# Patient Record
Sex: FEMALE | Race: WHITE | Employment: UNEMPLOYED | URBAN - NONMETROPOLITAN AREA
[De-identification: names, ages, dates, MRNs, and addresses within clinical notes are randomized per-mention and may not be internally consistent; named-entity substitution may affect disease eponyms.]

---

## 2019-10-24 ENCOUNTER — TELEPHONE (OUTPATIENT)
Dept: FAMILY MEDICINE CLINIC | Age: 59
End: 2019-10-24

## 2019-10-24 DIAGNOSIS — D50.9 IRON DEFICIENCY ANEMIA, UNSPECIFIED IRON DEFICIENCY ANEMIA TYPE: ICD-10-CM

## 2019-10-24 DIAGNOSIS — E11.65 TYPE 2 DIABETES MELLITUS WITH HYPERGLYCEMIA, WITHOUT LONG-TERM CURRENT USE OF INSULIN (HCC): ICD-10-CM

## 2019-10-24 DIAGNOSIS — E78.5 HYPERLIPIDEMIA, UNSPECIFIED HYPERLIPIDEMIA TYPE: ICD-10-CM

## 2019-10-24 DIAGNOSIS — I10 ESSENTIAL HYPERTENSION: Primary | ICD-10-CM

## 2019-10-29 LAB
AVERAGE GLUCOSE: 177
AVERAGE GLUCOSE: NORMAL
HBA1C MFR BLD: 7.8 %
HBA1C MFR BLD: NORMAL %

## 2019-11-01 RX ORDER — SENNOSIDES 8.6 MG
TABLET ORAL DAILY
COMMUNITY

## 2019-11-01 RX ORDER — PHENYLEPHRINE HCL 10 MG
TABLET ORAL 2 TIMES DAILY
COMMUNITY

## 2019-11-01 RX ORDER — AMPICILLIN TRIHYDRATE 250 MG
CAPSULE ORAL 2 TIMES DAILY
COMMUNITY
End: 2019-11-04 | Stop reason: ALTCHOICE

## 2019-11-01 RX ORDER — ACETAMINOPHEN 500 MG
500 TABLET ORAL PRN
COMMUNITY
End: 2019-11-04 | Stop reason: ALTCHOICE

## 2019-11-04 ENCOUNTER — OFFICE VISIT (OUTPATIENT)
Dept: FAMILY MEDICINE CLINIC | Age: 59
End: 2019-11-04

## 2019-11-04 VITALS
HEART RATE: 72 BPM | OXYGEN SATURATION: 99 % | BODY MASS INDEX: 25.71 KG/M2 | SYSTOLIC BLOOD PRESSURE: 124 MMHG | TEMPERATURE: 97.7 F | DIASTOLIC BLOOD PRESSURE: 76 MMHG | WEIGHT: 160 LBS | HEIGHT: 66 IN

## 2019-11-04 DIAGNOSIS — E78.5 HYPERLIPIDEMIA, UNSPECIFIED HYPERLIPIDEMIA TYPE: ICD-10-CM

## 2019-11-04 DIAGNOSIS — I10 ESSENTIAL HYPERTENSION: Primary | ICD-10-CM

## 2019-11-04 DIAGNOSIS — E11.65 TYPE 2 DIABETES MELLITUS WITH HYPERGLYCEMIA, WITHOUT LONG-TERM CURRENT USE OF INSULIN (HCC): ICD-10-CM

## 2019-11-04 PROCEDURE — 99213 OFFICE O/P EST LOW 20 MIN: CPT | Performed by: FAMILY MEDICINE

## 2019-11-04 RX ORDER — LISINOPRIL AND HYDROCHLOROTHIAZIDE 20; 12.5 MG/1; MG/1
1 TABLET ORAL 2 TIMES DAILY
COMMUNITY
End: 2019-11-04 | Stop reason: SDUPTHER

## 2019-11-04 RX ORDER — LISINOPRIL AND HYDROCHLOROTHIAZIDE 20; 12.5 MG/1; MG/1
1 TABLET ORAL 2 TIMES DAILY
Qty: 180 TABLET | Refills: 1 | Status: SHIPPED
Start: 2019-11-04 | End: 2020-11-06 | Stop reason: SDUPTHER

## 2019-11-04 RX ORDER — GLYBURIDE 2.5 MG/1
10 TABLET ORAL 2 TIMES DAILY WITH MEALS
Qty: 360 TABLET | Refills: 1 | Status: SHIPPED
Start: 2019-11-04 | End: 2020-02-13 | Stop reason: SDUPTHER

## 2019-11-04 ASSESSMENT — ENCOUNTER SYMPTOMS
EYE PAIN: 0
SORE THROAT: 0
SHORTNESS OF BREATH: 0
COUGH: 0
BACK PAIN: 0
ABDOMINAL PAIN: 0
CONSTIPATION: 0
SINUS PAIN: 0
DIARRHEA: 0

## 2019-11-04 ASSESSMENT — PATIENT HEALTH QUESTIONNAIRE - PHQ9
SUM OF ALL RESPONSES TO PHQ9 QUESTIONS 1 & 2: 0
2. FEELING DOWN, DEPRESSED OR HOPELESS: 0
SUM OF ALL RESPONSES TO PHQ QUESTIONS 1-9: 0
SUM OF ALL RESPONSES TO PHQ QUESTIONS 1-9: 0
1. LITTLE INTEREST OR PLEASURE IN DOING THINGS: 0

## 2019-11-07 DIAGNOSIS — E11.65 TYPE 2 DIABETES MELLITUS WITH HYPERGLYCEMIA, WITHOUT LONG-TERM CURRENT USE OF INSULIN (HCC): ICD-10-CM

## 2019-11-07 DIAGNOSIS — E78.5 HYPERLIPIDEMIA, UNSPECIFIED HYPERLIPIDEMIA TYPE: ICD-10-CM

## 2019-11-07 DIAGNOSIS — D50.9 IRON DEFICIENCY ANEMIA, UNSPECIFIED IRON DEFICIENCY ANEMIA TYPE: ICD-10-CM

## 2019-11-07 DIAGNOSIS — I10 ESSENTIAL HYPERTENSION: ICD-10-CM

## 2020-02-13 RX ORDER — GLYBURIDE 2.5 MG/1
10 TABLET ORAL 2 TIMES DAILY WITH MEALS
Qty: 720 TABLET | Refills: 0 | Status: SHIPPED
Start: 2020-02-13 | End: 2020-02-21 | Stop reason: SDUPTHER

## 2020-02-21 ENCOUNTER — TELEPHONE (OUTPATIENT)
Dept: FAMILY MEDICINE CLINIC | Age: 60
End: 2020-02-21

## 2020-02-21 RX ORDER — GLYBURIDE 5 MG/1
10 TABLET ORAL 2 TIMES DAILY WITH MEALS
Qty: 120 TABLET | Refills: 5 | Status: SHIPPED
Start: 2020-02-21 | End: 2022-01-21 | Stop reason: ALTCHOICE

## 2020-11-06 RX ORDER — LISINOPRIL AND HYDROCHLOROTHIAZIDE 20; 12.5 MG/1; MG/1
1 TABLET ORAL 2 TIMES DAILY
Qty: 180 TABLET | Refills: 0 | Status: SHIPPED
Start: 2020-11-06 | End: 2020-11-13 | Stop reason: SDUPTHER

## 2020-11-06 NOTE — TELEPHONE ENCOUNTER
Name of Medication(s) Requested:  Lisinopril    Pharmacy Requested:   [facility]    Medication(s) pended? [x] Yes  [] No    Last Appointment:  11/4/2019    Future appts:  Future Appointments   Date Time Provider Marcos Cedeno   11/13/2020  3:00 PM DO JIMENEZ [facility]        Does patient need call back? [] Yes  [x] No    She has an appointment soon, but is out.

## 2020-11-13 ENCOUNTER — OFFICE VISIT (OUTPATIENT)
Dept: FAMILY MEDICINE CLINIC | Age: 60
End: 2020-11-13

## 2020-11-13 VITALS
SYSTOLIC BLOOD PRESSURE: 120 MMHG | WEIGHT: 168 LBS | OXYGEN SATURATION: 98 % | BODY MASS INDEX: 27 KG/M2 | HEIGHT: 66 IN | DIASTOLIC BLOOD PRESSURE: 82 MMHG | HEART RATE: 69 BPM | TEMPERATURE: 97.5 F

## 2020-11-13 PROCEDURE — 99214 OFFICE O/P EST MOD 30 MIN: CPT | Performed by: FAMILY MEDICINE

## 2020-11-13 RX ORDER — GLYBURIDE 5 MG/1
10 TABLET ORAL 2 TIMES DAILY WITH MEALS
Qty: 120 TABLET | Refills: 5 | Status: CANCELLED | OUTPATIENT
Start: 2020-11-13

## 2020-11-13 RX ORDER — GLIPIZIDE 10 MG/1
10 TABLET ORAL
Qty: 180 TABLET | Refills: 1 | Status: SHIPPED
Start: 2020-11-13 | End: 2021-11-17 | Stop reason: SDUPTHER

## 2020-11-13 RX ORDER — LISINOPRIL AND HYDROCHLOROTHIAZIDE 20; 12.5 MG/1; MG/1
1 TABLET ORAL 2 TIMES DAILY
Qty: 180 TABLET | Refills: 1 | Status: SHIPPED
Start: 2020-11-13 | End: 2021-11-17 | Stop reason: SDUPTHER

## 2020-11-13 ASSESSMENT — ENCOUNTER SYMPTOMS
ABDOMINAL PAIN: 0
COUGH: 0
WHEEZING: 0
CONSTIPATION: 0
DIARRHEA: 0
BACK PAIN: 0
NAUSEA: 0
SHORTNESS OF BREATH: 0
VOMITING: 0

## 2020-11-13 ASSESSMENT — PATIENT HEALTH QUESTIONNAIRE - PHQ9
1. LITTLE INTEREST OR PLEASURE IN DOING THINGS: 0
2. FEELING DOWN, DEPRESSED OR HOPELESS: 0
SUM OF ALL RESPONSES TO PHQ QUESTIONS 1-9: 0
SUM OF ALL RESPONSES TO PHQ9 QUESTIONS 1 & 2: 0

## 2020-11-13 NOTE — PROGRESS NOTES
20  Rose Hagan : 1960 Sex: female  Age: 56 y.o. Chief Complaint   Patient presents with    Diabetes    Hypertension    Hyperlipidemia     HPI:  56 y.o. female presents today for 1 year follow up of chronic medical conditions, medication refills, and FBW. Patient's chart, medical, surgical and medication history all reviewed. Diabetes Mellitus  56 y.o. female presents for follow up of type 2 diabetes. Current diabetic medications include: oral agent (monotherapy): glyburide (Diabeta). Previous medications tried include: oral agent (monotherapy): metformin (generic), but failed due to diarrhea. Most recent HgA1c was around 7% per patient---7.8% (2019). Her most recent TSH was no checked. LDL is 108. Renal function is normal.  The patient has evidence of end organ damage including cardiovascular disease. She does not see a Podiatrist for foot care . Last eye exam was unknown. Hypertension   The patient presents today for follow up of HTN. The problem is well controlled. Risk factors for coronary artery disease include Age > 27, HTN, diabetes and elevated cholesterol. Current treatments include hydrochlorothiazide (HCTZ), lisinopril (Prinivil) and metoprolol (Lopressor, Toprol). The patient is compliant all of the time. Lifestyle changes the patient has made include none. Today the patient is complaining of none. ROS:  Review of Systems   Constitutional: Negative for chills, fatigue and fever. Respiratory: Negative for cough, shortness of breath and wheezing. Cardiovascular: Negative for chest pain and palpitations. Gastrointestinal: Negative for abdominal pain, constipation, diarrhea, nausea and vomiting. Musculoskeletal: Negative for arthralgias and back pain. Skin: Negative for rash. Neurological: Negative for dizziness and headaches. Psychiatric/Behavioral: Negative for dysphoric mood. The patient is not nervous/anxious.     All other systems reviewed and are negative. Current Outpatient Medications on File Prior to Visit   Medication Sig Dispense Refill    glyBURIDE (DIABETA) 5 MG tablet Take 2 tablets by mouth 2 times daily (with meals) 120 tablet 5    Omega-3 Fatty Acids (FISH OIL DOUBLE STRENGTH) 1200 MG CAPS Take by mouth 2 times daily      Coenzyme Q10 (COQ-10) 400 MG CAPS Take by mouth daily      aspirin 81 MG chewable tablet Take 1 tablet by mouth daily 30 tablet 1    Multiple Vitamins-Minerals (THERAPEUTIC MULTIVITAMIN-MINERALS) tablet Take 1 tablet by mouth daily      Calcium-Magnesium 200-50 MG TABS Take 1 tablet by mouth daily       Cholecalciferol (VITAMIN D3) 5000 UNITS TABS Take 1 tablet by mouth daily        No current facility-administered medications on file prior to visit.         Allergies   Allergen Reactions    Metformin        Past Medical History:   Diagnosis Date    Diabetes mellitus ([facility] [address]     Hyperlipidemia     Hypertension      Past Surgical History:   Procedure Laterality Date     SECTION      3  sections    CORONARY ANGIOPLASTY WITH STENT PLACEMENT  04/10/2017    3.0/38 and 3.0/20 Synergy stents to mid and distal RCA by Dr. Bridgett Navarro       Family History   Problem Relation Age of Onset    Diabetes Maternal Grandfather     Dementia Mother     High Blood Pressure Father      Social History     Socioeconomic History    Marital status:      Spouse name: Not on file    Number of children: Not on file    Years of education: Not on file    Highest education level: Not on file   Occupational History    Not on file   Social Needs    Financial resource strain: Not on file    Food insecurity     Worry: Not on file     Inability: Not on file    Transportation needs     Medical: Not on file     Non-medical: Not on file   Tobacco Use    Smoking status: Never Smoker    Smokeless tobacco: Never Used   Substance and Sexual Activity    Alcohol use: No    Drug use: No    Sexual activity: Not on file   Lifestyle    Physical activity     Days per week: Not on file     Minutes per session: Not on file    Stress: Not on file   Relationships    Social connections     Talks on phone: Not on file     Gets together: Not on file     Attends Adventism service: Not on file     Active member of club or organization: Not on file     Attends meetings of clubs or organizations: Not on file     Relationship status: Not on file    Intimate partner violence     Fear of current or ex partner: Not on file     Emotionally abused: Not on file     Physically abused: Not on file     Forced sexual activity: Not on file   Other Topics Concern    Not on file   Social History Narrative    Not on file       Vitals:    11/13/20 1515   BP: 120/82   Pulse: 69   Temp: 97.5 °F (36.4 °C)   TempSrc: Temporal   SpO2: 98%   Weight: 168 lb (76.2 kg)   Height: 5' 6\" (1.676 m)       Physical Exam:  Physical Exam  Vitals signs and nursing note reviewed. Constitutional:       General: She is not in acute distress. Appearance: Normal appearance. She is well-developed and normal weight. She is not ill-appearing. HENT:      Head: Normocephalic and atraumatic. Right Ear: Hearing and external ear normal.      Left Ear: Hearing and external ear normal.      Nose:      Comments: Wearing mask  Eyes:      General: Lids are normal. No scleral icterus. Extraocular Movements: Extraocular movements intact. Conjunctiva/sclera: Conjunctivae normal.   Neck:      Musculoskeletal: Normal range of motion and neck supple. Thyroid: No thyromegaly. Cardiovascular:      Rate and Rhythm: Normal rate and regular rhythm. Heart sounds: Normal heart sounds. No murmur. Pulmonary:      Effort: Pulmonary effort is normal. No respiratory distress. Breath sounds: Normal breath sounds. No wheezing. Musculoskeletal: Normal range of motion. General: No tenderness or deformity.       Right lower leg: No edema. Left lower leg: No edema. Lymphadenopathy:      Cervical: No cervical adenopathy. Skin:     General: Skin is warm and dry. Findings: No rash. Neurological:      General: No focal deficit present. Mental Status: She is alert and oriented to person, place, and time. Gait: Gait normal.   Psychiatric:         Mood and Affect: Mood and affect normal.         Speech: Speech normal.         Behavior: Behavior normal.         Thought Content: Thought content normal.         Labs:  CBC with Differential:    Lab Results   Component Value Date    WBC 8.2 04/11/2017    RBC 4.93 04/11/2017    HGB 13.5 04/11/2017    HCT 39.9 04/11/2017     04/11/2017    MCV 80.9 04/11/2017    MCH 27.4 04/11/2017    MCHC 33.8 04/11/2017    RDW 13.8 04/11/2017     CMP:    Lab Results   Component Value Date     04/11/2017    K 4.2 04/11/2017    CL 97 04/11/2017    CO2 23 04/11/2017    BUN 10 04/11/2017    CREATININE 0.5 04/11/2017    GFRAA >60 04/11/2017    LABGLOM >60 04/11/2017    GLUCOSE 172 04/11/2017    GLUCOSE 186 11/08/2010    PROT 7.7 11/08/2010    LABALBU 4.8 11/08/2010    CALCIUM 9.6 04/11/2017    BILITOT 0.5 11/08/2010    ALKPHOS 78 11/08/2010    AST 76 11/08/2010    ALT 96 11/08/2010     HgBA1c:    Lab Results   Component Value Date    LABA1C 7.8 10/29/2019     Microalbumen/Creatinine ratio:  No components found for: RUCREAT  FLP:    Lab Results   Component Value Date    TRIG 572 11/08/2010    HDL 30.7 11/08/2010    [address] -  11/08/2010     TSH:  No results found for: TSH     Assessment and Plan:  Gena Crane was seen today for diabetes, hypertension and hyperlipidemia. Diagnoses and all orders for this visit:    Type 2 diabetes mellitus with other circulatory complication, without long-term current use of insulin (HCC)  -     glipiZIDE (GLUCOTROL) 10 MG tablet;  Take 1 tablet by mouth 2 times daily (before meals)  -     CBC Auto Differential; Future  -     Comprehensive Metabolic Panel; Future  -     Hemoglobin A1C; Future  -     Lipid Panel; Future  -     TSH without Reflex; Future  -     Microalbumin / Creatinine Urine Ratio; Future  Overdue for repeat labs at this time. She gets done in outside lab. WIll call with results. Will switch to Glipizide due to cost.     Benign essential hypertension  -     lisinopril-hydroCHLOROthiazide (PRINZIDE;ZESTORETIC) 20-12.5 MG per tablet; Take 1 tablet by mouth 2 times daily  -     metoprolol tartrate (LOPRESSOR) 25 MG tablet; Take 1 tablet by mouth 2 times daily  Well controlled. Continue same medications due to history of stent. Return in about 6 months (around 5/13/2021) for Recheck.       Seen By:  Shruthi Coats DO

## 2021-11-10 ENCOUNTER — OFFICE VISIT (OUTPATIENT)
Dept: FAMILY MEDICINE CLINIC | Age: 61
End: 2021-11-10
Payer: OTHER GOVERNMENT

## 2021-11-10 VITALS
SYSTOLIC BLOOD PRESSURE: 122 MMHG | TEMPERATURE: 96.8 F | DIASTOLIC BLOOD PRESSURE: 84 MMHG | RESPIRATION RATE: 18 BRPM | WEIGHT: 155 LBS | BODY MASS INDEX: 24.91 KG/M2 | HEART RATE: 85 BPM | OXYGEN SATURATION: 98 % | HEIGHT: 66 IN

## 2021-11-10 DIAGNOSIS — U07.1 2019 NOVEL CORONAVIRUS DISEASE (COVID-19): Primary | ICD-10-CM

## 2021-11-10 DIAGNOSIS — R43.2 LOSS OF TASTE: ICD-10-CM

## 2021-11-10 DIAGNOSIS — R09.81 NASAL CONGESTION: ICD-10-CM

## 2021-11-10 LAB
INFLUENZA A ANTIBODY: NORMAL
INFLUENZA B ANTIBODY: NORMAL
Lab: ABNORMAL
PERFORMING INSTRUMENT: ABNORMAL
QC PASS/FAIL: ABNORMAL
SARS-COV-2, POC: DETECTED

## 2021-11-10 PROCEDURE — 99213 OFFICE O/P EST LOW 20 MIN: CPT | Performed by: FAMILY MEDICINE

## 2021-11-10 PROCEDURE — 87804 INFLUENZA ASSAY W/OPTIC: CPT | Performed by: FAMILY MEDICINE

## 2021-11-10 PROCEDURE — 87426 SARSCOV CORONAVIRUS AG IA: CPT | Performed by: FAMILY MEDICINE

## 2021-11-10 RX ORDER — ALBUTEROL SULFATE 2.5 MG/3ML
2.5 SOLUTION RESPIRATORY (INHALATION) EVERY 6 HOURS PRN
Qty: 120 EACH | Refills: 3 | Status: SHIPPED
Start: 2021-11-10 | End: 2022-01-21 | Stop reason: ALTCHOICE

## 2021-11-10 RX ORDER — GUAIFENESIN AND CODEINE PHOSPHATE 100; 10 MG/5ML; MG/5ML
5 SOLUTION ORAL EVERY 4 HOURS PRN
Qty: 237 ML | Refills: 0 | Status: SHIPPED | OUTPATIENT
Start: 2021-11-10 | End: 2021-11-18

## 2021-11-10 RX ORDER — AZITHROMYCIN 250 MG/1
250 TABLET, FILM COATED ORAL SEE ADMIN INSTRUCTIONS
Qty: 6 TABLET | Refills: 0 | Status: SHIPPED | OUTPATIENT
Start: 2021-11-10 | End: 2021-11-15

## 2021-11-10 ASSESSMENT — ENCOUNTER SYMPTOMS
TROUBLE SWALLOWING: 0
EYES NEGATIVE: 1
GASTROINTESTINAL NEGATIVE: 1
SORE THROAT: 1
RESPIRATORY NEGATIVE: 1

## 2021-11-10 NOTE — PROGRESS NOTES
Honey Arrington (:  1960) is a 59 y.o. female,Established patient, here for evaluation of the following chief complaint(s):  Taste Change (x 3 days ), Other (can't smell x 3 days ), Nasal Congestion (x 3 days ), Dizziness (x 3 days ), and Generalized Body Aches (x 3 days )         ASSESSMENT/PLAN:  2019 novel coronavirus disease (COVID-19)  -     XR CHEST (2 VW); Future  -     albuterol (PROVENTIL) (2.5 MG/3ML) 0.083% nebulizer solution; Take 3 mLs by nebulization every 6 hours as needed for Wheezing, Disp-120 each, R-3Normal  -     guaiFENesin-codeine (CHERATUSSIN AC) 100-10 MG/5ML syrup; Take 5 mLs by mouth every 4 hours as needed for Cough for up to 8 days. , Disp-237 mL, R-0Normal  -     azithromycin (ZITHROMAX) 250 MG tablet; Take 1 tablet by mouth See Admin Instructions for 5 days 500mg on day 1 followed by 250mg on days 2 - 5, Disp-6 tablet, R-0Normal  2. Nasal congestion  -     POCT Influenza A/B  -     COVID-19 Ambulatory; Future  -     albuterol (PROVENTIL) (2.5 MG/3ML) 0.083% nebulizer solution; Take 3 mLs by nebulization every 6 hours as needed for Wheezing, Disp-120 each, R-3Normal  -     guaiFENesin-codeine (CHERATUSSIN AC) 100-10 MG/5ML syrup; Take 5 mLs by mouth every 4 hours as needed for Cough for up to 8 days. , Disp-237 mL, R-0Normal  -     azithromycin (ZITHROMAX) 250 MG tablet; Take 1 tablet by mouth See Admin Instructions for 5 days 500mg on day 1 followed by 250mg on days 2 - 5, Disp-6 tablet, R-0Normal  3. Loss of taste  -     POCT COVID-19, Antigen  -     COVID-19 Ambulatory; Future  -     albuterol (PROVENTIL) (2.5 MG/3ML) 0.083% nebulizer solution; Take 3 mLs by nebulization every 6 hours as needed for Wheezing, Disp-120 each, R-3Normal  -     guaiFENesin-codeine (CHERATUSSIN AC) 100-10 MG/5ML syrup; Take 5 mLs by mouth every 4 hours as needed for Cough for up to 8 days. , Disp-237 mL, R-0Normal  -     azithromycin (ZITHROMAX) 250 MG tablet;  Take 1 tablet by mouth See Admin Instructions for 5 days 500mg on day 1 followed by 250mg on days 2 - 5, Disp-6 tablet, R-0Normal  Covid positive. Orders for Covid antibody infusion sent to hospital today. Red flags discussed with patient these occur she is to go directly to the emergency department. We also have her obtain a pulse ox and maintain above 94%. Vitamin regimen also discussed. Patient voiced understanding. No follow-ups on file. Subjective   SUBJECTIVE/OBJECTIVE:  HPI  Patient presents today for 3 to 4 days of worsening body aches, dizziness, congestion, and loss of taste and smell. Patient denies any known sick contact. Denies any fever or chills. Denies any chest pain or shortness of breath other than starting today with shortness of breath. Denies any nausea vomiting or diarrhea. Review of Systems   Constitutional: Positive for fatigue. Negative for fever. HENT: Positive for congestion, postnasal drip and sore throat. Negative for trouble swallowing. Eyes: Negative. Respiratory: Negative. Cardiovascular: Negative. Gastrointestinal: Negative. Musculoskeletal: Positive for myalgias. Negative for neck pain. Skin: Negative for rash. Neurological: Positive for dizziness. Negative for headaches. Hematological: Negative for adenopathy. All other systems reviewed and are negative. Current Outpatient Medications:     albuterol (PROVENTIL) (2.5 MG/3ML) 0.083% nebulizer solution, Take 3 mLs by nebulization every 6 hours as needed for Wheezing, Disp: 120 each, Rfl: 3    guaiFENesin-codeine (CHERATUSSIN AC) 100-10 MG/5ML syrup, Take 5 mLs by mouth every 4 hours as needed for Cough for up to 8 days. , Disp: 237 mL, Rfl: 0    azithromycin (ZITHROMAX) 250 MG tablet, Take 1 tablet by mouth See Admin Instructions for 5 days 500mg on day 1 followed by 250mg on days 2 - 5, Disp: 6 tablet, Rfl: 0    lisinopril-hydroCHLOROthiazide (PRINZIDE;ZESTORETIC) 20-12.5 MG per tablet, Take 1 tablet by Activity    Alcohol use: No    Drug use: No    Sexual activity: Not on file   Other Topics Concern    Not on file   Social History Narrative    Not on file     Social Determinants of Health     Financial Resource Strain:     Difficulty of Paying Living Expenses: Not on file   Food Insecurity:     Worried About Running Out of Food in the Last Year: Not on file    Franky of Food in the Last Year: Not on file   Transportation Needs:     Lack of Transportation (Medical): Not on file    Lack of Transportation (Non-Medical): Not on file   Physical Activity:     Days of Exercise per Week: Not on file    Minutes of Exercise per Session: Not on file   Stress:     Feeling of Stress : Not on file   Social Connections:     Frequency of Communication with Friends and Family: Not on file    Frequency of Social Gatherings with Friends and Family: Not on file    Attends [employer] Services: Not on file    Active Member of [address] [place] [address] [employer] or Organizations: Not on file    Attends Club or Organization Meetings: Not on file    Marital Status: Not on file   Intimate Partner Violence:     Fear of Current or Ex-Partner: Not on file    Emotionally Abused: Not on file    Physically Abused: Not on file    Sexually Abused: Not on file   Housing Stability:     Unable to Pay for Housing in the Last Year: Not on file    Number of [place] in the Last Year: Not on file    Unstable Housing in the Last Year: Not on file     Family History   Problem Relation Age of Onset    Diabetes Maternal Grandfather     Dementia Mother     High Blood Pressure Father       Health Maintenance Due   Topic Date Due    Colon cancer screen colonoscopy  Never done     There are no preventive care reminders to display for this patient.    Diabetes Management   Topic Date Due    Diabetic microalbuminuria test  Never done    A1C test (Diabetic or Prediabetic)  10/29/2020    Lipid screen  10/29/2020      There are no preventive care reminders to display for this patient. Health Maintenance   Topic Date Due    COVID-19 Vaccine (1) Never done    Diabetic microalbuminuria test  Never done    Cervical cancer screen  Never done    Colon cancer screen colonoscopy  Never done    Breast cancer screen  Never done    A1C test (Diabetic or Prediabetic)  10/29/2020    Lipid screen  10/29/2020    Potassium monitoring  10/29/2020    Creatinine monitoring  10/29/2020    Flu vaccine (1) Never done    Diabetic foot exam  11/13/2021 (Originally 1/4/1968)    Diabetic retinal exam  11/13/2021 (Originally 1/4/1968)    DTaP/Tdap/Td vaccine (1 - Tdap) 11/13/2021 (Originally 1/4/1977)    Shingles Vaccine (1 of 2) 11/13/2021 (Originally 1/4/2008)    Pneumococcal 0-64 years Vaccine (1 of 2 - PPSV23) 11/13/2023 (Originally 1/4/1964)    Hepatitis A vaccine  Aged Out    Hib vaccine  Aged Out    Meningococcal (ACWY) vaccine  Aged Out    Hepatitis C screen  Discontinued    HIV screen  Discontinued      There are no preventive care reminders to display for this patient. There are no preventive care reminders to display for this patient. /84   Pulse 85   Temp 96.8 °F (36 °C) (Temporal)   Resp 18   Ht 5' 6\" (1.676 m)   Wt 180 lb (81.6 kg)   SpO2 98%   BMI 29.05 kg/m²     Objective   Physical Exam  Vitals reviewed. Constitutional:       Appearance: She is well-developed. She is ill-appearing. HENT:      Head: Normocephalic and atraumatic. Right Ear: Hearing and tympanic membrane normal.      Left Ear: Hearing and tympanic membrane normal.      Nose: Nose normal.      Mouth/Throat:      Dentition: Normal dentition. Pharynx: No oropharyngeal exudate or posterior oropharyngeal erythema. Tonsils: No tonsillar exudate. Eyes:      Conjunctiva/sclera: Conjunctivae normal.      Pupils: Pupils are equal, round, and reactive to light. Cardiovascular:      Rate and Rhythm: Normal rate and regular rhythm.       Heart sounds: Normal heart sounds. No murmur heard. Pulmonary:      Effort: Pulmonary effort is normal. No respiratory distress. Breath sounds: Decreased air movement present. Decreased breath sounds present. No wheezing. Abdominal:      General: Bowel sounds are normal. There is no distension. Palpations: Abdomen is soft. Musculoskeletal:      Cervical back: Normal range of motion and neck supple. Lymphadenopathy:      Cervical: No cervical adenopathy. Skin:     General: Skin is warm and dry. Findings: No rash. Neurological:      Mental Status: She is alert. Psychiatric:         Behavior: Behavior normal.              Current Outpatient Medications:     albuterol (PROVENTIL) (2.5 MG/3ML) 0.083% nebulizer solution, Take 3 mLs by nebulization every 6 hours as needed for Wheezing, Disp: 120 each, Rfl: 3    guaiFENesin-codeine (CHERATUSSIN AC) 100-10 MG/5ML syrup, Take 5 mLs by mouth every 4 hours as needed for Cough for up to 8 days. , Disp: 237 mL, Rfl: 0    azithromycin (ZITHROMAX) 250 MG tablet, Take 1 tablet by mouth See Admin Instructions for 5 days 500mg on day 1 followed by 250mg on days 2 - 5, Disp: 6 tablet, Rfl: 0    lisinopril-hydroCHLOROthiazide (PRINZIDE;ZESTORETIC) 20-12.5 MG per tablet, Take 1 tablet by mouth 2 times daily, Disp: 180 tablet, Rfl: 1    metoprolol tartrate (LOPRESSOR) 25 MG tablet, Take 1 tablet by mouth 2 times daily, Disp: 180 tablet, Rfl: 1    Omega-3 Fatty Acids (FISH OIL DOUBLE STRENGTH) 1200 MG CAPS, Take by mouth 2 times daily, Disp: , Rfl:     Coenzyme Q10 (COQ-10) 400 MG CAPS, Take by mouth daily, Disp: , Rfl:     aspirin 81 MG chewable tablet, Take 1 tablet by mouth daily, Disp: 30 tablet, Rfl: 1    Calcium-Magnesium 200-50 MG TABS, Take 1 tablet by mouth daily , Disp: , Rfl:     Cholecalciferol (VITAMIN D3) 5000 UNITS TABS, Take 1 tablet by mouth daily , Disp: , Rfl:     glipiZIDE (GLUCOTROL) 10 MG tablet, Take 1 tablet by mouth 2 times daily (before meals), Disp: 180 tablet, Rfl: 1    glyBURIDE (DIABETA) 5 MG tablet, Take 2 tablets by mouth 2 times daily (with meals) (Patient not taking: Reported on 11/10/2021), Disp: 120 tablet, Rfl: 5    Multiple Vitamins-Minerals (THERAPEUTIC MULTIVITAMIN-MINERALS) tablet, Take 1 tablet by mouth daily (Patient not taking: Reported on 11/10/2021), Disp: , Rfl:    Patient Active Problem List   Diagnosis    Benign essential hypertension    Hyperlipidemia    Type 2 diabetes mellitus ([facility]    2019 novel coronavirus disease (COVID-19)     Past Medical History:   Diagnosis Date    Diabetes mellitus ([facility]     Hyperlipidemia     Hypertension      Past Surgical History:   Procedure Laterality Date     SECTION      3  sections    CORONARY ANGIOPLASTY WITH STENT PLACEMENT  04/10/2017    3.0/38 and 3.0/20 Synergy stents to mid and distal RCA by Dr. Matias Lab History     Socioeconomic History    Marital status:      Spouse name: Not on file    Number of children: Not on file    Years of education: Not on file    Highest education level: Not on file   Occupational History    Not on file   Tobacco Use    Smoking status: Never Smoker    Smokeless tobacco: Never Used   Substance and Sexual Activity    Alcohol use: No    Drug use: No    Sexual activity: Not on file   Other Topics Concern    Not on file   Social History Narrative    Not on file     Social Determinants of Health     Financial Resource Strain:     Difficulty of Paying Living Expenses: Not on file   Food Insecurity:     Worried About Running Out of Food in the Last Year: Not on file    Franky of Food in the Last Year: Not on file   Transportation Needs:     Lack of Transportation (Medical): Not on file    Lack of Transportation (Non-Medical):  Not on file   Physical Activity:     Days of Exercise per Week: Not on file    Minutes of Exercise per Session: Not on file   Stress:     Feeling of Stress : Not on file   Social Connections:     Frequency of Communication with Friends and Family: Not on file    Frequency of Social Gatherings with Friends and Family: Not on file    Attends Hinduism Services: Not on file    Active Member of [address] [place] [address] [employer] or Organizations: Not on file    Attends Club or Organization Meetings: Not on file    Marital Status: Not on file   Intimate Partner Violence:     Fear of Current or Ex-Partner: Not on file    Emotionally Abused: Not on file    Physically Abused: Not on file    Sexually Abused: Not on file   Housing Stability:     Unable to Pay for Housing in the Last Year: Not on file    Number of [place] in the Last Year: Not on file    Unstable Housing in the Last Year: Not on file     Family History   Problem Relation Age of Onset    Diabetes Maternal Grandfather     Dementia Mother     High Blood Pressure Father       Health Maintenance Due   Topic Date Due    Colon cancer screen colonoscopy  Never done     There are no preventive care reminders to display for this patient. Diabetes Management   Topic Date Due    Diabetic microalbuminuria test  Never done    A1C test (Diabetic or Prediabetic)  10/29/2020    Lipid screen  10/29/2020      There are no preventive care reminders to display for this patient.    Health Maintenance   Topic Date Due    COVID-19 Vaccine (1) Never done    Diabetic microalbuminuria test  Never done    Cervical cancer screen  Never done    Colon cancer screen colonoscopy  Never done    Breast cancer screen  Never done    A1C test (Diabetic or Prediabetic)  10/29/2020    Lipid screen  10/29/2020    Potassium monitoring  10/29/2020    Creatinine monitoring  10/29/2020    Flu vaccine (1) Never done    Diabetic foot exam  11/13/2021 (Originally 1/4/1968)    Diabetic retinal exam  11/13/2021 (Originally 1/4/1968)    DTaP/Tdap/Td vaccine (1 - Tdap) 11/13/2021 (Originally 1/4/1977)    Shingles Vaccine (1 of 2) 11/13/2021 (Originally 1/4/2008)  Pneumococcal 0-64 years Vaccine (1 of 2 - PPSV23) 11/13/2023 (Originally 1/4/1964)    Hepatitis A vaccine  Aged Out    Hib vaccine  Aged Out    Meningococcal (ACWY) vaccine  Aged Out    Hepatitis C screen  Discontinued    HIV screen  Discontinued      There are no preventive care reminders to display for this patient. There are no preventive care reminders to display for this patient. /84   Pulse 85   Temp 96.8 °F (36 °C) (Temporal)   Resp 18   Ht 5' 6\" (1.676 m)   Wt 180 lb (81.6 kg)   SpO2 98%   BMI 29.05 kg/m²         An electronic signature was used to authenticate this note.     --Alden Santos, DO

## 2021-11-11 DIAGNOSIS — R43.2 LOSS OF TASTE: ICD-10-CM

## 2021-11-11 DIAGNOSIS — R09.81 NASAL CONGESTION: ICD-10-CM

## 2021-11-11 RX ORDER — EPINEPHRINE 1 MG/ML
0.3 INJECTION, SOLUTION, CONCENTRATE INTRAVENOUS PRN
OUTPATIENT
Start: 2021-11-11

## 2021-11-11 RX ORDER — METHYLPREDNISOLONE SODIUM SUCCINATE 125 MG/2ML
125 INJECTION, POWDER, LYOPHILIZED, FOR SOLUTION INTRAMUSCULAR; INTRAVENOUS ONCE
OUTPATIENT
Start: 2021-11-11 | End: 2021-11-11

## 2021-11-11 RX ORDER — SODIUM CHLORIDE 0.9 % (FLUSH) 0.9 %
5-40 SYRINGE (ML) INJECTION PRN
OUTPATIENT
Start: 2021-11-11

## 2021-11-11 RX ORDER — DIPHENHYDRAMINE HYDROCHLORIDE 50 MG/ML
50 INJECTION INTRAMUSCULAR; INTRAVENOUS ONCE
OUTPATIENT
Start: 2021-11-11 | End: 2021-11-11

## 2021-11-11 RX ORDER — SODIUM CHLORIDE 9 MG/ML
INJECTION, SOLUTION INTRAVENOUS CONTINUOUS
OUTPATIENT
Start: 2021-11-11

## 2021-11-12 LAB
SARS-COV-2: DETECTED
SOURCE: ABNORMAL

## 2021-11-17 DIAGNOSIS — I10 BENIGN ESSENTIAL HYPERTENSION: ICD-10-CM

## 2021-11-17 DIAGNOSIS — E11.59 TYPE 2 DIABETES MELLITUS WITH OTHER CIRCULATORY COMPLICATION, WITHOUT LONG-TERM CURRENT USE OF INSULIN (HCC): ICD-10-CM

## 2021-11-17 RX ORDER — GLIPIZIDE 10 MG/1
10 TABLET ORAL
Qty: 180 TABLET | Refills: 0 | Status: CANCELLED | OUTPATIENT
Start: 2021-11-17 | End: 2022-02-15

## 2021-11-17 RX ORDER — LISINOPRIL AND HYDROCHLOROTHIAZIDE 20; 12.5 MG/1; MG/1
1 TABLET ORAL 2 TIMES DAILY
Qty: 180 TABLET | Refills: 0 | Status: SHIPPED
Start: 2021-11-17 | End: 2022-01-21 | Stop reason: SDUPTHER

## 2021-11-17 RX ORDER — GLIPIZIDE 10 MG/1
10 TABLET ORAL
Qty: 180 TABLET | Refills: 0 | Status: SHIPPED
Start: 2021-11-17 | End: 2022-01-21 | Stop reason: SDUPTHER

## 2021-11-17 RX ORDER — LISINOPRIL AND HYDROCHLOROTHIAZIDE 20; 12.5 MG/1; MG/1
1 TABLET ORAL 2 TIMES DAILY
Qty: 180 TABLET | Refills: 1 | Status: CANCELLED | OUTPATIENT
Start: 2021-11-17

## 2021-11-17 NOTE — TELEPHONE ENCOUNTER
Bryce Nelson calling for some refills she will before her appointment on Jan 21. I have [facility].       Glipized      Metoprolol      Lisinopril

## 2022-01-21 ENCOUNTER — OFFICE VISIT (OUTPATIENT)
Dept: FAMILY MEDICINE CLINIC | Age: 62
End: 2022-01-21
Payer: OTHER GOVERNMENT

## 2022-01-21 VITALS
HEIGHT: 66 IN | SYSTOLIC BLOOD PRESSURE: 132 MMHG | WEIGHT: 156 LBS | DIASTOLIC BLOOD PRESSURE: 62 MMHG | HEART RATE: 72 BPM | TEMPERATURE: 97.9 F | BODY MASS INDEX: 25.07 KG/M2 | OXYGEN SATURATION: 98 %

## 2022-01-21 DIAGNOSIS — I25.10 CORONARY ARTERY DISEASE INVOLVING NATIVE CORONARY ARTERY OF NATIVE HEART WITHOUT ANGINA PECTORIS: ICD-10-CM

## 2022-01-21 DIAGNOSIS — I10 BENIGN ESSENTIAL HYPERTENSION: ICD-10-CM

## 2022-01-21 DIAGNOSIS — E78.2 MIXED HYPERLIPIDEMIA: ICD-10-CM

## 2022-01-21 DIAGNOSIS — E11.59 TYPE 2 DIABETES MELLITUS WITH OTHER CIRCULATORY COMPLICATION, WITHOUT LONG-TERM CURRENT USE OF INSULIN (HCC): Primary | ICD-10-CM

## 2022-01-21 PROCEDURE — 99214 OFFICE O/P EST MOD 30 MIN: CPT | Performed by: FAMILY MEDICINE

## 2022-01-21 RX ORDER — LISINOPRIL AND HYDROCHLOROTHIAZIDE 20; 12.5 MG/1; MG/1
1 TABLET ORAL 2 TIMES DAILY
Qty: 180 TABLET | Refills: 3 | Status: SHIPPED | OUTPATIENT
Start: 2022-01-21

## 2022-01-21 RX ORDER — NITROGLYCERIN 0.4 MG/1
0.4 TABLET SUBLINGUAL EVERY 5 MIN PRN
Qty: 25 TABLET | Refills: 1 | Status: SHIPPED | OUTPATIENT
Start: 2022-01-21

## 2022-01-21 RX ORDER — NITROGLYCERIN 0.4 MG/1
0.4 TABLET SUBLINGUAL EVERY 5 MIN PRN
COMMUNITY
End: 2022-01-21 | Stop reason: SDUPTHER

## 2022-01-21 RX ORDER — GLIPIZIDE 10 MG/1
10 TABLET ORAL
Qty: 180 TABLET | Refills: 3 | Status: SHIPPED | OUTPATIENT
Start: 2022-01-21 | End: 2022-04-21

## 2022-01-21 ASSESSMENT — PATIENT HEALTH QUESTIONNAIRE - PHQ9
2. FEELING DOWN, DEPRESSED OR HOPELESS: 0
SUM OF ALL RESPONSES TO PHQ9 QUESTIONS 1 & 2: 0
SUM OF ALL RESPONSES TO PHQ QUESTIONS 1-9: 0
1. LITTLE INTEREST OR PLEASURE IN DOING THINGS: 0

## 2022-01-21 ASSESSMENT — ENCOUNTER SYMPTOMS
CONSTIPATION: 0
SHORTNESS OF BREATH: 0
NAUSEA: 0
ABDOMINAL PAIN: 0
DIARRHEA: 0
COUGH: 0
BACK PAIN: 0
WHEEZING: 0
VOMITING: 0

## 2022-01-21 NOTE — PROGRESS NOTES
reviewed and are negative. Current Outpatient Medications on File Prior to Visit   Medication Sig Dispense Refill    Omega-3 Fatty Acids (FISH OIL DOUBLE STRENGTH) 1200 MG CAPS Take by mouth 2 times daily      Coenzyme Q10 (COQ-10) 400 MG CAPS Take by mouth daily      aspirin 81 MG chewable tablet Take 1 tablet by mouth daily 30 tablet 1    Calcium-Magnesium 200-50 MG TABS Take 1 tablet by mouth daily       Cholecalciferol (VITAMIN D3) 5000 UNITS TABS Take 1 tablet by mouth daily        No current facility-administered medications on file prior to visit.        Allergies   Allergen Reactions    Metformin        Past Medical History:   Diagnosis Date    2019 novel coronavirus disease (COVID-19) 11/10/2021    Diabetes mellitus ([facility] [address]     Hyperlipidemia     Hypertension      Past Surgical History:   Procedure Laterality Date     SECTION      3  sections    CORONARY ANGIOPLASTY WITH STENT PLACEMENT  04/10/2017    3.0/38 and 3.0/20 Synergy stents to mid and distal RCA by Dr. Kolton Marrero History   Problem Relation Age of Onset    Diabetes Maternal Grandfather     Dementia Mother     High Blood Pressure Father      Social History     Socioeconomic History    Marital status:      Spouse name: Not on file    Number of children: Not on file    Years of education: Not on file    Highest education level: Not on file   Occupational History    Not on file   Tobacco Use    Smoking status: Never Smoker    Smokeless tobacco: Never Used   Substance and Sexual Activity    Alcohol use: No    Drug use: No    Sexual activity: Not on file   Other Topics Concern    Not on file   Social History Narrative    Not on file     Social Determinants of Health     Financial Resource Strain:     Difficulty of Paying Living Expenses: Not on file   Food Insecurity:     Worried About [address] in the Last Year: Not on file    [address] in the Last Year: Not on file   Transportation Needs:     Lack of Transportation (Medical): Not on file    Lack of Transportation (Non-Medical): Not on file   Physical Activity:     Days of Exercise per Week: Not on file    Minutes of Exercise per Session: Not on file   Stress:     Feeling of Stress : Not on file   Social Connections:     Frequency of Communication with Friends and Family: Not on file    Frequency of Social Gatherings with Friends and Family: Not on file    Attends [employer] Services: Not on file    Active Member of [address] [place] [address] or Organizations: Not on file    Attends Club or Organization Meetings: Not on file    Marital Status: Not on file   Intimate Partner Violence:     Fear of Current or Ex-Partner: Not on file    Emotionally Abused: Not on file    Physically Abused: Not on file    Sexually Abused: Not on file   Housing Stability:     Unable to Pay for Housing in the Last Year: Not on file    Number of [place] in the Last Year: Not on file    Unstable Housing in the Last Year: Not on file       Vitals:    01/21/22 1509   BP: 132/62   Pulse: 72   Temp: 97.9 °F (36.6 °C)   SpO2: 98%   Weight: 156 lb (70.8 kg)   Height: 5' 6\" (1.676 m)       Physical Exam:  Physical Exam  Vitals and nursing note reviewed. Constitutional:       General: She is not in acute distress. Appearance: Normal appearance. She is well-developed and normal weight. She is not ill-appearing. HENT:      Head: Normocephalic and atraumatic. Right Ear: Hearing and external ear normal.      Left Ear: Hearing and external ear normal.      Nose:      Comments: Wearing mask  Eyes:      General: Lids are normal. No scleral icterus. Extraocular Movements: Extraocular movements intact. Conjunctiva/sclera: Conjunctivae normal.   Neck:      Thyroid: No thyromegaly. Vascular: No carotid bruit. Cardiovascular:      Rate and Rhythm: Normal rate and regular rhythm. Heart sounds: Normal heart sounds.  No murmur heard.      Pulmonary:      Effort: Pulmonary effort is normal. No respiratory distress. Breath sounds: Normal breath sounds. No wheezing. Musculoskeletal:         General: No tenderness or deformity. Normal range of motion. Cervical back: Normal range of motion and neck supple. Right lower leg: No edema. Left lower leg: No edema. Lymphadenopathy:      Cervical: No cervical adenopathy. Skin:     General: Skin is warm and dry. Findings: No rash. Neurological:      General: No focal deficit present. Mental Status: She is alert and oriented to person, place, and time. Gait: Gait normal.   Psychiatric:         Mood and Affect: Mood and affect normal.         Speech: Speech normal.         Behavior: Behavior normal.         Thought Content: Thought content normal.         Labs:  CBC with Differential:    Lab Results   Component Value Date    WBC 8.2 04/11/2017    RBC 4.93 04/11/2017    HGB 13.5 04/11/2017    HCT 39.9 04/11/2017     04/11/2017    MCV 80.9 04/11/2017    MCH 27.4 04/11/2017    MCHC 33.8 04/11/2017    RDW 13.8 04/11/2017     CMP:    Lab Results   Component Value Date     04/11/2017    K 4.2 04/11/2017    CL 97 04/11/2017    CO2 23 04/11/2017    BUN 10 04/11/2017    CREATININE 0.5 04/11/2017    GFRAA >60 04/11/2017    LABGLOM >60 04/11/2017    GLUCOSE 172 04/11/2017    GLUCOSE 186 11/08/2010    PROT 7.7 11/08/2010    LABALBU 4.8 11/08/2010    CALCIUM 9.6 04/11/2017    BILITOT 0.5 11/08/2010    ALKPHOS 78 11/08/2010    AST 76 11/08/2010    ALT 96 11/08/2010     HgBA1c:    Lab Results   Component Value Date    LABA1C 7.8 10/29/2019     Microalbumen/Creatinine ratio:  No components found for: RUCREAT  FLP:    Lab Results   Component Value Date    TRIG 572 11/08/2010    HDL 30.7 11/08/2010    [address] -  11/08/2010     TSH:  No results found for: TSH       Assessment and Plan:  Chhaya Kevin was seen today for diabetes.     Diagnoses and all orders for this visit:    Type 2 diabetes mellitus with other circulatory complication, without long-term current use of insulin (HCC)  -     glipiZIDE (GLUCOTROL) 10 MG tablet; Take 1 tablet by mouth 2 times daily (before meals)  -     CBC Auto Differential; Future  -     Comprehensive Metabolic Panel; Future  -     Hemoglobin A1C; Future  -     Lipid Panel; Future  -     TSH without Reflex; Future  -     Microalbumin / Creatinine Urine Ratio; Future  -     Urinalysis; Future  Poorly controlled. Patient noting FBS of 180-200s. Was able to control with diet in the past.  Unable to afford any other medications without insurance. Will check labs from outside lab and call with results. Benign essential hypertension  -     lisinopril-hydroCHLOROthiazide (PRINZIDE;ZESTORETIC) 20-12.5 MG per tablet; Take 1 tablet by mouth 2 times daily  -     metoprolol tartrate (LOPRESSOR) 25 MG tablet; Take 1 tablet by mouth 2 times daily  -     CBC Auto Differential; Future  -     Comprehensive Metabolic Panel; Future  -     Hemoglobin A1C; Future  -     Lipid Panel; Future  -     TSH without Reflex; Future  -     Microalbumin / Creatinine Urine Ratio; Future  -     Urinalysis; Future  Well controlled    Mixed hyperlipidemia  -     Lipid Panel; Future  Has not been checked since 2019. Coronary artery disease involving native coronary artery of native heart without angina pectoris  -     nitroGLYCERIN (NITROSTAT) 0.4 MG SL tablet; Place 1 tablet under the tongue every 5 minutes as needed for Chest pain up to max of 3 total doses. If no relief after 1 dose, call 911. Return in about 1 year (around 1/21/2023), or if symptoms worsen or fail to improve, for AWV.       Seen By:  Zaida López,

## 2022-01-23 PROBLEM — I25.10 CORONARY ARTERY DISEASE INVOLVING NATIVE CORONARY ARTERY OF NATIVE HEART WITHOUT ANGINA PECTORIS: Status: ACTIVE | Noted: 2022-01-23

## 2022-12-06 ENCOUNTER — APPOINTMENT (OUTPATIENT)
Dept: GENERAL RADIOLOGY | Age: 62
DRG: 281 | End: 2022-12-06

## 2022-12-06 ENCOUNTER — HOSPITAL ENCOUNTER (INPATIENT)
Age: 62
LOS: 1 days | Discharge: ANOTHER HOSPITAL | DRG: 281 | End: 2022-12-07
Attending: EMERGENCY MEDICINE | Admitting: STUDENT IN AN ORGANIZED HEALTH CARE EDUCATION/TRAINING PROGRAM

## 2022-12-06 DIAGNOSIS — I21.4 NSTEMI (NON-ST ELEVATED MYOCARDIAL INFARCTION) (HCC): Primary | ICD-10-CM

## 2022-12-06 PROBLEM — I45.10 RBBB (RIGHT BUNDLE BRANCH BLOCK): Status: ACTIVE | Noted: 2022-12-06

## 2022-12-06 PROBLEM — Z95.5 HISTORY OF RIGHT CORONARY ARTERY STENT PLACEMENT: Status: ACTIVE | Noted: 2022-12-06

## 2022-12-06 LAB
ALBUMIN SERPL-MCNC: 3.9 G/DL (ref 3.5–5.2)
ALP BLD-CCNC: 81 U/L (ref 35–104)
ALT SERPL-CCNC: 7 U/L (ref 0–32)
ANION GAP SERPL CALCULATED.3IONS-SCNC: 12 MMOL/L (ref 7–16)
APTT: 27.3 SEC (ref 24.5–35.1)
APTT: 34.2 SEC (ref 24.5–35.1)
AST SERPL-CCNC: 17 U/L (ref 0–31)
BASOPHILS ABSOLUTE: 0.06 E9/L (ref 0–0.2)
BASOPHILS RELATIVE PERCENT: 1.1 % (ref 0–2)
BILIRUB SERPL-MCNC: 0.3 MG/DL (ref 0–1.2)
BUN BLDV-MCNC: 13 MG/DL (ref 6–23)
CALCIUM SERPL-MCNC: 9.8 MG/DL (ref 8.6–10.2)
CHLORIDE BLD-SCNC: 93 MMOL/L (ref 98–107)
CHOLESTEROL, TOTAL: 449 MG/DL (ref 0–199)
CO2: 23 MMOL/L (ref 22–29)
CREAT SERPL-MCNC: 0.5 MG/DL (ref 0.5–1)
EKG ATRIAL RATE: 76 BPM
EKG ATRIAL RATE: 82 BPM
EKG P AXIS: 35 DEGREES
EKG P AXIS: 60 DEGREES
EKG P-R INTERVAL: 168 MS
EKG P-R INTERVAL: 174 MS
EKG Q-T INTERVAL: 384 MS
EKG Q-T INTERVAL: 392 MS
EKG QRS DURATION: 122 MS
EKG QRS DURATION: 128 MS
EKG QTC CALCULATION (BAZETT): 432 MS
EKG QTC CALCULATION (BAZETT): 457 MS
EKG R AXIS: -68 DEGREES
EKG R AXIS: -70 DEGREES
EKG T AXIS: -11 DEGREES
EKG T AXIS: -36 DEGREES
EKG VENTRICULAR RATE: 76 BPM
EKG VENTRICULAR RATE: 82 BPM
EOSINOPHILS ABSOLUTE: 0.13 E9/L (ref 0.05–0.5)
EOSINOPHILS RELATIVE PERCENT: 2.3 % (ref 0–6)
GFR SERPL CREATININE-BSD FRML MDRD: >60 ML/MIN/1.73
GLUCOSE BLD-MCNC: 345 MG/DL (ref 74–99)
HBA1C MFR BLD: 12.3 % (ref 4–5.6)
HCT VFR BLD CALC: 39.2 % (ref 34–48)
HDLC SERPL-MCNC: 21 MG/DL
HEMOGLOBIN: 13.1 G/DL (ref 11.5–15.5)
IMMATURE GRANULOCYTES #: 0.05 E9/L
IMMATURE GRANULOCYTES %: 0.9 % (ref 0–5)
INR BLD: 1
LDL CHOLESTEROL CALCULATED: ABNORMAL MG/DL (ref 0–99)
LYMPHOCYTES ABSOLUTE: 1.7 E9/L (ref 1.5–4)
LYMPHOCYTES RELATIVE PERCENT: 30.4 % (ref 20–42)
MCH RBC QN AUTO: 27.1 PG (ref 26–35)
MCHC RBC AUTO-ENTMCNC: 33.4 % (ref 32–34.5)
MCV RBC AUTO: 81 FL (ref 80–99.9)
METER GLUCOSE: 274 MG/DL (ref 74–99)
METER GLUCOSE: 363 MG/DL (ref 74–99)
MONOCYTES ABSOLUTE: 0.34 E9/L (ref 0.1–0.95)
MONOCYTES RELATIVE PERCENT: 6.1 % (ref 2–12)
NEUTROPHILS ABSOLUTE: 3.31 E9/L (ref 1.8–7.3)
NEUTROPHILS RELATIVE PERCENT: 59.2 % (ref 43–80)
PDW BLD-RTO: 13.4 FL (ref 11.5–15)
PLATELET # BLD: 250 E9/L (ref 130–450)
PMV BLD AUTO: 10.5 FL (ref 7–12)
POTASSIUM SERPL-SCNC: 4.3 MMOL/L (ref 3.5–5)
PRO-BNP: 85 PG/ML (ref 0–125)
PROTHROMBIN TIME: 11 SEC (ref 9.3–12.4)
RBC # BLD: 4.84 E12/L (ref 3.5–5.5)
SODIUM BLD-SCNC: 128 MMOL/L (ref 132–146)
TOTAL PROTEIN: 6.4 G/DL (ref 6.4–8.3)
TRIGL SERPL-MCNC: 2539 MG/DL (ref 0–149)
TROPONIN, HIGH SENSITIVITY: 67 NG/L (ref 0–9)
TROPONIN, HIGH SENSITIVITY: 82 NG/L (ref 0–9)
VLDLC SERPL CALC-MCNC: ABNORMAL MG/DL
WBC # BLD: 5.6 E9/L (ref 4.5–11.5)

## 2022-12-06 PROCEDURE — 6370000000 HC RX 637 (ALT 250 FOR IP): Performed by: EMERGENCY MEDICINE

## 2022-12-06 PROCEDURE — 93010 ELECTROCARDIOGRAM REPORT: CPT | Performed by: INTERNAL MEDICINE

## 2022-12-06 PROCEDURE — 83036 HEMOGLOBIN GLYCOSYLATED A1C: CPT

## 2022-12-06 PROCEDURE — 93005 ELECTROCARDIOGRAM TRACING: CPT

## 2022-12-06 PROCEDURE — 83880 ASSAY OF NATRIURETIC PEPTIDE: CPT

## 2022-12-06 PROCEDURE — 71045 X-RAY EXAM CHEST 1 VIEW: CPT

## 2022-12-06 PROCEDURE — 2060000000 HC ICU INTERMEDIATE R&B

## 2022-12-06 PROCEDURE — 2580000003 HC RX 258: Performed by: STUDENT IN AN ORGANIZED HEALTH CARE EDUCATION/TRAINING PROGRAM

## 2022-12-06 PROCEDURE — 36415 COLL VENOUS BLD VENIPUNCTURE: CPT

## 2022-12-06 PROCEDURE — 82962 GLUCOSE BLOOD TEST: CPT

## 2022-12-06 PROCEDURE — 93005 ELECTROCARDIOGRAM TRACING: CPT | Performed by: EMERGENCY MEDICINE

## 2022-12-06 PROCEDURE — 85025 COMPLETE CBC W/AUTO DIFF WBC: CPT

## 2022-12-06 PROCEDURE — 6360000002 HC RX W HCPCS

## 2022-12-06 PROCEDURE — 99285 EMERGENCY DEPT VISIT HI MDM: CPT

## 2022-12-06 PROCEDURE — 85610 PROTHROMBIN TIME: CPT

## 2022-12-06 PROCEDURE — 6370000000 HC RX 637 (ALT 250 FOR IP)

## 2022-12-06 PROCEDURE — 80061 LIPID PANEL: CPT

## 2022-12-06 PROCEDURE — 85730 THROMBOPLASTIN TIME PARTIAL: CPT

## 2022-12-06 PROCEDURE — 96374 THER/PROPH/DIAG INJ IV PUSH: CPT

## 2022-12-06 PROCEDURE — 84484 ASSAY OF TROPONIN QUANT: CPT

## 2022-12-06 PROCEDURE — 6370000000 HC RX 637 (ALT 250 FOR IP): Performed by: INTERNAL MEDICINE

## 2022-12-06 PROCEDURE — 80053 COMPREHEN METABOLIC PANEL: CPT

## 2022-12-06 RX ORDER — ASPIRIN 81 MG/1
243 TABLET, CHEWABLE ORAL ONCE
Status: COMPLETED | OUTPATIENT
Start: 2022-12-06 | End: 2022-12-06

## 2022-12-06 RX ORDER — ACETAMINOPHEN 650 MG/1
650 SUPPOSITORY RECTAL EVERY 6 HOURS PRN
Status: DISCONTINUED | OUTPATIENT
Start: 2022-12-06 | End: 2022-12-07 | Stop reason: HOSPADM

## 2022-12-06 RX ORDER — ASPIRIN 81 MG/1
81 TABLET, CHEWABLE ORAL 2 TIMES DAILY
Status: ON HOLD | COMMUNITY

## 2022-12-06 RX ORDER — GLIPIZIDE 10 MG/1
5 TABLET ORAL NIGHTLY
Status: ON HOLD | COMMUNITY

## 2022-12-06 RX ORDER — INSULIN LISPRO 100 [IU]/ML
0-4 INJECTION, SOLUTION INTRAVENOUS; SUBCUTANEOUS NIGHTLY
Status: DISCONTINUED | OUTPATIENT
Start: 2022-12-06 | End: 2022-12-07 | Stop reason: HOSPADM

## 2022-12-06 RX ORDER — LISINOPRIL AND HYDROCHLOROTHIAZIDE 20; 12.5 MG/1; MG/1
1 TABLET ORAL EVERY MORNING
Status: ON HOLD | COMMUNITY

## 2022-12-06 RX ORDER — SODIUM CHLORIDE 9 MG/ML
INJECTION, SOLUTION INTRAVENOUS PRN
Status: DISCONTINUED | OUTPATIENT
Start: 2022-12-06 | End: 2022-12-07 | Stop reason: HOSPADM

## 2022-12-06 RX ORDER — ASPIRIN 81 MG/1
81 TABLET ORAL DAILY
Status: DISCONTINUED | OUTPATIENT
Start: 2022-12-07 | End: 2022-12-07 | Stop reason: HOSPADM

## 2022-12-06 RX ORDER — HEPARIN SODIUM 1000 [USP'U]/ML
4000 INJECTION, SOLUTION INTRAVENOUS; SUBCUTANEOUS PRN
Status: DISCONTINUED | OUTPATIENT
Start: 2022-12-06 | End: 2022-12-07 | Stop reason: HOSPADM

## 2022-12-06 RX ORDER — INSULIN LISPRO 100 [IU]/ML
0-8 INJECTION, SOLUTION INTRAVENOUS; SUBCUTANEOUS
Status: DISCONTINUED | OUTPATIENT
Start: 2022-12-06 | End: 2022-12-07 | Stop reason: HOSPADM

## 2022-12-06 RX ORDER — NITROGLYCERIN 0.4 MG/1
0.4 TABLET SUBLINGUAL ONCE
Status: COMPLETED | OUTPATIENT
Start: 2022-12-06 | End: 2022-12-06

## 2022-12-06 RX ORDER — ATORVASTATIN CALCIUM 40 MG/1
80 TABLET, FILM COATED ORAL ONCE
Status: COMPLETED | OUTPATIENT
Start: 2022-12-06 | End: 2022-12-06

## 2022-12-06 RX ORDER — FENTANYL CITRATE 50 UG/ML
25 INJECTION, SOLUTION INTRAMUSCULAR; INTRAVENOUS ONCE
Status: COMPLETED | OUTPATIENT
Start: 2022-12-06 | End: 2022-12-06

## 2022-12-06 RX ORDER — DEXTROSE MONOHYDRATE 100 MG/ML
INJECTION, SOLUTION INTRAVENOUS CONTINUOUS PRN
Status: DISCONTINUED | OUTPATIENT
Start: 2022-12-06 | End: 2022-12-07 | Stop reason: HOSPADM

## 2022-12-06 RX ORDER — GLIPIZIDE 10 MG/1
10 TABLET ORAL EVERY MORNING
Status: ON HOLD | COMMUNITY

## 2022-12-06 RX ORDER — HEPARIN SODIUM 1000 [USP'U]/ML
4000 INJECTION, SOLUTION INTRAVENOUS; SUBCUTANEOUS ONCE
Status: COMPLETED | OUTPATIENT
Start: 2022-12-06 | End: 2022-12-06

## 2022-12-06 RX ORDER — CHLORAL HYDRATE 500 MG
1000 CAPSULE ORAL 2 TIMES DAILY
Status: ON HOLD | COMMUNITY

## 2022-12-06 RX ORDER — SODIUM CHLORIDE 0.9 % (FLUSH) 0.9 %
10 SYRINGE (ML) INJECTION EVERY 12 HOURS SCHEDULED
Status: DISCONTINUED | OUTPATIENT
Start: 2022-12-06 | End: 2022-12-07 | Stop reason: HOSPADM

## 2022-12-06 RX ORDER — ONDANSETRON 2 MG/ML
4 INJECTION INTRAMUSCULAR; INTRAVENOUS EVERY 6 HOURS PRN
Status: DISCONTINUED | OUTPATIENT
Start: 2022-12-06 | End: 2022-12-07 | Stop reason: HOSPADM

## 2022-12-06 RX ORDER — POTASSIUM CHLORIDE 7.45 MG/ML
10 INJECTION INTRAVENOUS PRN
Status: DISCONTINUED | OUTPATIENT
Start: 2022-12-06 | End: 2022-12-07 | Stop reason: HOSPADM

## 2022-12-06 RX ORDER — ACETAMINOPHEN 325 MG/1
650 TABLET ORAL EVERY 6 HOURS PRN
Status: DISCONTINUED | OUTPATIENT
Start: 2022-12-06 | End: 2022-12-07 | Stop reason: HOSPADM

## 2022-12-06 RX ORDER — LISINOPRIL AND HYDROCHLOROTHIAZIDE 20; 12.5 MG/1; MG/1
1 TABLET ORAL NIGHTLY
Status: ON HOLD | COMMUNITY

## 2022-12-06 RX ORDER — SENNA PLUS 8.6 MG/1
1 TABLET ORAL DAILY PRN
Status: DISCONTINUED | OUTPATIENT
Start: 2022-12-06 | End: 2022-12-07 | Stop reason: HOSPADM

## 2022-12-06 RX ORDER — MAGNESIUM OXIDE 400 MG/1
400 TABLET ORAL NIGHTLY
Status: ON HOLD | COMMUNITY

## 2022-12-06 RX ORDER — SODIUM CHLORIDE 0.9 % (FLUSH) 0.9 %
10 SYRINGE (ML) INJECTION PRN
Status: DISCONTINUED | OUTPATIENT
Start: 2022-12-06 | End: 2022-12-07 | Stop reason: HOSPADM

## 2022-12-06 RX ORDER — PHENOL 1.4 %
600 AEROSOL, SPRAY (ML) MUCOUS MEMBRANE NIGHTLY
Status: ON HOLD | COMMUNITY

## 2022-12-06 RX ORDER — ONDANSETRON 4 MG/1
4 TABLET, ORALLY DISINTEGRATING ORAL EVERY 8 HOURS PRN
Status: DISCONTINUED | OUTPATIENT
Start: 2022-12-06 | End: 2022-12-07 | Stop reason: HOSPADM

## 2022-12-06 RX ORDER — HEPARIN SODIUM 1000 [USP'U]/ML
2000 INJECTION, SOLUTION INTRAVENOUS; SUBCUTANEOUS PRN
Status: DISCONTINUED | OUTPATIENT
Start: 2022-12-06 | End: 2022-12-07 | Stop reason: HOSPADM

## 2022-12-06 RX ORDER — HEPARIN SODIUM 10000 [USP'U]/100ML
5-30 INJECTION, SOLUTION INTRAVENOUS CONTINUOUS
Status: DISCONTINUED | OUTPATIENT
Start: 2022-12-06 | End: 2022-12-07 | Stop reason: HOSPADM

## 2022-12-06 RX ORDER — POTASSIUM CHLORIDE 20 MEQ/1
40 TABLET, EXTENDED RELEASE ORAL PRN
Status: DISCONTINUED | OUTPATIENT
Start: 2022-12-06 | End: 2022-12-07 | Stop reason: HOSPADM

## 2022-12-06 RX ADMIN — HEPARIN SODIUM 4000 UNITS: 1000 INJECTION INTRAVENOUS; SUBCUTANEOUS at 20:05

## 2022-12-06 RX ADMIN — ASPIRIN 243 MG: 81 TABLET, CHEWABLE ORAL at 08:10

## 2022-12-06 RX ADMIN — FENTANYL CITRATE 25 MCG: 50 INJECTION INTRAMUSCULAR; INTRAVENOUS at 08:56

## 2022-12-06 RX ADMIN — NITROGLYCERIN 0.4 MG: 0.4 TABLET SUBLINGUAL at 08:10

## 2022-12-06 RX ADMIN — NITROGLYCERIN 0.4 MG: 0.4 TABLET SUBLINGUAL at 08:52

## 2022-12-06 RX ADMIN — METOPROLOL TARTRATE 25 MG: 25 TABLET, FILM COATED ORAL at 19:59

## 2022-12-06 RX ADMIN — HEPARIN SODIUM 4000 UNITS: 1000 INJECTION INTRAVENOUS; SUBCUTANEOUS at 13:51

## 2022-12-06 RX ADMIN — Medication 10 ML: at 20:00

## 2022-12-06 RX ADMIN — NITROGLYCERIN 0.5 INCH: 20 OINTMENT TOPICAL at 14:02

## 2022-12-06 RX ADMIN — HEPARIN SODIUM 12 UNITS/KG/HR: 10000 INJECTION, SOLUTION INTRAVENOUS at 13:51

## 2022-12-06 RX ADMIN — NITROGLYCERIN 0.5 INCH: 20 OINTMENT TOPICAL at 19:59

## 2022-12-06 RX ADMIN — ATORVASTATIN CALCIUM 80 MG: 40 TABLET, FILM COATED ORAL at 14:03

## 2022-12-06 ASSESSMENT — ENCOUNTER SYMPTOMS
SORE THROAT: 0
EYE REDNESS: 0
WHEEZING: 0
ABDOMINAL DISTENTION: 0
DIARRHEA: 0
SHORTNESS OF BREATH: 0
COUGH: 0
ABDOMINAL PAIN: 0
EYE DISCHARGE: 0
CHEST TIGHTNESS: 1
NAUSEA: 0
CONSTIPATION: 0
SINUS PRESSURE: 0
PHOTOPHOBIA: 0
RHINORRHEA: 0
BACK PAIN: 0
VOMITING: 0
BLOOD IN STOOL: 0

## 2022-12-06 ASSESSMENT — PAIN SCALES - GENERAL
PAINLEVEL_OUTOF10: 0
PAINLEVEL_OUTOF10: 8
PAINLEVEL_OUTOF10: 3
PAINLEVEL_OUTOF10: 8
PAINLEVEL_OUTOF10: 0

## 2022-12-06 NOTE — ED PROVIDER NOTES
[facility]  Department of Emergency Medicine     Written by: Libby Rivera MD  Patient Name: Singh Vásquez  Attending Provider: Jayson Dakins, DO  Admit Date: 2022  7:21 AM  MRN: [id number]                   : 1960        Chief Complaint   Patient presents with    Chest Pain     Chest pressure onset 0400, mid sternal non radiating    Dizziness    - Chief complaint    HPI   Patient is a 58-year-old female with a past medical history of CAD status post 2 stents placed in 2017, type 2 diabetes, hyperlipidemia, hypertension presenting with chest pain and dizziness. Patient awoke this morning around 4 AM and while she was in the bathroom, she started experiencing chest pressure. She describes the sensation as a dull, achy, radiating through to the back, and different from her prior presentation. Patient states that her pressure-like sensation is now evolving into pain with the same distribution. Patient took 81 mg of aspirin which did not improve her symptoms. Patient had associated dizziness and shortness of breath. Patient states her prior presentation was \"fluttering\" when she exerted herself. Patient denies anticoagulant use. Patient denies smoking history, alcohol use, or drug use. Patient denies headache, lightheadedness, numbness, weakness, tingling, loss of sensation, loss of consciousness, fever, cough, sore throat, nausea, vomiting, abdominal pain, hematuria, dysuria, increasing or decreasing urinary frequency, blood in stool, constipation, diarrhea, leg pain, leg swelling, recent travel, recent illness, recent surgery, or sick contacts. Review of Systems   Constitutional:  Negative for activity change, chills, fatigue and fever. HENT:  Negative for congestion, postnasal drip, rhinorrhea, sinus pressure and sore throat. Eyes:  Negative for photophobia, discharge, redness and visual disturbance. Respiratory:  Positive for chest tightness.  Negative for cough, shortness of breath and wheezing. Cardiovascular:  Positive for chest pain. Negative for palpitations and leg swelling. Gastrointestinal:  Negative for abdominal distention, abdominal pain, blood in stool, constipation, diarrhea, nausea and vomiting. Endocrine: Negative for cold intolerance and heat intolerance. Genitourinary:  Negative for decreased urine volume, difficulty urinating, dysuria, flank pain, frequency, hematuria and urgency. Musculoskeletal:  Negative for arthralgias, back pain, joint swelling and myalgias. Skin:  Negative for rash and wound. Allergic/Immunologic: Negative for immunocompromised state. Neurological:  Positive for dizziness. Negative for syncope, facial asymmetry, weakness, light-headedness, numbness and headaches. Hematological:  Does not bruise/bleed easily. Psychiatric/Behavioral:  Negative for behavioral problems and hallucinations. Physical Exam  Constitutional:       General: She is not in acute distress. Appearance: Normal appearance. She is not ill-appearing, toxic-appearing or diaphoretic. HENT:      Head: Normocephalic and atraumatic. Right Ear: Hearing normal.      Left Ear: Hearing normal.      Nose: Nose normal.      Mouth/Throat:      Lips: Pink. Mouth: Mucous membranes are moist.      Pharynx: Oropharynx is clear. Eyes:      Extraocular Movements: Extraocular movements intact. Conjunctiva/sclera: Conjunctivae normal.      Pupils: Pupils are equal, round, and reactive to light. Cardiovascular:      Rate and Rhythm: Normal rate and regular rhythm. Pulses: Normal pulses. Radial pulses are 2+ on the right side and 2+ on the left side. Posterior tibial pulses are 2+ on the right side and 2+ on the left side. Heart sounds: S1 normal and S2 normal.   Pulmonary:      Effort: Pulmonary effort is normal. No tachypnea, accessory muscle usage or respiratory distress.       Breath sounds: Normal breath sounds and air entry. No decreased breath sounds, wheezing, rhonchi or rales. Chest:      Chest wall: No mass, lacerations, deformity, swelling, tenderness, crepitus or edema. Abdominal:      General: Abdomen is flat. Bowel sounds are normal. There is no distension. Palpations: Abdomen is soft. There is no fluid wave or mass. Tenderness: There is no abdominal tenderness. There is no right CVA tenderness, left CVA tenderness or guarding. Musculoskeletal:         General: No swelling or tenderness. Normal range of motion. Cervical back: Normal range of motion and neck supple. No rigidity. Right lower leg: No edema. Left lower leg: No edema. Lymphadenopathy:      Cervical: No cervical adenopathy. Skin:     General: Skin is warm and dry. Capillary Refill: Capillary refill takes less than 2 seconds. Findings: No bruising, lesion or rash. Neurological:      General: No focal deficit present. Mental Status: She is alert and oriented to person, place, and time. Mental status is at baseline. Motor: No weakness. Psychiatric:         Attention and Perception: Attention normal.         Mood and Affect: Mood and affect normal.         Behavior: Behavior is cooperative. EKG Interpretation 0709    Interpreted by emergency department physician    Rhythm: normal sinus   Rate: normal  Axis: left  Ectopy: none  Conduction: right bundle branch block (complete) and left anterior fasciclar block  ST Segments: no acute change  T Waves: Inverted or biphasic T waves seen in V3-V6, T wave inversion seen in III, aVF  Q Waves: none    Clinical Impression: Above findings. Cannot compare with old EKG in 2017. EKG Interpretation 0813    Interpreted by emergency department physician    Rhythm: normal sinus   Rate: normal  Axis: left  Ectopy: none  Conduction: right bundle branch block (complete) and left anterior fasciclar block  ST Segments: no acute change  T Waves:  Inverted or biphasic T waves seen in V3-V6, T wave inversion seen in III, aVF  Q Waves: none    Clinical Impression: no acute changes when compared with prior EKG obtained earlier today    Aura Wade MD      Procedures       MDM  Number of Diagnoses or Management Options  NSTEMI (non-ST elevated myocardial infarction) [facility])  Diagnosis management comments: Patient is a 68-year-old female with a past medical history of CAD status post 2 stents placed in 2017, type 2 diabetes, hyperlipidemia, hypertension presenting with chest pain and dizziness. At the time of initial examination the patient is hypertensive but otherwise hemodynamically stable. EKGs as interpreted by me above. Concern for acute coronary syndrome in this patient. Patient was given 234 mg of aspirin and sublingual nitroglycerin for her symptoms. Upon reevaluation, the patient still experiencing some chest pain. Repeat EKG as interpreted by me as above. Patient was given another sublingual nitroglycerin and 25 mcg of fentanyl. Upon reevaluation, patient is experiencing significant relief of her symptoms. Labs as interpreted by me are as follows. CBC, PT/INR, and APTT are all within normal limits. CMP shows evidence of hyponatremia with a sodium of 128. Initial troponin 67. Repeat troponin delta is +15. Chest x-ray shows no acute pathology as interpreted by me. Patient was started on heparin infusion and statin medications. Case was discussed with Dr. Bautista Bradshaw, cardiologist, who agrees with plan to admit at [facility] recommends nitro paste for further symptom control. Patient was reevaluated and is changing relief of her symptoms. Patient was explained the results of her blood work and her imaging as well as the evidence of NSTEMI. Patient demonstrates good understanding and is agreeable for admission. Case was discussed with Dr. Robyn Velarde, hospitalist, who agrees to admit the patient.   At the time admission, the patient demonstrated good understanding of her condition, had no questions, and was hemodynamically stable. ED Course as of 22 1456   Tue Dec 06, 2022   0724 EC  Normal sinus rhythm  H76  Rbbb, lafb, left axis deviation, anterolateral t wave changes, previous inferior mi  Cannot open old ecg to compare [EUGENE]   0820 The patient was reevaluated and states that her pain is from a 5 to a 4 after sublingual nitroglycerin and aspirin. Patient's old EKG was faxed over from the facility showing that patient did not have prior T wave inversion seen in the anterior lateral leads. T wave inversion seen in inferior leads appear to be old [VG]   0915 Pt ree-valuated now chest pain free feels much better [EUGENE]   1038 Case was discussed with Dr. Delfina Montgomery, cardiologist, who like the patient admitted here and started on Nitropaste for her symptoms. Agrees with the read of new T wave inversions in the lateral leads and possible NSTEMI. [VG]   1052 D/w dr Carley Moses will admit, dr pierson resident admitting in ed now [EUGENE]   1052 Critical care:  Please note that the withdrawal or failure to initiate urgent interventions for this patient would likely result in a life threatening deterioration or permanent disability. Accordingly this patient received 33 minutes of critical care time, excluding separately billable procedures. [EUGENE]   1053 ECG: #2 0813  Normal sinus rhythm  Bifasicular block, nonspecific t wave abnormality similar to previous [EUGENE]      ED Course User Index  [EUGENE] Priyank Callahan DO  [VG] Barbara Arriaza MD       --------------------------------------------- PAST HISTORY ---------------------------------------------  Past Medical History:  has a past medical history of 2019 novel coronavirus disease (COVID-19), Coronary artery disease involving native coronary artery of native heart without angina pectoris, Diabetes mellitus ([address] Hyperlipidemia, and Hypertension.     Past Surgical History:  has a past surgical history that includes Tonsillectomy;  section; and Coronary angioplasty with stent (04/10/2017). Social History:  reports that she has never smoked. She has never used smokeless tobacco. She reports that she does not drink alcohol and does not use drugs. Family History: family history includes Dementia in her mother; Diabetes in her maternal grandfather; High Blood Pressure in her father. The patients home medications have been reviewed.     Allergies: Metformin    -------------------------------------------------- RESULTS -------------------------------------------------    LABS:  Results for orders placed or performed during the hospital encounter of 22   CBC with Auto Differential   Result Value Ref Range    WBC 5.6 4.5 - 11.5 E9/L    RBC 4.84 3.50 - 5.50 E12/L    Hemoglobin 13.1 11.5 - 15.5 g/dL    Hematocrit 39.2 34.0 - 48.0 %    MCV 81.0 80.0 - 99.9 fL    MCH 27.1 26.0 - 35.0 pg    MCHC 33.4 32.0 - 34.5 %    RDW 13.4 11.5 - 15.0 fL    Platelets 212 088 - 527 E9/L    MPV 10.5 7.0 - 12.0 fL    Neutrophils % 59.2 43.0 - 80.0 %    Immature Granulocytes % 0.9 0.0 - 5.0 %    Lymphocytes % 30.4 20.0 - 42.0 %    Monocytes % 6.1 2.0 - 12.0 %    Eosinophils % 2.3 0.0 - 6.0 %    Basophils % 1.1 0.0 - 2.0 %    Neutrophils Absolute 3.31 1.80 - 7.30 E9/L    Immature Granulocytes # 0.05 E9/L    Lymphocytes Absolute 1.70 1.50 - 4.00 E9/L    Monocytes Absolute 0.34 0.10 - 0.95 E9/L    Eosinophils Absolute 0.13 0.05 - 0.50 E9/L    Basophils Absolute 0.06 0.00 - 0.20 E9/L   CMP   Result Value Ref Range    Sodium 128 (L) 132 - 146 mmol/L    Potassium 4.3 3.5 - 5.0 mmol/L    Chloride 93 (L) 98 - 107 mmol/L    CO2 23 22 - 29 mmol/L    Anion Gap 12 7 - 16 mmol/L    Glucose 345 (H) 74 - 99 mg/dL    BUN 13 6 - 23 mg/dL    Creatinine 0.5 0.5 - 1.0 mg/dL    Est, Glom Filt Rate >60 >=60 mL/min/1.73    Calcium 9.8 8.6 - 10.2 mg/dL    Total Protein 6.4 6.4 - 8.3 g/dL    Albumin 3.9 3.5 - 5.2 g/dL    Total Bilirubin 0.3 0.0 - 1.2 mg/dL    Alkaline Phosphatase 81 35 - 104 U/L    ALT 7 0 - 32 U/L    AST 17 0 - 31 U/L   Troponin   Result Value Ref Range    Troponin, High Sensitivity 67 (H) 0 - 9 ng/L   Brain Natriuretic Peptide   Result Value Ref Range    Pro-BNP 85 0 - 125 pg/mL   Protime-INR   Result Value Ref Range    Protime 11.0 9.3 - 12.4 sec    INR 1.0    APTT   Result Value Ref Range    aPTT 27.3 24.5 - 35.1 sec   Troponin   Result Value Ref Range    Troponin, High Sensitivity 82 (H) 0 - 9 ng/L   EKG 12 Lead   Result Value Ref Range    Ventricular Rate 76 BPM    Atrial Rate 76 BPM    P-R Interval 168 ms    QRS Duration 128 ms    Q-T Interval 384 ms    QTc Calculation (Bazett) 432 ms    P Axis 60 degrees    R Axis -68 degrees    T Axis -11 degrees   EKG 12 Lead   Result Value Ref Range    Ventricular Rate 82 BPM    Atrial Rate 82 BPM    P-R Interval 174 ms    QRS Duration 122 ms    Q-T Interval 392 ms    QTc Calculation (Bazett) 457 ms    P Axis 35 degrees    R Axis -70 degrees    T Axis -36 degrees       RADIOLOGY:  XR CHEST PORTABLE   Final Result   No acute process. ------------------------- NURSING NOTES AND VITALS REVIEWED ---------------------------  Date / Time Roomed:  12/6/2022  7:21 AM  ED Bed Assignment:  16/16    The nursing notes within the ED encounter and vital signs as below have been reviewed.      Patient Vitals for the past 24 hrs:   BP Temp Temp src Pulse Resp SpO2 Height Weight   12/06/22 1356 121/83 -- -- 88 -- -- -- --   12/06/22 0852 107/69 -- -- 73 -- -- -- --   12/06/22 0810 131/80 -- -- 69 -- -- -- --   12/06/22 0714 (!) 148/94 98.2 °F (36.8 °C) Oral 80 18 99 % 5' 6\" (1.676 m) 155 lb (70.3 kg)       Oxygen Saturation Interpretation: Normal    ------------------------------------------ PROGRESS NOTES ------------------------------------------  Re-evaluation(s):  Time: multiple  Patients symptoms are improving  Repeat physical examination is significantly improved    Counseling:  I have spoken with the patient and discussed todays results, in addition to providing specific details for the plan of care and counseling regarding the diagnosis and prognosis. Their questions are answered at this time and they are agreeable with the plan of admission.    --------------------------------- ADDITIONAL PROVIDER NOTES ---------------------------------  Consultations:  Cardiology: Dr. Ana Rosa Haq, who agrees that the patient likely suffered an NSTEMI and recommends that we add on statins and nitroglycerin paste for symptom control. Internal medicine: Spoke with Dr. Olu Jon. Discussed case. They will admit the patient. This patient's ED course included: a personal history and physicial examination, re-evaluation prior to disposition, multiple bedside re-evaluations, IV medications, cardiac monitoring, continuous pulse oximetry, and complex medical decision making and emergency management    This patient has remained hemodynamically stable during their ED course. Diagnosis:  1. NSTEMI (non-ST elevated myocardial infarction) ([facility] [address]        Disposition:  Patient's disposition: Admit to telemetry  Patient's condition is stable. Patient was seen and evaluated by myself and my attending Lavonne Anglin DO. Assessment and Plan discussed with attending provider, please see attestation for final plan of care.      MD Yanira An MD  Resident  12/06/22 2675

## 2022-12-06 NOTE — CONSULTS
[facility] Cardiology Consult     INPATIENT CARDIOLOGY CONSULT    Name: Margot Ga    Age: 57 y.o. Date of Admission: 2022  7:21 AM    Date of Service: 2022    Reason for Consultation: Chest pain    Chief Complaint: Same    Referring Physician: Dr. Lincoln Cavazos    History of Present Illness: The patient is a 57y.o. year old female who presents to the emergency department after onset of moderate midsternal chest pressure and shortness of breath after getting up early this morning. No associated nausea or vomiting. No diaphoresis. On ED arrival her EKG is reviewed and shows sinus rhythm with chronic right bundle branch block and new lateral T wave changes. High-sensitivity troponin increased to 82 and she has received aspirin, IV heparin and placed on Nitropaste. Currently pain-free and on room air oxygen with stable blood pressure. History of inferior infarction and RCA MIKEL in 2017, chronic right bundle branch block, hyperlipidemia/hypertriglyceridemia with previous refusal to take statins    Chest x-ray, EKGs and telemetry independently reviewed.   EKG as described above  Telemetry: Sinus rhythm      Past Medical History:   Past Medical History:   Diagnosis Date    2019 novel coronavirus disease (COVID-19) 11/10/2021    Coronary artery disease involving native coronary artery of native heart without angina pectoris     Diabetes mellitus ([facility] [address]     Hyperlipidemia     Hypertension        Past Surgical History:  Past Surgical History:   Procedure Laterality Date     SECTION      3  sections    CORONARY ANGIOPLASTY WITH STENT PLACEMENT  04/10/2017    3.0/38 and 3.0/20 Synergy stents to mid and distal RCA by Dr. Maribel Escobedo         Family History:  Family History   Problem Relation Age of Onset    Diabetes Maternal Grandfather     Dementia Mother     High Blood Pressure Father        Social History:  Social History     Socioeconomic History    Marital status:  Spouse name: Not on file    Number of children: Not on file    Years of education: Not on file    Highest education level: Not on file   Occupational History    Not on file   Tobacco Use    Smoking status: Never    Smokeless tobacco: Never   Substance and Sexual Activity    Alcohol use: No    Drug use: No    Sexual activity: Not on file   Other Topics Concern    Not on file   Social History Narrative    Not on file     Social Determinants of Health     Financial Resource Strain: Not on file   Food Insecurity: Not on file   Transportation Needs: Not on file   Physical Activity: Not on file   Stress: Not on file   Social Connections: Not on file   Intimate Partner Violence: Not on file   Housing Stability: Not on file       Allergies: Allergies   Allergen Reactions    Metformin Diarrhea       Home Meds:  Prior to Admission medications    Medication Sig Start Date End Date Taking? Authorizing Provider   aspirin 81 MG chewable tablet Take 81 mg by mouth in the morning and 81 mg in the evening.    Yes Historical Provider, MD   magnesium oxide (MAG-OX) 400 MG tablet Take 400 mg by mouth nightly   Yes Historical Provider, MD   calcium carbonate 600 MG TABS tablet Take 600 mg by mouth nightly   Yes Historical Provider, MD   Omega-3 Fatty Acids (FISH OIL) 1000 MG capsule Take 1,000 mg by mouth 2 times daily   Yes Historical Provider, MD   metoprolol tartrate (LOPRESSOR) 25 MG tablet Take 25 mg by mouth every morning **SEE OTHER ORDER**   Yes Historical Provider, MD   metoprolol tartrate (LOPRESSOR) 25 MG tablet Take 12.5 mg by mouth nightly **SEE OTHER ORDER**   Yes Historical Provider, MD   lisinopril-hydroCHLOROthiazide (PRINZIDE;ZESTORETIC) 20-12.5 MG per tablet Take 1 tablet by mouth every morning **SEE OTHER ORDER**   Yes Historical Provider, MD   lisinopril-hydroCHLOROthiazide (PRINZIDE;ZESTORETIC) 20-12.5 MG per tablet Take 1 tablet by mouth nightly **SEE OTHER ORDER**   Yes Historical Provider, MD   glipiZIDE (GLUCOTROL) 10 MG tablet Take 10 mg by mouth every morning **SEE OTHER ORDER**   Yes Historical Provider, MD   glipiZIDE (GLUCOTROL) 10 MG tablet Take 5 mg by mouth nightly **SEE OTHER ORDER**   Yes Historical Provider, MD   POTASSIUM PO Take 50 mg by mouth every other day   Yes Historical Provider, MD   nitroGLYCERIN (NITROSTAT) 0.4 MG SL tablet Place 1 tablet under the tongue every 5 minutes as needed for Chest pain up to max of 3 total doses.  If no relief after 1 dose, call 911. 1/21/22   Roxanna Herron,    Cholecalciferol (VITAMIN D3) 5000 UNITS TABS Take 5,000 Units by mouth every morning    Historical Provider, MD       Current Medications:  Current Facility-Administered Medications   Medication Dose Route Frequency Provider Last Rate Last Admin    heparin (porcine) injection 4,000 Units  4,000 Units IntraVENous Once Chance Avalos MD        heparin (porcine) injection 4,000 Units  4,000 Units IntraVENous PRN Chance Avalos MD        heparin (porcine) injection 2,000 Units  2,000 Units IntraVENous PRN Chance Avalos MD        heparin 25,000 units in dextrose 5% 250 mL (premix) infusion  5-30 Units/kg/hr IntraVENous Continuous Chance Avalos MD        nitroglycerin (NITRO-BID) 2 % ointment 0.5 inch  0.5 inch Topical 4 times per day Chance Avalos MD        atorvastatin (LIPITOR) tablet 80 mg  80 mg Oral Once Delle Rolly, DO         Current Outpatient Medications   Medication Sig Dispense Refill    aspirin 81 MG chewable tablet Take 81 mg by mouth in the morning and 81 mg in the evening.      magnesium oxide (MAG-OX) 400 MG tablet Take 400 mg by mouth nightly      calcium carbonate 600 MG TABS tablet Take 600 mg by mouth nightly      Omega-3 Fatty Acids (FISH OIL) 1000 MG capsule Take 1,000 mg by mouth 2 times daily      metoprolol tartrate (LOPRESSOR) 25 MG tablet Take 25 mg by mouth every morning **SEE OTHER ORDER**      metoprolol tartrate (LOPRESSOR) 25 MG tablet Take 12.5 mg by mouth nightly **SEE OTHER ORDER**      lisinopril-hydroCHLOROthiazide (PRINZIDE;ZESTORETIC) 20-12.5 MG per tablet Take 1 tablet by mouth every morning **SEE OTHER ORDER**      lisinopril-hydroCHLOROthiazide (PRINZIDE;ZESTORETIC) 20-12.5 MG per tablet Take 1 tablet by mouth nightly **SEE OTHER ORDER**      glipiZIDE (GLUCOTROL) 10 MG tablet Take 10 mg by mouth every morning **SEE OTHER ORDER**      glipiZIDE (GLUCOTROL) 10 MG tablet Take 5 mg by mouth nightly **SEE OTHER ORDER**      POTASSIUM PO Take 50 mg by mouth every other day      nitroGLYCERIN (NITROSTAT) 0.4 MG SL tablet Place 1 tablet under the tongue every 5 minutes as needed for Chest pain up to max of 3 total doses. If no relief after 1 dose, call 911. 25 tablet 1    Cholecalciferol (VITAMIN D3) 5000 UNITS TABS Take 5,000 Units by mouth every morning        heparin (PORCINE) Infusion         Review of Systems:   Constitutional: No fever, chills, sweats  Cardiac: As per HPI  Pulmonary: No cough, wheeze, hemoptysis  HEENT: No visual disturbances or difficult swallowing  GI: No nausea, vomiting, diarrhea, abdominal pain, rectal bleeding  : No dysuria or hematuria  Endocrine: No excessive thirst, heat or cold intolerance. Musculoskeletal: No joint pain or muscle aches. No claudication  Skin: No skin breakdown or rashes  Neuro: No headache, confusion, or seizures  Psych: No depression, anxiety    Physical Exam:  /69   Pulse 73   Temp 98.2 °F (36.8 °C) (Oral)   Resp 18   Ht 5' 6\" (1.676 m)   Wt 155 lb (70.3 kg)   SpO2 99%   BMI 25.02 kg/m²   Weight change: Wt Readings from Last 3 Encounters:   12/06/22 155 lb (70.3 kg)   01/21/22 156 lb (70.8 kg)   11/10/21 155 lb (70.3 kg)       General: Awake, alert, oriented x3, no acute distress    HEENT: Normocephalic and atraumatic, pupils equal and round. Sclera nonicteric and oral mucosa moist.  Tongue and trachea midline. Neck: No JVD or bruits.   No thyroid enlargement or adenopathy    Cardiac: Regular rate and rhythm, normal S1 and S2, no S3. Apical impulse is normal.  No murmurs, rubs or clicks. No carotid bruits and no abdominal bruits. No peripheral edema, cyanosis or clubbing. Normal pulses in the upper and lower extremities bilaterally. Resp: Unlabored respirations at rest.  No wheezes, rales or rhonchi. Abdomen: soft, nontender, nondistended, no gross organomegaly or mass    Skin: Warm and dry, no cyanosis. Musculoskeletal: normal tone and strength in the upper and lower extremities bilaterally    Neuro: Moves all extremities appropriately to command. Normal sensation to light touch in the upper and lower extremities bilaterally    Psych: Cooperative, and normal affect    Intake/Output:  No intake or output data in the 24 hours ending 12/06/22 1229  No intake/output data recorded. Laboratory Tests:  Lab Results   Component Value Date    CREATININE 0.5 12/06/2022    BUN 13 12/06/2022     (L) 12/06/2022    K 4.3 12/06/2022    CL 93 (L) 12/06/2022    CO2 23 12/06/2022     No results for input(s): CKTOTAL, CKMB, TROPONINI in the last 72 hours.   Lab Results   Component Value Date    PROBNP 85 12/06/2022     Lab Results   Component Value Date/Time    WBC 5.6 12/06/2022 07:34 AM    RBC 4.84 12/06/2022 07:34 AM    HGB 13.1 12/06/2022 07:34 AM    HCT 39.2 12/06/2022 07:34 AM    MCV 81.0 12/06/2022 07:34 AM    MCH 27.1 12/06/2022 07:34 AM    MCHC 33.4 12/06/2022 07:34 AM    RDW 13.4 12/06/2022 07:34 AM     12/06/2022 07:34 AM    MPV 10.5 12/06/2022 07:34 AM     Recent Labs     12/06/22  0734   ALKPHOS 81   ALT 7   AST 17   PROT 6.4   BILITOT 0.3   LABALBU 3.9     No results found for: MG  Lab Results   Component Value Date/Time    PROTIME 11.0 12/06/2022 07:34 AM    INR 1.0 12/06/2022 07:34 AM     No results found for: TSH  No components found for: CHLPL  Lab Results   Component Value Date    TRIG 572 (H) 11/08/2010    TRIG 1,236 (H) 10/27/2010     Lab Results   Component Value Date    HDL 30.7 (A) 11/08/2010    HDL 33.6 (A) 10/27/2010     Lab Results   Component Value Date    LDLCALC - (AA) 11/08/2010    [address] - (AA) 10/27/2010           Active Hospital Problems    Diagnosis     NSTEMI (non-ST elevated myocardial infarction) ([facility] [address] [I21.4]      Priority: Medium    History of right coronary artery stent placement [Z95.5]      Priority: Medium    RBBB (right bundle branch block) [I45.10]      Priority: Medium    Hyperlipidemia [E78.5]              ASSESSMENT / PLAN:  Non-STEMI and currently pain-free with hemodynamic stability, no arrhythmias or CHF. Continue aspirin, Nitropaste and IV heparin. Resume metoprolol and ACE inhibitor as at home. Heart catheterization with possible PCI versus CABG recommended. Risks, benefits and alternatives outlined and all questions answered. She agrees to proceed. Currently stable and will be transferred tomorrow for cath. Chronic stable right bundle branch block    Essential hypertension controlled continue therapy as above    History of severe hyperlipidemia/hypertriglyceridemia. Discussed risks and benefits of statin therapy and she is willing to begin Lipitor. Discussed with ED staff.           Electronically signed by Edwin Acosta MD on 12/6/2022 at 12:29 PM

## 2022-12-06 NOTE — H&P
Internal Medicine History & Physical     Name: Shane Olivera  : 1960  Chief Complaint: Chest Pain (Chest pressure onset 0400, mid sternal non radiating) and Dizziness  Primary Care Physician: Dom Corona DO  Admission date: 2022  Date of service: 2022     History of Present Illness  Corinna Iniguez is a 57y.o. year old female. Who presented the emergency department after having chest tightness this morning approximately 4 AM.  Patient states that she got up to go to the bathroom and felt a \"pressure\" on her chest.  She states she also felt like she could not catch her breath. She said that these symptoms lasted for approximately 20 minutes. She has a relevant past medical history of stents that were placed approximately 5 years ago. Patient states that at this time she is under a lot of stress and is concerned that this could have caused it. Patient is chest pain-free at time of evaluation. She is concerned about taking glipizide. She is also concerned about taking her metoprolol and lisinopril. In the emergency department patient was given fentanyl, nitro, and heparin. Cardiology was consulted and gave recommendations which were followed. EKG showed potentially new anterolateral T wave changes. ED course:   Initial blood work and imaging studies performed. Admission recommended by ED physician. Case was discussed with ED provider.  Meds in ED consisted of the following:  Medications   heparin (porcine) injection 4,000 Units (has no administration in time range)   heparin (porcine) injection 4,000 Units (has no administration in time range)   heparin (porcine) injection 2,000 Units (has no administration in time range)   heparin 25,000 units in dextrose 5% 250 mL (premix) infusion (has no administration in time range)   nitroglycerin (NITRO-BID) 2 % ointment 0.5 inch (has no administration in time range)   atorvastatin (LIPITOR) tablet 80 mg (has no administration in time range) nitroGLYCERIN (NITROSTAT) SL tablet 0.4 mg (0.4 mg SubLINGual Given 22 0810)   aspirin chewable tablet 243 mg (243 mg Oral Given 22 0810)   nitroGLYCERIN (NITROSTAT) SL tablet 0.4 mg (0.4 mg SubLINGual Given 22 0852)   fentaNYL (SUBLIMAZE) injection 25 mcg (25 mcg IntraVENous Given 22 0856)       Past Medical History:   Diagnosis Date    2019 novel coronavirus disease (COVID-19) 11/10/2021    Coronary artery disease involving native coronary artery of native heart without angina pectoris     Diabetes mellitus ([facility] [address]     Hyperlipidemia     Hypertension        Past Surgical History:   Procedure Laterality Date     SECTION      3  sections    CORONARY ANGIOPLASTY WITH STENT PLACEMENT  04/10/2017    3.0/38 and 3.0/20 Synergy stents to mid and distal RCA by Dr. Tavarez Party History:  Family History   Problem Relation Age of Onset    Diabetes Maternal Grandfather     Dementia Mother     High Blood Pressure Father        No pertinent family medical history with regard to current issues. Social History  Patient lives at home with . Patient ambulates without assistance. Illicit drug use- denies  TOBACCO:   reports that she has never smoked. She has never used smokeless tobacco.  ETOH:   reports no history of alcohol use. Home Medications  Prior to Admission medications    Medication Sig Start Date End Date Taking? Authorizing Provider   lisinopril-hydroCHLOROthiazide (PRINZIDE;ZESTORETIC) 20-12.5 MG per tablet Take 1 tablet by mouth 2 times daily 22   Cleo Louis DO   metoprolol tartrate (LOPRESSOR) 25 MG tablet Take 1 tablet by mouth 2 times daily 22   Cleo Louis DO   nitroGLYCERIN (NITROSTAT) 0.4 MG SL tablet Place 1 tablet under the tongue every 5 minutes as needed for Chest pain up to max of 3 total doses.  If no relief after 1 dose, call 911. 22   Cleo Louis DO   glipiZIDE (GLUCOTROL) 10 MG tablet Take 1 tablet by mouth 2 times daily (before meals) 1/21/22 4/21/22  Cleo Louis,    Omega-3 Fatty Acids (FISH OIL DOUBLE STRENGTH) 1200 MG CAPS Take by mouth 2 times daily    Historical Provider, MD   Coenzyme Q10 (COQ-10) 400 MG CAPS Take by mouth daily    Historical Provider, MD   aspirin 81 MG chewable tablet Take 1 tablet by mouth daily 4/11/17   Adrien Serrano DO   Calcium-Magnesium 200-50 MG TABS Take 1 tablet by mouth daily     Historical Provider, MD   Cholecalciferol (VITAMIN D3) 5000 UNITS TABS Take 1 tablet by mouth daily     Historical Provider, MD       Allergies  Allergies   Allergen Reactions    Metformin        Review of Systems:   Please see HPI above. All bolded are positive. All un-bolded are negative.   Constitutional Symptoms: fever, chills, fatigue, generalized weakness, diaphoresis, increase in thirst, loss of appetite  Eyes: vision change   Ears, Nose, Mouth, Throat: hearing loss, nasal congestion, sores in the mouth  Cardiovascular: chest pain, chest heaviness, palpitations  Respiratory: shortness of breath, wheezing, coughing  Gastrointestinal: abdominal pain, nausea, vomiting, diarrhea, constipation, melena, hematochezia, hematemesis  Genitourinary: dysuria, hematuria, increased frequency  Musculoskeletal: lower extremity edema, myalgias, arthralgias, back pain  Integumentary: rashes, itching   Neurological: headache, lightheadedness, dizziness, confusion, syncope, numbness, tingling, focal weakness  Psychiatric: depression, suicidal ideation, anxiety  Endocrine: unintentional weight change  Hematologic/Lymphatic: lymphadenopathy, easy bruising, easy bleeding   Allergic/Immunologic: recurrent infections      Objective  VITALS:  /69   Pulse 73   Temp 98.2 °F (36.8 °C) (Oral)   Resp 18   Ht 5' 6\" (1.676 m)   Wt 155 lb (70.3 kg)   SpO2 99%   BMI 25.02 kg/m²     Physical Exam:   General: awake, alert, oriented to person, place, time, and purpose, appears stated age, cooperative, no acute distress, pleasant, appropriate mood  Eyes: conjunctivae/corneas clear, sclera non icteric, EOMI  Ears: no obvious scars, no lesions, no masses, hearing intact  Mouth: mucous membranes moist, no obvious oral sores  Head: normocephalic, atraumatic  Neck: no JVD, no adenopathy, no thyromegaly, neck is supple, trachea is midline  Back: ROM normal, no CVA tenderness. Chest: no pain on palpation  Lungs: clear to auscultation bilaterally, without rhonchi, crackle, wheezing, or rale, no retractions or use of accessory muscles  Heart: regular rate and regular rhythm, no murmur, normal S1, S2  Abdomen: soft, non-tender; bowel sounds normal; no masses, no organomegaly  : Deferred   Extremities: no lower extremity edema, extremities atraumatic, no cyanosis, no clubbing, 2+ pedal pulses palpated  Skin: normal color, normal texture, normal turgor, no rashes, no lesions  Neurologic:5/5 muscle strength throughout, normal muscle tone throughout, face symmetric, hearing intact, tongue midline, speech appropriate without slurring, sensation to fine touch intact in upper and lower extremities    Labs-   Lab Results   Component Value Date    WBC 5.6 12/06/2022    HGB 13.1 12/06/2022    HCT 39.2 12/06/2022     12/06/2022     (L) 12/06/2022    K 4.3 12/06/2022    CL 93 (L) 12/06/2022    CREATININE 0.5 12/06/2022    BUN 13 12/06/2022    CO2 23 12/06/2022    GLUCOSE 345 (H) 12/06/2022    ALT 7 12/06/2022    AST 17 12/06/2022    INR 1.0 12/06/2022     No results found for: CKTOTAL, CKMB, CKMBINDEX, TROPONINI    Echocardiogram:      Recent Radiological Studies:  XR CHEST PORTABLE   Final Result   No acute process.              Assessment  Active Hospital Problems    Diagnosis     NSTEMI (non-ST elevated myocardial infarction) ([facility] [address] [I21.4]      Priority: Medium    History of right coronary artery stent placement [Z95.5]      Priority: Medium    RBBB (right bundle branch block) [I45.10]      Priority: Medium    Hyperlipidemia [E78.5]        Patient Active Problem List    Diagnosis Date Noted    NSTEMI (non-ST elevated myocardial infarction) ([facility] [address] 12/06/2022     Priority: Medium    History of right coronary artery stent placement 12/06/2022     Priority: Medium    RBBB (right bundle branch block) 12/06/2022     Priority: Medium    Coronary artery disease involving native coronary artery of native heart without angina pectoris 01/23/2022    2019 novel coronavirus disease (COVID-19) 11/10/2021    Benign essential hypertension 06/03/2011    Hyperlipidemia 06/03/2011    Type 2 diabetes mellitus ([facility] [address] 06/03/2011       Plan  NSTEMI  Troponins noted  Cardiology consultation   Start heparin drip now   Discussed with Dr Delfina Montgomery   Cath tomorrow morning   Continue home medications as ordered   PT/OT  Follow labs  DVT prophylaxis. Please see orders for further management and care.  for discharge planning  Discharge plan: TBD pending clinical improvement     Champ Plasencia DO  12/6/2022  11:06 AM    I can be reached through [employer]. NOTE:  This report was transcribed using voice recognition software. Every effort was made to ensure accuracy; however, inadvertent computerized transcription errors may be present. Addendum: I have personally participated in an independent face-to-face history and physical exam on the date of service with the patient. I have independently formulated and executed the above assessment and plan. The vitals, labs, imaging, medications and treatment plan were reviewed independently by myself in addition to with the resident doctor. I agree with the above documentation and treatment plan     Electronically signed by Jerri Perkins MD on 12/6/2022 at 2:56 PM    I can be reached through [facility].

## 2022-12-07 ENCOUNTER — HOSPITAL ENCOUNTER (INPATIENT)
Dept: CARDIAC CATH/INVASIVE PROCEDURES | Age: 62
LOS: 6 days | Discharge: HOME HEALTH | DRG: 233 | End: 2022-12-15
Attending: INTERNAL MEDICINE | Admitting: STUDENT IN AN ORGANIZED HEALTH CARE EDUCATION/TRAINING PROGRAM

## 2022-12-07 VITALS
BODY MASS INDEX: 24.91 KG/M2 | SYSTOLIC BLOOD PRESSURE: 126 MMHG | WEIGHT: 155 LBS | DIASTOLIC BLOOD PRESSURE: 82 MMHG | OXYGEN SATURATION: 99 % | HEIGHT: 66 IN | TEMPERATURE: 97.2 F | RESPIRATION RATE: 18 BRPM | HEART RATE: 78 BPM

## 2022-12-07 DIAGNOSIS — Z95.1 S/P CABG (CORONARY ARTERY BYPASS GRAFT): Primary | ICD-10-CM

## 2022-12-07 PROBLEM — I25.10 CAD IN NATIVE ARTERY: Status: ACTIVE | Noted: 2022-12-07

## 2022-12-07 LAB
ABO/RH: NORMAL
ALBUMIN SERPL-MCNC: 4.1 G/DL (ref 3.5–5.2)
ALP BLD-CCNC: 71 U/L (ref 35–104)
ALT SERPL-CCNC: 18 U/L (ref 0–32)
ANION GAP SERPL CALCULATED.3IONS-SCNC: 14 MMOL/L (ref 7–16)
ANTIBODY SCREEN: NORMAL
APTT: 42.2 SEC (ref 24.5–35.1)
APTT: 45.9 SEC (ref 24.5–35.1)
AST SERPL-CCNC: 20 U/L (ref 0–31)
BASOPHILS ABSOLUTE: 0.05 E9/L (ref 0–0.2)
BASOPHILS RELATIVE PERCENT: 0.7 % (ref 0–2)
BILIRUB SERPL-MCNC: 0.3 MG/DL (ref 0–1.2)
BUN BLDV-MCNC: 9 MG/DL (ref 6–23)
CALCIUM SERPL-MCNC: 9.4 MG/DL (ref 8.6–10.2)
CHLORIDE BLD-SCNC: 100 MMOL/L (ref 98–107)
CO2: 20 MMOL/L (ref 22–29)
CREAT SERPL-MCNC: 0.6 MG/DL (ref 0.5–1)
EOSINOPHILS ABSOLUTE: 0.22 E9/L (ref 0.05–0.5)
EOSINOPHILS RELATIVE PERCENT: 3.1 % (ref 0–6)
GFR SERPL CREATININE-BSD FRML MDRD: >60 ML/MIN/1.73
GLUCOSE BLD-MCNC: 317 MG/DL (ref 74–99)
HBA1C MFR BLD: 12 % (ref 4–5.6)
HCT VFR BLD CALC: 38.2 % (ref 34–48)
HEMOGLOBIN: 12.6 G/DL (ref 11.5–15.5)
IMMATURE GRANULOCYTES #: 0.06 E9/L
IMMATURE GRANULOCYTES %: 0.8 % (ref 0–5)
LYMPHOCYTES ABSOLUTE: 3.26 E9/L (ref 1.5–4)
LYMPHOCYTES RELATIVE PERCENT: 45.5 % (ref 20–42)
MCH RBC QN AUTO: 26.5 PG (ref 26–35)
MCHC RBC AUTO-ENTMCNC: 33 % (ref 32–34.5)
MCV RBC AUTO: 80.4 FL (ref 80–99.9)
METER GLUCOSE: 287 MG/DL (ref 74–99)
MONOCYTES ABSOLUTE: 0.42 E9/L (ref 0.1–0.95)
MONOCYTES RELATIVE PERCENT: 5.9 % (ref 2–12)
NEUTROPHILS ABSOLUTE: 3.16 E9/L (ref 1.8–7.3)
NEUTROPHILS RELATIVE PERCENT: 44 % (ref 43–80)
PDW BLD-RTO: 13.7 FL (ref 11.5–15)
PLATELET # BLD: 273 E9/L (ref 130–450)
PMV BLD AUTO: 10.7 FL (ref 7–12)
POC ACT LR: 165 SECONDS
POTASSIUM REFLEX MAGNESIUM: 4.1 MMOL/L (ref 3.5–5)
RBC # BLD: 4.75 E12/L (ref 3.5–5.5)
SODIUM BLD-SCNC: 134 MMOL/L (ref 132–146)
TOTAL PROTEIN: 6.5 G/DL (ref 6.4–8.3)
WBC # BLD: 7.2 E9/L (ref 4.5–11.5)

## 2022-12-07 PROCEDURE — 85347 COAGULATION TIME ACTIVATED: CPT

## 2022-12-07 PROCEDURE — C1713 ANCHOR/SCREW BN/BN,TIS/BN: HCPCS

## 2022-12-07 PROCEDURE — 86901 BLOOD TYPING SEROLOGIC RH(D): CPT

## 2022-12-07 PROCEDURE — G0378 HOSPITAL OBSERVATION PER HR: HCPCS

## 2022-12-07 PROCEDURE — 93458 L HRT ARTERY/VENTRICLE ANGIO: CPT

## 2022-12-07 PROCEDURE — 93458 L HRT ARTERY/VENTRICLE ANGIO: CPT | Performed by: INTERNAL MEDICINE

## 2022-12-07 PROCEDURE — 82962 GLUCOSE BLOOD TEST: CPT

## 2022-12-07 PROCEDURE — 85025 COMPLETE CBC W/AUTO DIFF WBC: CPT

## 2022-12-07 PROCEDURE — 6370000000 HC RX 637 (ALT 250 FOR IP)

## 2022-12-07 PROCEDURE — 6360000002 HC RX W HCPCS

## 2022-12-07 PROCEDURE — 86850 RBC ANTIBODY SCREEN: CPT

## 2022-12-07 PROCEDURE — 85730 THROMBOPLASTIN TIME PARTIAL: CPT

## 2022-12-07 PROCEDURE — C1894 INTRO/SHEATH, NON-LASER: HCPCS

## 2022-12-07 PROCEDURE — 36415 COLL VENOUS BLD VENIPUNCTURE: CPT

## 2022-12-07 PROCEDURE — 96360 HYDRATION IV INFUSION INIT: CPT

## 2022-12-07 PROCEDURE — 4A023N7 MEASUREMENT OF CARDIAC SAMPLING AND PRESSURE, LEFT HEART, PERCUTANEOUS APPROACH: ICD-10-PCS | Performed by: INTERNAL MEDICINE

## 2022-12-07 PROCEDURE — 86900 BLOOD TYPING SEROLOGIC ABO: CPT

## 2022-12-07 PROCEDURE — 2580000003 HC RX 258: Performed by: INTERNAL MEDICINE

## 2022-12-07 PROCEDURE — 80053 COMPREHEN METABOLIC PANEL: CPT

## 2022-12-07 PROCEDURE — 2500000003 HC RX 250 WO HCPCS

## 2022-12-07 PROCEDURE — B2151ZZ FLUOROSCOPY OF LEFT HEART USING LOW OSMOLAR CONTRAST: ICD-10-PCS | Performed by: INTERNAL MEDICINE

## 2022-12-07 PROCEDURE — C1887 CATHETER, GUIDING: HCPCS

## 2022-12-07 PROCEDURE — 83036 HEMOGLOBIN GLYCOSYLATED A1C: CPT

## 2022-12-07 PROCEDURE — 6370000000 HC RX 637 (ALT 250 FOR IP): Performed by: INTERNAL MEDICINE

## 2022-12-07 PROCEDURE — 96361 HYDRATE IV INFUSION ADD-ON: CPT

## 2022-12-07 PROCEDURE — G0379 DIRECT REFER HOSPITAL OBSERV: HCPCS

## 2022-12-07 PROCEDURE — B2111ZZ FLUOROSCOPY OF MULTIPLE CORONARY ARTERIES USING LOW OSMOLAR CONTRAST: ICD-10-PCS | Performed by: INTERNAL MEDICINE

## 2022-12-07 PROCEDURE — C1769 GUIDE WIRE: HCPCS

## 2022-12-07 PROCEDURE — 2709999900 HC NON-CHARGEABLE SUPPLY

## 2022-12-07 RX ORDER — SODIUM CHLORIDE 9 MG/ML
INJECTION, SOLUTION INTRAVENOUS PRN
Status: DISCONTINUED | OUTPATIENT
Start: 2022-12-07 | End: 2022-12-12

## 2022-12-07 RX ORDER — SODIUM CHLORIDE 9 MG/ML
INJECTION, SOLUTION INTRAVENOUS CONTINUOUS
Status: ACTIVE | OUTPATIENT
Start: 2022-12-07 | End: 2022-12-07

## 2022-12-07 RX ORDER — SODIUM CHLORIDE 0.9 % (FLUSH) 0.9 %
5-40 SYRINGE (ML) INJECTION EVERY 12 HOURS SCHEDULED
Status: DISCONTINUED | OUTPATIENT
Start: 2022-12-07 | End: 2022-12-12

## 2022-12-07 RX ORDER — SODIUM CHLORIDE 0.9 % (FLUSH) 0.9 %
5-40 SYRINGE (ML) INJECTION PRN
Status: DISCONTINUED | OUTPATIENT
Start: 2022-12-07 | End: 2022-12-12

## 2022-12-07 RX ORDER — ACETAMINOPHEN 325 MG/1
650 TABLET ORAL EVERY 4 HOURS PRN
Status: DISCONTINUED | OUTPATIENT
Start: 2022-12-07 | End: 2022-12-12

## 2022-12-07 RX ORDER — OMEGA-3-ACID ETHYL ESTERS 1 G/1
2 CAPSULE, LIQUID FILLED ORAL 2 TIMES DAILY
Status: DISCONTINUED | OUTPATIENT
Start: 2022-12-07 | End: 2022-12-07 | Stop reason: HOSPADM

## 2022-12-07 RX ADMIN — NITROGLYCERIN 0.5 INCH: 20 OINTMENT TOPICAL at 08:03

## 2022-12-07 RX ADMIN — HEPARIN SODIUM 2000 UNITS: 1000 INJECTION INTRAVENOUS; SUBCUTANEOUS at 09:49

## 2022-12-07 RX ADMIN — ASPIRIN 81 MG: 81 TABLET, COATED ORAL at 08:03

## 2022-12-07 RX ADMIN — OMEGA-3-ACID ETHYL ESTERS 2 G: 900 CAPSULE ORAL at 08:03

## 2022-12-07 RX ADMIN — HEPARIN SODIUM 2000 UNITS: 1000 INJECTION INTRAVENOUS; SUBCUTANEOUS at 02:50

## 2022-12-07 RX ADMIN — NITROGLYCERIN 0.5 INCH: 20 OINTMENT TOPICAL at 02:13

## 2022-12-07 RX ADMIN — METOPROLOL TARTRATE 25 MG: 25 TABLET, FILM COATED ORAL at 08:03

## 2022-12-07 RX ADMIN — SODIUM CHLORIDE: 9 INJECTION, SOLUTION INTRAVENOUS at 17:41

## 2022-12-07 ASSESSMENT — PAIN SCALES - GENERAL
PAINLEVEL_OUTOF10: 0
PAINLEVEL_OUTOF10: 0

## 2022-12-07 NOTE — PROGRESS NOTES
[facility] Cardiology     INPATIENT CARDIOLOGY PROGRESS NOTE    Name: Corey Mckeonred    Age: 57 y.o. Date of Admission: 2022  7:21 AM    Date of Service: 2022    Reason for Consultation: Chest pain    Chief Complaint: Same    Referring Physician: Dr. Armida Wheatley    History of Present Illness: The patient is a 57y.o. year old female who presents to the emergency department after onset of moderate midsternal chest pressure and shortness of breath after getting up early this morning. No associated nausea or vomiting. No diaphoresis. On ED arrival her EKG is reviewed and shows sinus rhythm with chronic right bundle branch block and new lateral T wave changes. High-sensitivity troponin increased to 82 and she has received aspirin, IV heparin and placed on Nitropaste. Currently pain-free and on room air oxygen with stable blood pressure. History of inferior infarction and RCA MIKEL in , chronic right bundle branch block, hyperlipidemia/hypertriglyceridemia with previous refusal to take statins    Chest x-ray, EKGs and telemetry independently reviewed. EKG as described above  Telemetry: Sinus rhythm     interim history:  No recurrent chest pain since admission and resting comfortably. Lying flat without shortness of breath. No palpitations.   Image reviewed: Sinus rhythm  LDL not calculable, triglyceride 2539      Past Medical History:   Past Medical History:   Diagnosis Date    2019 novel coronavirus disease (COVID-19) 11/10/2021    Coronary artery disease involving native coronary artery of native heart without angina pectoris     Diabetes mellitus ([facility] [address]     Hyperlipidemia     Hypertension        Past Surgical History:  Past Surgical History:   Procedure Laterality Date     SECTION      3  sections    CORONARY ANGIOPLASTY WITH STENT PLACEMENT  04/10/2017    3.0/38 and 3.0/20 Synergy stents to mid and distal RCA by Dr. Ezekiel Rosa         Family History:  Family History Problem Relation Age of Onset    Diabetes Maternal Grandfather     Dementia Mother     High Blood Pressure Father        Social History:  Social History     Socioeconomic History    Marital status:      Spouse name: Not on file    Number of children: Not on file    Years of education: Not on file    Highest education level: Not on file   Occupational History    Not on file   Tobacco Use    Smoking status: Never    Smokeless tobacco: Never   Substance and Sexual Activity    Alcohol use: No    Drug use: No    Sexual activity: Not on file   Other Topics Concern    Not on file   Social History Narrative    Not on file     Social Determinants of Health     Financial Resource Strain: Not on file   Food Insecurity: Not on file   Transportation Needs: Not on file   Physical Activity: Not on file   Stress: Not on file   Social Connections: Not on file   Intimate Partner Violence: Not on file   Housing Stability: Not on file       Allergies: Allergies   Allergen Reactions    Metformin Diarrhea       Home Meds:  Prior to Admission medications    Medication Sig Start Date End Date Taking? Authorizing Provider   aspirin 81 MG chewable tablet Take 81 mg by mouth in the morning and 81 mg in the evening.    Yes Historical Provider, MD   magnesium oxide (MAG-OX) 400 MG tablet Take 400 mg by mouth nightly   Yes Historical Provider, MD   calcium carbonate 600 MG TABS tablet Take 600 mg by mouth nightly   Yes Historical Provider, MD   Omega-3 Fatty Acids (FISH OIL) 1000 MG capsule Take 1,000 mg by mouth 2 times daily   Yes Historical Provider, MD   metoprolol tartrate (LOPRESSOR) 25 MG tablet Take 25 mg by mouth every morning **SEE OTHER ORDER**   Yes Historical Provider, MD   metoprolol tartrate (LOPRESSOR) 25 MG tablet Take 12.5 mg by mouth nightly **SEE OTHER ORDER**   Yes Historical Provider, MD   lisinopril-hydroCHLOROthiazide (PRINZIDE;ZESTORETIC) 20-12.5 MG per tablet Take 1 tablet by mouth every morning **SEE OTHER ORDER**   Yes Historical Provider, MD   lisinopril-hydroCHLOROthiazide (PRINZIDE;ZESTORETIC) 20-12.5 MG per tablet Take 1 tablet by mouth nightly **SEE OTHER ORDER**   Yes Historical Provider, MD   glipiZIDE (GLUCOTROL) 10 MG tablet Take 10 mg by mouth every morning **SEE OTHER ORDER**   Yes Historical Provider, MD   glipiZIDE (GLUCOTROL) 10 MG tablet Take 5 mg by mouth nightly **SEE OTHER ORDER**   Yes Historical Provider, MD   POTASSIUM PO Take 50 mg by mouth every other day   Yes Historical Provider, MD   nitroGLYCERIN (NITROSTAT) 0.4 MG SL tablet Place 1 tablet under the tongue every 5 minutes as needed for Chest pain up to max of 3 total doses.  If no relief after 1 dose, call 911. 1/21/22   Ghada Carmona, DO   Cholecalciferol (VITAMIN D3) 5000 UNITS TABS Take 5,000 Units by mouth every morning    Historical Provider, MD       Current Medications:  Current Facility-Administered Medications   Medication Dose Route Frequency Provider Last Rate Last Admin    heparin (porcine) injection 4,000 Units  4,000 Units IntraVENous PRN Ana Rosa Marx MD   4,000 Units at 12/06/22 2005    heparin (porcine) injection 2,000 Units  2,000 Units IntraVENous PRN Ana Rosa Marx MD   2,000 Units at 12/07/22 0250    heparin 25,000 units in dextrose 5% 250 mL (premix) infusion  5-30 Units/kg/hr IntraVENous Continuous Ana Rosa Marx MD 12.7 mL/hr at 12/07/22 0250 18 Units/kg/hr at 12/07/22 0250    nitroglycerin (NITRO-BID) 2 % ointment 0.5 inch  0.5 inch Topical 4 times per day Ana Rosa Marx MD   0.5 inch at 12/07/22 0213    metoprolol tartrate (LOPRESSOR) tablet 25 mg  25 mg Oral BID Emmanuel Zheng MD   25 mg at 12/06/22 1959    nitroglycerin (NITRO-BID) 2 % ointment 0.5 inch  0.5 inch Topical 4 times per day Emmanuel Zheng MD        aspirin EC tablet 81 mg  81 mg Oral Daily Emmanuel Zhneg MD        sodium chloride flush 0.9 % injection 10 mL  10 mL IntraVENous 2 times per day Teresa Rodarte MD   10 mL at 12/06/22 2000    sodium chloride flush 0.9 % injection 10 mL  10 mL IntraVENous PRN Celine Gaucher, MD        0.9 % sodium chloride infusion   IntraVENous PRN Celine Gaucher, MD        potassium chloride (KLOR-CON M) extended release tablet 40 mEq  40 mEq Oral PRN Celine Gaucher, MD        Or    potassium bicarb-citric acid (EFFER-K) effervescent tablet 40 mEq  40 mEq Oral PRN Celine Gaucher, MD        Or    potassium chloride 10 mEq/100 mL IVPB (Peripheral Line)  10 mEq IntraVENous PRN Celine Gaucher, MD        ondansetron (ZOFRAN-ODT) disintegrating tablet 4 mg  4 mg Oral Q8H PRN Celine Gaucher, MD        Or    ondansetron [facility]) injection 4 mg  4 mg IntraVENous Q6H PRN Celine Gaucher, MD        senna (SENOKOT) tablet 8.6 mg  1 tablet Oral Daily PRN Celine Gaucher, MD        acetaminophen (TYLENOL) tablet 650 mg  650 mg Oral Q6H PRN Celine Gaucher, MD        Or    acetaminophen (TYLENOL) suppository 650 mg  650 mg Rectal Q6H PRN Celine Gaucher, MD        insulin lispro (HUMALOG) injection vial 0-8 Units  0-8 Units SubCUTAneous TID WC Celine Gaucher, MD        insulin lispro (HUMALOG) injection vial 0-4 Units  0-4 Units SubCUTAneous Nightly Celine Gaucher, MD        glucose chewable tablet 16 g  4 tablet Oral PRN Celine Gaucher, MD        dextrose bolus 10% 125 mL  125 mL IntraVENous PRN Celine Gaucher, MD        Or    dextrose bolus 10% 250 mL  250 mL IntraVENous PRN Celine Gaucher, MD        glucagon (rDNA) injection 1 mg  1 mg SubCUTAneous PRN Celine Gaucher, MD        dextrose 10 % infusion   IntraVENous Continuous PRN Celine Gaucher, MD          heparin (PORCINE) Infusion 18 Units/kg/hr (12/07/22 0250)    sodium chloride      dextrose         Review of Systems:   Constitutional: No fever, chills, sweats  Cardiac: As per HPI  Pulmonary: No cough, wheeze, hemoptysis  HEENT: No visual disturbances or difficult swallowing  GI: No nausea, vomiting, diarrhea, abdominal pain, rectal bleeding  : No dysuria or hematuria  Endocrine: No excessive thirst, heat or cold intolerance. Musculoskeletal: No joint pain or muscle aches. No claudication  Skin: No skin breakdown or rashes  Neuro: No headache, confusion, or seizures  Psych: No depression, anxiety    Physical Exam:  /73   Pulse 78   Temp 98.6 °F (37 °C) (Oral)   Resp 18   Ht 5' 6\" (1.676 m)   Wt 155 lb (70.3 kg)   SpO2 98%   BMI 25.02 kg/m²   Weight change: Wt Readings from Last 3 Encounters:   12/06/22 155 lb (70.3 kg)   01/21/22 156 lb (70.8 kg)   11/10/21 155 lb (70.3 kg)       General: Awake, alert, oriented x3, no acute distress    HEENT: Normocephalic and atraumatic, pupils equal and round. Sclera nonicteric and oral mucosa moist.  Tongue and trachea midline. Neck: No JVD or bruits. No thyroid enlargement or adenopathy    Cardiac: Regular rate and rhythm, normal S1 and S2, no S3. Apical impulse is normal.  No murmurs, rubs or clicks. No carotid bruits and no abdominal bruits. No peripheral edema, cyanosis or clubbing. Normal pulses in the upper and lower extremities bilaterally. Resp: Unlabored respirations at rest.  No wheezes, rales or rhonchi. Abdomen: soft, nontender, nondistended, no gross organomegaly or mass    Skin: Warm and dry, no cyanosis. Musculoskeletal: normal tone and strength in the upper and lower extremities bilaterally    Neuro: Moves all extremities appropriately to command. Normal sensation to light touch in the upper and lower extremities bilaterally    Psych: Cooperative, and normal affect    Intake/Output:  No intake or output data in the 24 hours ending 12/07/22 0652  No intake/output data recorded. Laboratory Tests:  Lab Results   Component Value Date    CREATININE 0.6 12/07/2022    BUN 9 12/07/2022     12/07/2022    K 4.1 12/07/2022     12/07/2022    CO2 20 (L) 12/07/2022     No results for input(s): CKTOTAL, CKMB, TROPONINI in the last 72 hours.   Lab Results   Component Value Date    PROBNP 85 12/06/2022     Lab Results   Component Value Date/Time    WBC 7.2 12/07/2022 02:10 AM    RBC 4.75 12/07/2022 02:10 AM    HGB 12.6 12/07/2022 02:10 AM    HCT 38.2 12/07/2022 02:10 AM    MCV 80.4 12/07/2022 02:10 AM    MCH 26.5 12/07/2022 02:10 AM    MCHC 33.0 12/07/2022 02:10 AM    RDW 13.7 12/07/2022 02:10 AM     12/07/2022 02:10 AM    MPV 10.7 12/07/2022 02:10 AM     Recent Labs     12/06/22  0734 12/07/22  0210   ALKPHOS 81 71   ALT 7 18   AST 17 20   PROT 6.4 6.5   BILITOT 0.3 0.3   LABALBU 3.9 4.1     No results found for: MG  Lab Results   Component Value Date/Time    PROTIME 11.0 12/06/2022 07:34 AM    INR 1.0 12/06/2022 07:34 AM     No results found for: TSH  No components found for: CHLPL  Lab Results   Component Value Date    TRIG 2,539 (H) 12/06/2022    TRIG 572 (H) 11/08/2010    TRIG 1,236 (H) 10/27/2010     Lab Results   Component Value Date    HDL 21 12/06/2022    HDL 30.7 (A) 11/08/2010    HDL 33.6 (A) 10/27/2010     Lab Results   Component Value Date    LDLCALC - (AA) 12/06/2022    [address] - (AA) 11/08/2010    [address] - (AA) 10/27/2010           Active Hospital Problems    Diagnosis     NSTEMI (non-ST elevated myocardial infarction) ([facility] [address] [I21.4]      Priority: Medium    History of right coronary artery stent placement [Z95.5]      Priority: Medium    RBBB (right bundle branch block) [I45.10]      Priority: Medium    Hypertriglyceridemia [E78.1]              ASSESSMENT / PLAN:  Non-STEMI and currently pain-free with hemodynamic stability, no arrhythmias or CHF. Continue aspirin, Nitropaste and IV heparin. Resume metoprolol and ACE inhibitor as at home. Heart catheterization with possible PCI versus CABG recommended. Risks, benefits and alternatives outlined and all questions answered. She agrees to proceed.   Currently stable and will be transferred for cath today    Chronic stable right bundle branch block    Essential hypertension controlled continue therapy as above    Severe hyperlipidemia/hypertriglyceridemia and high-dose atorvastatin started yesterday, will add Vascepa            Electronically signed by Edwin Acosta MD on 12/7/2022 at 6:52 AM

## 2022-12-07 NOTE — PROCEDURES
[address]                  [address] [place],  [facility]                            CARDIAC CATHETERIZATION    PATIENT NAME: Cedric Booker                       :        1960  MED REC NO:   [id number]                            ROOM:  ACCOUNT NO:   [de-identified]                           ADMIT DATE: 2022  PROVIDER:     Bunny Lockwood MD    DATE OF PROCEDURE:  2022    LEFT HEART CATHETERIZATION    REFERRING PROVIDER:  Dr. Breann Lyons. INDICATION:  Non-ST-elevation myocardial infarction. PROCEDURE NOTE:  The patient was transferred from [facility] to  undergo left heart catheterization. After obtaining a signed consent  from the patient, she was brought to the cardiac cath lab in her usual  fasting state. Under sterile condition and under local anesthetic and  using conscious sedation, a 6-Urdu Terumo Slender sheath was inserted  into the right radial artery. The patient's ACT was 165. She received  5000 units of intravenous heparin. She also received 200 mcg of  intra-arterial nitroglycerin and 2.5 mg of diluted verapamil via the  right radial sheath. Then, a 5-Urdu TIG diagnostic catheter was  advanced over a J-tip wire to the ascending aorta without difficulty. The left main coronary artery was engaged and a coronary angiogram was  done. Then, the right coronary artery was engaged and a coronary  angiogram was done. This catheter was exchanged over a guidewire to a  5-Urdu pigtail, which was advanced into the left ventricle without  difficulty. The left ventricular end-diastolic pressure was measured. Left ventriculogram was done. There was no significant gradient across  the aortic valve. At the end of the procedure, the pigtail was pulled  out. The Terumo Slender sheath was pulled out and a Vasc Band was  applied over the right radial artery with good hemostasis.   The patient  tolerated the procedure very well and no complications were noted. No  significant blood loss occurred during the procedure. FINDINGS:  HEMODYNAMICS:  1. Aortic pressure 88/64 mmHg. 2.  Left ventricular end-diastolic pressure 40 mmHg. CORONARY ANATOMY:  1. Left main:  It is a short artery which is medium in size with no  angiographic stenosis noted. 2.  LAD:  It is medium in size and reaching the apex and giving rise to  two small diagonal branches and several small septal perforators. The  LAD was moderately calcified in its proximal to mid segment. There was  a long 70% to 80% proximal to mid stenosis. The LAD is small in size  distally. 3.  Left circumflex: It is a large artery giving rise to a bifurcating  first obtuse marginal branch which has a high takeoff, then to a second  obtuse marginal branch. The upper branch of OM-1 has 70% to 80%  discrete proximal stenosis. The lower branch also has 60% to 70%  discrete proximal stenosis. 4.  RCA:  It is a large dominant artery giving rise to two RV marginal  branches, a PDA and a bifurcating PLV branch. There was 50% tubular  proximal to mid RCA stenosis. There were overlapping stents noted in  the mid to distal RCA with moderate diffuse in-stent restenosis in the  proximal and distal segment and 70% to 80% discrete mid in-stent  restenosis. There was 80% discrete proximal PDA stenosis and 70% to 80%  proximal PLV branch stenosis. Left ventriculogram revealed mid to apical akinesis with an ejection  fraction of 40% to 45%. No mitral regurgitation was noted. IMPRESSION:  1. Severe triple-vessel CAD. 2.  Probable Takotsubo cardiomyopathy with an ejection fraction of 40%  to 45%. RECOMMENDATIONS:  1. Aggressive medical therapy. 2.  Guideline-directed medical therapy. 3.  CT Surgery consult for possible CABG. PROCEDURE START TIME:  8606. PROCEDURE END TIME:  1538. FLUOROSCOPY TIME:  2.1 minutes. CONTRAST VOLUME:  50 mL of Isovue.     CONSCIOUS SEDATION:  1 mg of Versed and 25 mcg of intravenous fentanyl.         Tomas Boateng MD    D: 12/07/2022 15:57:28       T: 12/07/2022 15:59:59     [email or web address]  Job#: [id number]     Doc#: [id number]    CC:

## 2022-12-07 NOTE — PLAN OF CARE
Problem: Chronic Conditions and Co-morbidities  Goal: Patient's chronic conditions and co-morbidity symptoms are monitored and maintained or improved  Outcome: Progressing  Flowsheets (Taken 12/7/2022 1720)  Care Plan - Patient's Chronic Conditions and Co-Morbidity Symptoms are Monitored and Maintained or Improved: Monitor and assess patient's chronic conditions and comorbid symptoms for stability, deterioration, or improvement     Problem: Discharge Planning  Goal: Discharge to home or other facility with appropriate resources  Outcome: Progressing  Flowsheets  Taken 12/7/2022 1720  Discharge to home or other facility with appropriate resources: Identify barriers to discharge with patient and caregiver  Taken 12/7/2022 1718  Discharge to home or other facility with appropriate resources: Identify barriers to discharge with patient and caregiver     Problem: Safety - Adult  Goal: Free from fall injury  Outcome: Progressing

## 2022-12-07 NOTE — PROGRESS NOTES
[facility] to  patient at 1030 to transport to [address] [place] [address] for heart cath.

## 2022-12-07 NOTE — H&P
History and Physical      CHIEF COMPLAINT: Chest pain      HISTORY OF PRESENT ILLNESS:      The patient is a 57 y.o. female patient of Joselito Pryor DO history of CAD status stents in 2017, type 2 diabetes, hypertension, hyperlipidemia who presents with chest pain initially admitted to [facility]. Patient developed chest pain yesterday morning while walking to the bathroom. Pressure type pain associated with shortness of breath. Worsened with exertion improved with rest.  Patient went to [facility] and was diagnosed with concern for NSTEMI. She was transferred to [facility] for left heart catheterization. Left heart catheterization revealed three-vessel disease with apical hypokinesis on LV gram.  Patient is admitted for further evaluation and treatment. Patient notes last chest pain was this morning while walking to the bathroom again. Past Medical History:    Past Medical History:   Diagnosis Date     novel coronavirus disease (COVID-19) 11/10/2021    Coronary artery disease involving native coronary artery of native heart without angina pectoris     Diabetes mellitus ([facility] [address]     Hyperlipidemia     Hypertension        Past Surgical History:    Past Surgical History:   Procedure Laterality Date     SECTION      3  sections    CORONARY ANGIOPLASTY WITH STENT PLACEMENT  04/10/2017    3.0/38 and 3.0/20 Synergy stents to mid and distal RCA by Dr. Fran Kendall         Medications Prior to Admission:    Reviewed see med rec    Allergies:    Metformin    Social History:    reports that she has never smoked. She has never used smokeless tobacco. She reports that she does not drink alcohol and does not use drugs. Family History:   family history includes Dementia in her mother; Diabetes in her maternal grandfather; High Blood Pressure in her father. REVIEW OF SYSTEMS:  As above in the HPI, otherwise negative    PHYSICAL EXAM:    Vitals:   There were no vitals taken for this visit. General:  Awake, alert, oriented X 3. Well developed, well nourished, well groomed. No apparent distress. HEENT:  Normocephalic, atraumatic. Pupils equal, round, reactive to light. No scleral icterus. No conjunctival injection. Normal lips, teeth, and gums. No nasal discharge. Neck:  Supple  Heart:  RRR, no murmurs, gallops, rubs  Lungs:  CTA bilaterally, bilat symmetrical expansion, no wheeze, rales, or rhonchi  Abdomen:   Bowel sounds present, soft, nontender, no masses, no organomegaly, no peritoneal signs  Extremities:  No clubbing, cyanosis, or edema  Skin:  Warm and dry, no open lesions or rash  Neuro:  Cranial nerves 2-12 intact, no focal deficits  Breast: deferred  Rectal: deferred  Genitalia:  deferred    LABS:    CBC with Differential:    Lab Results   Component Value Date/Time    WBC 7.2 12/07/2022 02:10 AM    RBC 4.75 12/07/2022 02:10 AM    HGB 12.6 12/07/2022 02:10 AM    HCT 38.2 12/07/2022 02:10 AM     12/07/2022 02:10 AM    MCV 80.4 12/07/2022 02:10 AM    MCH 26.5 12/07/2022 02:10 AM    MCHC 33.0 12/07/2022 02:10 AM    RDW 13.7 12/07/2022 02:10 AM    LYMPHOPCT 45.5 12/07/2022 02:10 AM    MONOPCT 5.9 12/07/2022 02:10 AM    BASOPCT 0.7 12/07/2022 02:10 AM    MONOSABS 0.42 12/07/2022 02:10 AM    LYMPHSABS 3.26 12/07/2022 02:10 AM    EOSABS 0.22 12/07/2022 02:10 AM    BASOSABS 0.05 12/07/2022 02:10 AM     CMP:    Lab Results   Component Value Date/Time     12/07/2022 02:10 AM    K 4.1 12/07/2022 02:10 AM     12/07/2022 02:10 AM    CO2 20 12/07/2022 02:10 AM    BUN 9 12/07/2022 02:10 AM    CREATININE 0.6 12/07/2022 02:10 AM    GFRAA >60 04/11/2017 04:15 AM    LABGLOM >60 12/07/2022 02:10 AM    GLUCOSE 317 12/07/2022 02:10 AM    GLUCOSE 186 11/08/2010 08:28 AM    PROT 6.5 12/07/2022 02:10 AM    LABALBU 4.1 12/07/2022 02:10 AM    LABALBU 4.8 11/08/2010 08:28 AM    CALCIUM 9.4 12/07/2022 02:10 AM    BILITOT 0.3 12/07/2022 02:10 AM    ALKPHOS 71 12/07/2022 02:10 AM    AST 20 12/07/2022 02:10 AM    ALT 18 12/07/2022 02:10 AM     Magnesium:  No results found for: MG  Phosphorus:  No results found for: PHOS  PT/INR:    Lab Results   Component Value Date/Time    PROTIME 11.0 12/06/2022 07:34 AM    INR 1.0 12/06/2022 07:34 AM     Last 3 Troponin:  No results found for: TROPONINI  U/A:  No results found for: NITRITE, COLORU, PROTEINU, PHUR, LABCAST, WBCUA, RBCUA, MUCUS, TRICHOMONAS, YEAST, BACTERIA, CLARITYU, SPECGRAV, LEUKOCYTESUR, UROBILINOGEN, BILIRUBINUR, BLOODU, GLUCOSEU, AMORPHOUS  ABG:  No results found for: PH, PCO2, PO2, HCO3, BE, THGB, TCO2, O2SAT  HgBA1c:    Lab Results   Component Value Date/Time    LABA1C 12.0 12/07/2022 02:10 AM     FLP:    Lab Results   Component Value Date/Time    TRIG 2,539 12/06/2022 07:34 AM    HDL 21 12/06/2022 07:34 AM    LDLCALC - 12/06/2022 07:34 AM    LABVLDL - 12/06/2022 07:34 AM     TSH:  No results found for: TSH    No orders to display       ASSESSMENT:      Active Problems:    NSTEMI (non-ST elevated myocardial infarction) ([facility] [address]    CAD, multiple vessel    Benign essential hypertension    HLD (hyperlipidemia)    Type 2 diabetes mellitus ([facility] [address]  Resolved Problems:    * No resolved hospital problems. *      PLAN:  Multivessel CAD, NSTEMI, cardiomyopathy-suspect stress induced with apical akinesis on LV gram  Admit to intermediate care  ASA  Statin  GDMT  Echocardiogram  Cardiology consult  CT surgery consult  DM2  uncontrolled Lantus MBS, SSI, A1c 12.3  HTN  Medications Monitor BP and labs. Severe hyperlipidemia continue statin add fibrate total cholesterol 449, triglycerides 2539  . PT/OT  DVT PPx  Dispo: Requires inpatient admission        Electronically signed by Carol Mckinnon MD on 12/7/2022 at 4:52 PM    NOTE: This report was transcribed using voice recognition software. Every effort was made to ensure accuracy; however, inadvertent computerized transcription errors may be present.

## 2022-12-07 NOTE — PROGRESS NOTES
0.5 inch at 12/07/22 9649    metoprolol tartrate (LOPRESSOR) tablet 25 mg  25 mg Oral BID Adrian Bernal MD   25 mg at 12/07/22 1131    nitroglycerin (NITRO-BID) 2 % ointment 0.5 inch  0.5 inch Topical 4 times per day Adrian Bernal MD   0.5 inch at 12/07/22 0803    aspirin EC tablet 81 mg  81 mg Oral Daily Adrian Bernal MD   81 mg at 12/07/22 4010    sodium chloride flush 0.9 % injection 10 mL  10 mL IntraVENous 2 times per day Nicola Mathew MD   10 mL at 12/06/22 2000    sodium chloride flush 0.9 % injection 10 mL  10 mL IntraVENous PRN Nicola Mathew MD        0.9 % sodium chloride infusion   IntraVENous PRN Nicola Mathwe MD        potassium chloride (KLOR-CON M) extended release tablet 40 mEq  40 mEq Oral PRN Nicola Mathew MD        Or    potassium bicarb-citric acid (EFFER-K) effervescent tablet 40 mEq  40 mEq Oral PRN Nicola Mathew MD        Or    potassium chloride 10 mEq/100 mL IVPB (Peripheral Line)  10 mEq IntraVENous PRN Nicola Mathew MD        ondansetron (ZOFRAN-ODT) disintegrating tablet 4 mg  4 mg Oral Q8H PRN Nicola Mathew MD        Or    ondansetron [facility]) injection 4 mg  4 mg IntraVENous Q6H PRWEST Mathew MD        senna (SENOKOT) tablet 8.6 mg  1 tablet Oral Daily PRN Nicola Mathew MD        acetaminophen (TYLENOL) tablet 650 mg  650 mg Oral Q6H PRN Nicola Mathew MD        Or    acetaminophen (TYLENOL) suppository 650 mg  650 mg Rectal Q6H PRN Nicola Mathew MD        insulin lispro (HUMALOG) injection vial 0-8 Units  0-8 Units SubCUTAneous TID WC Nicola Mathew MD        insulin lispro (HUMALOG) injection vial 0-4 Units  0-4 Units SubCUTAneous Nightly Nicola Mathew MD        glucose chewable tablet 16 g  4 tablet Oral PRN Nicola Mathew MD        dextrose bolus 10% 125 mL  125 mL IntraVENous PRN Nicola Mathew MD        Or    dextrose bolus 10% 250 mL  250 mL IntraVENous PRN Nicola Mathew MD        glucagon (rDNA) injection 1 mg  1 mg SubCUTAneous TERRY Mathew MD dextrose 10 % infusion   IntraVENous Continuous PRN Brielle Reveles MD           PRN Medications  heparin (porcine), heparin (porcine), sodium chloride flush, sodium chloride, potassium chloride **OR** potassium alternative oral replacement **OR** potassium chloride, ondansetron **OR** ondansetron, senna, acetaminophen **OR** acetaminophen, glucose, dextrose bolus **OR** dextrose bolus, glucagon (rDNA), dextrose    Objective  Most Recent Recorded Vitals  /82   Pulse 78   Temp 97.2 °F (36.2 °C) (Oral)   Resp 18   Ht 5' 6\" (1.676 m)   Wt 155 lb (70.3 kg)   SpO2 99%   BMI 25.02 kg/m²   No intake/output data recorded. No intake/output data recorded. Physical Exam:  General: AAO to person/place/time/purpose, NAD, no labored breathing, mood appropriate  Eyes: conjunctivae/corneas clear, sclera non icteric  Skin: color/texture/turgor normal, no rashes or lesions  Lungs: CTAB, no retractions/use of accessory muscles, no vocal fremitus, no rhonchi, no crackle, no rales  Heart: regular rate, regular rhythm, no murmur  Abdomen: soft, NT, bowel sounds normal, nondistended  Extremities: atraumatic, no edema  Neurologic: cranial nerves 2-12 grossly intact, no slurred speech    Most Recent Labs  Lab Results   Component Value Date    WBC 7.2 12/07/2022    HGB 12.6 12/07/2022    HCT 38.2 12/07/2022     12/07/2022     12/07/2022    K 4.1 12/07/2022     12/07/2022    CREATININE 0.6 12/07/2022    BUN 9 12/07/2022    CO2 20 (L) 12/07/2022    GLUCOSE 317 (H) 12/07/2022    ALT 18 12/07/2022    AST 20 12/07/2022    INR 1.0 12/06/2022    LABA1C 12.0 (H) 12/07/2022       XR CHEST PORTABLE   Final Result   No acute process.                Assessment   Active Hospital Problems    Diagnosis     NSTEMI (non-ST elevated myocardial infarction) ([facility] [place] [address] [I21.4]      Priority: Medium    History of right coronary artery stent placement [Z95.5]      Priority: Medium    RBBB (right bundle branch block) [I45.10] Priority: Medium    Hypertriglyceridemia [E78.1]          Plan  NSTEMI  Troponins noted  Cardiology consultation   Start heparin drip 12/6  Discussed with Dr Juanita Humphrey   Cath today  Further intervention pending PCI and cardiology recs     Severe hypertriglyceridemia and hyperlipidemia   Statin, vascepa added     Continue home medications as ordered   PT/OT  Follow labs  DVT prophylaxis. Please see orders for further management and care.  for discharge planning  Medications, labs and imaging reviewed by me personally  Discharge plan: Transfer to [facility] for cath today    Electronically signed by Rene Lovelace DO on 12/7/2022 at 11:23 AM    I can be reached through [facility].

## 2022-12-07 NOTE — CARE COORDINATION
Social Work / Discharge Planning : Patient admitted with Non-stemi. Currently;y at cath lab. Patient independent from HOME with family. Patient has a PCP but no insurance. Will have [facility] Screening. Last admit in 2017, patient also had a screening by [facility]. AWait plan. SW to follow.  Electronically signed by SOUMYA Driscoll on 12/7/22 at 7:58 AM EST

## 2022-12-07 NOTE — PROGRESS NOTES
Vascband weaned and removed. 2x2 gauze and opsite applied. Site soft stable, no bleeding or hematoma. Instructed patient on wrist limitations.  2+ pulse

## 2022-12-07 NOTE — CARE COORDINATION
CASE MANAGEMENT. ... NSTEMI. On iv heparin gtt. For cc today at [facility]. Transport forms on chart.

## 2022-12-08 LAB
ANION GAP SERPL CALCULATED.3IONS-SCNC: 14 MMOL/L (ref 7–16)
BUN BLDV-MCNC: 9 MG/DL (ref 6–23)
CALCIUM SERPL-MCNC: 9.1 MG/DL (ref 8.6–10.2)
CHLORIDE BLD-SCNC: 100 MMOL/L (ref 98–107)
CO2: 20 MMOL/L (ref 22–29)
CREAT SERPL-MCNC: 0.5 MG/DL (ref 0.5–1)
EKG ATRIAL RATE: 78 BPM
EKG P AXIS: 53 DEGREES
EKG P-R INTERVAL: 180 MS
EKG Q-T INTERVAL: 456 MS
EKG QRS DURATION: 128 MS
EKG QTC CALCULATION (BAZETT): 519 MS
EKG R AXIS: -74 DEGREES
EKG T AXIS: -109 DEGREES
EKG VENTRICULAR RATE: 78 BPM
GFR SERPL CREATININE-BSD FRML MDRD: >60 ML/MIN/1.73
GLUCOSE BLD-MCNC: 255 MG/DL (ref 74–99)
HCT VFR BLD CALC: 38.4 % (ref 34–48)
HEMOGLOBIN: 12.2 G/DL (ref 11.5–15.5)
MAGNESIUM: 2 MG/DL (ref 1.6–2.6)
MCH RBC QN AUTO: 25.8 PG (ref 26–35)
MCHC RBC AUTO-ENTMCNC: 31.8 % (ref 32–34.5)
MCV RBC AUTO: 81.4 FL (ref 80–99.9)
METER GLUCOSE: 236 MG/DL (ref 74–99)
METER GLUCOSE: 254 MG/DL (ref 74–99)
METER GLUCOSE: 264 MG/DL (ref 74–99)
PDW BLD-RTO: 13.8 FL (ref 11.5–15)
PLATELET # BLD: 226 E9/L (ref 130–450)
PMV BLD AUTO: 10.9 FL (ref 7–12)
POTASSIUM SERPL-SCNC: 3.9 MMOL/L (ref 3.5–5)
RBC # BLD: 4.72 E12/L (ref 3.5–5.5)
SODIUM BLD-SCNC: 134 MMOL/L (ref 132–146)
WBC # BLD: 4.8 E9/L (ref 4.5–11.5)

## 2022-12-08 PROCEDURE — 80048 BASIC METABOLIC PNL TOTAL CA: CPT

## 2022-12-08 PROCEDURE — 82962 GLUCOSE BLOOD TEST: CPT

## 2022-12-08 PROCEDURE — 85027 COMPLETE CBC AUTOMATED: CPT

## 2022-12-08 PROCEDURE — 2580000003 HC RX 258: Performed by: INTERNAL MEDICINE

## 2022-12-08 PROCEDURE — 6370000000 HC RX 637 (ALT 250 FOR IP): Performed by: STUDENT IN AN ORGANIZED HEALTH CARE EDUCATION/TRAINING PROGRAM

## 2022-12-08 PROCEDURE — G0378 HOSPITAL OBSERVATION PER HR: HCPCS

## 2022-12-08 PROCEDURE — 83036 HEMOGLOBIN GLYCOSYLATED A1C: CPT

## 2022-12-08 PROCEDURE — 93005 ELECTROCARDIOGRAM TRACING: CPT | Performed by: STUDENT IN AN ORGANIZED HEALTH CARE EDUCATION/TRAINING PROGRAM

## 2022-12-08 PROCEDURE — 93010 ELECTROCARDIOGRAM REPORT: CPT | Performed by: INTERNAL MEDICINE

## 2022-12-08 PROCEDURE — 83735 ASSAY OF MAGNESIUM: CPT

## 2022-12-08 PROCEDURE — 36415 COLL VENOUS BLD VENIPUNCTURE: CPT

## 2022-12-08 RX ORDER — NITROGLYCERIN 0.4 MG/1
0.4 TABLET SUBLINGUAL EVERY 5 MIN PRN
Status: DISCONTINUED | OUTPATIENT
Start: 2022-12-08 | End: 2022-12-12

## 2022-12-08 RX ORDER — HYDROCHLOROTHIAZIDE 12.5 MG/1
12.5 TABLET ORAL DAILY
Status: DISCONTINUED | OUTPATIENT
Start: 2022-12-08 | End: 2022-12-10

## 2022-12-08 RX ORDER — DEXTROSE MONOHYDRATE 100 MG/ML
INJECTION, SOLUTION INTRAVENOUS CONTINUOUS PRN
Status: DISCONTINUED | OUTPATIENT
Start: 2022-12-08 | End: 2022-12-12

## 2022-12-08 RX ORDER — CALCIUM CARBONATE 500(1250)
500 TABLET ORAL NIGHTLY
Status: DISCONTINUED | OUTPATIENT
Start: 2022-12-08 | End: 2022-12-12

## 2022-12-08 RX ORDER — LANOLIN ALCOHOL/MO/W.PET/CERES
400 CREAM (GRAM) TOPICAL NIGHTLY
Status: DISCONTINUED | OUTPATIENT
Start: 2022-12-08 | End: 2022-12-12

## 2022-12-08 RX ORDER — LISINOPRIL 20 MG/1
20 TABLET ORAL DAILY
Status: DISCONTINUED | OUTPATIENT
Start: 2022-12-08 | End: 2022-12-09

## 2022-12-08 RX ORDER — ASPIRIN 81 MG/1
81 TABLET, CHEWABLE ORAL 2 TIMES DAILY
Status: DISCONTINUED | OUTPATIENT
Start: 2022-12-08 | End: 2022-12-09

## 2022-12-08 RX ORDER — LISINOPRIL AND HYDROCHLOROTHIAZIDE 20; 12.5 MG/1; MG/1
1 TABLET ORAL EVERY MORNING
Status: DISCONTINUED | OUTPATIENT
Start: 2022-12-08 | End: 2022-12-08 | Stop reason: CLARIF

## 2022-12-08 RX ORDER — INSULIN LISPRO 100 [IU]/ML
0-8 INJECTION, SOLUTION INTRAVENOUS; SUBCUTANEOUS
Status: DISCONTINUED | OUTPATIENT
Start: 2022-12-08 | End: 2022-12-10

## 2022-12-08 RX ORDER — FENOFIBRATE 54 MG/1
54 TABLET ORAL DAILY
Status: DISCONTINUED | OUTPATIENT
Start: 2022-12-08 | End: 2022-12-09

## 2022-12-08 RX ORDER — INSULIN LISPRO 100 [IU]/ML
0-4 INJECTION, SOLUTION INTRAVENOUS; SUBCUTANEOUS NIGHTLY
Status: DISCONTINUED | OUTPATIENT
Start: 2022-12-08 | End: 2022-12-10

## 2022-12-08 RX ADMIN — ASPIRIN 81 MG CHEWABLE TABLET 81 MG: 81 TABLET CHEWABLE at 15:29

## 2022-12-08 RX ADMIN — Medication 5000 UNITS: at 15:29

## 2022-12-08 RX ADMIN — METOPROLOL TARTRATE 25 MG: 25 TABLET, FILM COATED ORAL at 12:25

## 2022-12-08 RX ADMIN — SODIUM CHLORIDE, PRESERVATIVE FREE 10 ML: 5 INJECTION INTRAVENOUS at 09:02

## 2022-12-08 RX ADMIN — MAGNESIUM GLUCONATE 500 MG ORAL TABLET 400 MG: 500 TABLET ORAL at 21:40

## 2022-12-08 RX ADMIN — LISINOPRIL 20 MG: 20 TABLET ORAL at 12:25

## 2022-12-08 RX ADMIN — SODIUM CHLORIDE, PRESERVATIVE FREE 10 ML: 5 INJECTION INTRAVENOUS at 21:41

## 2022-12-08 RX ADMIN — METOPROLOL TARTRATE 12.5 MG: 25 TABLET, FILM COATED ORAL at 21:29

## 2022-12-08 RX ADMIN — HYDROCHLOROTHIAZIDE 12.5 MG: 12.5 TABLET ORAL at 12:25

## 2022-12-08 RX ADMIN — CALCIUM 500 MG: 500 TABLET ORAL at 21:40

## 2022-12-08 ASSESSMENT — PAIN SCALES - GENERAL
PAINLEVEL_OUTOF10: 0

## 2022-12-08 NOTE — CARE COORDINATION
Transition of care: Cardiac cath - severe triple vessel CAD on 12/07. Met with pt and pt's , Heladio Turcios, in room. Pt lives with Heladio Turcios in a 2 story home. Has 3 stairs with no handrail to enter home. Her bedroom is on the 2nd level. Independent with ADLs and drives. DME- glucometer and bp cuff/monitor. Plan is to return home at discharge. Public Benefits to meet with pt. Pt said she has [employer] as her coverage. Pt's  said they do not have prescription drug coverage. Provided them with info on [address] [place] [address]. Informed them about [facility] Tr[facility]. PCP is Dr. Chu Armstrong and pharmacy is [facility]. Sw/cm will follow.

## 2022-12-08 NOTE — PROGRESS NOTES
Hospitalist Progress Note      PCP: Dalton Espinoza DO    Date of Admission: 12/7/2022    Chief Complaint: Chest pain, transfer for left heart catheterization, multivessel CAD    Hospital Course: The patient is a 57 y.o. female patient of Dalton Espinoza DO history of CAD status stents in 2017, type 2 diabetes, hypertension, hyperlipidemia who presents with chest pain initially admitted to [facility]. Patient developed chest pain yesterday morning while walking to the bathroom. Pressure type pain associated with shortness of breath. Worsened with exertion improved with rest.  Patient went to [facility] and was diagnosed with concern for NSTEMI. She was transferred to [facility] for left heart catheterization. Cardiology consulted-left heart catheterization revealed three-vessel disease with apical hypokinesis on LV gram.  Patient is admitted for further evaluation and treatment. Cardiothoracic surgery was consulted due to the multivessel CAD. Subjective: Patient is seen at bedside this morning, does not complain of any chest pain, shortness of breath at this time. Discussed with the plan regarding cardiothoracic evaluation for multivessel coronary artery disease. All questions were answered at this time. Had a detailed discussion with patient and [id number] are currently self-pay and would like to hold off on any procedure until 3 weeks if possible and continue medical management for now in the meantime. cardiothoracic surgery and cardiology on board. .    Medications:  Reviewed    Infusion Medications    sodium chloride       Scheduled Medications    fenofibrate  54 mg Oral Daily    sodium chloride flush  5-40 mL IntraVENous 2 times per day     PRN Meds: sodium chloride flush, sodium chloride, acetaminophen      Intake/Output Summary (Last 24 hours) at 12/8/2022 1031  Last data filed at 12/7/2022 2330  Gross per 24 hour   Intake 447.7 ml   Output 500 ml   Net -52.3 ml Exam:    /87   Pulse 73   Temp 97.9 °F (36.6 °C) (Oral)   Resp 17   Ht 5' 6\" (1.676 m)   Wt 161 lb 1.6 oz (73.1 kg)   SpO2 98%   BMI 26.00 kg/m²     General appearance: No apparent distress, appears stated age and cooperative. HEENT: Pupils equal, round, and reactive to light. Conjunctivae/corneas clear. Neck: Supple, with full range of motion. No jugular venous distention. Trachea midline. Respiratory:  Normal respiratory effort. Clear to auscultation, bilaterally without Rales/Wheezes/Rhonchi. Cardiovascular: Regular rate and rhythm with normal S1/S2 without murmurs, rubs or gallops. Abdomen: Soft, non-tender, non-distended with normal bowel sounds. Musculoskeletal: No clubbing, cyanosis or edema bilaterally. Full range of motion without deformity. Skin: Skin color, texture, turgor normal.  No rashes or lesions. Neurologic:  Neurovascularly intact without any focal sensory/motor deficits. Cranial nerves: II-XII intact, grossly non-focal.  Psychiatric: Alert and oriented, thought content appropriate, normal insight    Labs:   Recent Labs     12/06/22  0734 12/07/22  0210 12/08/22  0803   WBC 5.6 7.2 4.8   HGB 13.1 12.6 12.2   HCT 39.2 38.2 38.4    273 226     Recent Labs     12/06/22  0734 12/07/22  0210 12/08/22  0803   * 134 134   K 4.3 4.1 3.9   CL 93* 100 100   CO2 23 20* 20*   BUN 13 9 9   CREATININE 0.5 0.6 0.5   CALCIUM 9.8 9.4 9.1     Recent Labs     12/06/22  0734 12/07/22  0210   AST 17 20   ALT 7 18   BILITOT 0.3 0.3   ALKPHOS 81 71     Recent Labs     12/06/22  0734   INR 1.0     No results for input(s): Elizabeth Flaherty in the last 72 hours.     Assessment/Plan:    Active Hospital Problems    Diagnosis Date Noted    CAD, multiple vessel [I25.10] 12/07/2022     Priority: Medium    NSTEMI (non-ST elevated myocardial infarction) ([facility] [address] [I21.4] 12/06/2022     Priority: Medium    [facility] [E11.9] 06/03/2011    Benign essential hypertension [I10] 06/03/2011    HLD (hyperlipidemia) [E78.5] 06/03/2011   Multivessel CAD  Hypertriglyceridemia, hypercholesterolemia  Hypertension  Diabetes mellitus type 2-uncontrolled    Continue on telemetry  -Continue aspirin, statin echocardiogram ordered  -Cardiology consulted-status post heart cath-multivessel CAD  -CT surgery consulted  -Recent A1c reviewed-12.0.  Glipizide held due to risk for hypoglycemia. Sliding scale insulin, hypoglycemia protocol added. Would benefit from insulin at discharge due to A1c.   Will monitor sugars at this time.  -Continue home medications for hypertension    DVT Prophylaxis: Per cardiology  Diet: Diet NPO  Code Status: Prior    Dispo -ongoing specialist kae Roland MD

## 2022-12-08 NOTE — PROGRESS NOTES
RN messaged Cardiology regarding possible need for cardiothoracic surgery consult. RN awaiting any new orders.

## 2022-12-09 ENCOUNTER — APPOINTMENT (OUTPATIENT)
Dept: ULTRASOUND IMAGING | Age: 62
DRG: 233 | End: 2022-12-09
Attending: INTERNAL MEDICINE

## 2022-12-09 ENCOUNTER — APPOINTMENT (OUTPATIENT)
Dept: CT IMAGING | Age: 62
DRG: 233 | End: 2022-12-09
Attending: INTERNAL MEDICINE

## 2022-12-09 ENCOUNTER — APPOINTMENT (OUTPATIENT)
Dept: INTERVENTIONAL RADIOLOGY/VASCULAR | Age: 62
DRG: 233 | End: 2022-12-09
Attending: INTERNAL MEDICINE

## 2022-12-09 ENCOUNTER — ANESTHESIA EVENT (OUTPATIENT)
Dept: OPERATING ROOM | Age: 62
DRG: 233 | End: 2022-12-09

## 2022-12-09 PROBLEM — I25.10 CAD IN NATIVE ARTERY: Status: ACTIVE | Noted: 2022-12-09

## 2022-12-09 LAB
BILIRUBIN URINE: NEGATIVE
BLOOD, URINE: NEGATIVE
CLARITY: CLEAR
COLOR: YELLOW
GLUCOSE URINE: >=1000 MG/DL
HBA1C MFR BLD: 12.1 % (ref 4–5.6)
KETONES, URINE: 15 MG/DL
LEUKOCYTE ESTERASE, URINE: NEGATIVE
METER GLUCOSE: 253 MG/DL (ref 74–99)
METER GLUCOSE: 264 MG/DL (ref 74–99)
METER GLUCOSE: 281 MG/DL (ref 74–99)
METER GLUCOSE: 284 MG/DL (ref 74–99)
NITRITE, URINE: NEGATIVE
PH UA: 5.5 (ref 5–9)
PROTEIN UA: NEGATIVE MG/DL
SPECIFIC GRAVITY UA: 1.01 (ref 1–1.03)
UROBILINOGEN, URINE: 0.2 E.U./DL

## 2022-12-09 PROCEDURE — 81003 URINALYSIS AUTO W/O SCOPE: CPT

## 2022-12-09 PROCEDURE — 87081 CULTURE SCREEN ONLY: CPT

## 2022-12-09 PROCEDURE — 94375 RESPIRATORY FLOW VOLUME LOOP: CPT

## 2022-12-09 PROCEDURE — 82962 GLUCOSE BLOOD TEST: CPT

## 2022-12-09 PROCEDURE — 2580000003 HC RX 258: Performed by: INTERNAL MEDICINE

## 2022-12-09 PROCEDURE — 94729 DIFFUSING CAPACITY: CPT

## 2022-12-09 PROCEDURE — 93306 TTE W/DOPPLER COMPLETE: CPT

## 2022-12-09 PROCEDURE — 6370000000 HC RX 637 (ALT 250 FOR IP): Performed by: INTERNAL MEDICINE

## 2022-12-09 PROCEDURE — 93970 EXTREMITY STUDY: CPT

## 2022-12-09 PROCEDURE — 87088 URINE BACTERIA CULTURE: CPT

## 2022-12-09 PROCEDURE — 93971 EXTREMITY STUDY: CPT | Performed by: RADIOLOGY

## 2022-12-09 PROCEDURE — 93880 EXTRACRANIAL BILAT STUDY: CPT | Performed by: RADIOLOGY

## 2022-12-09 PROCEDURE — 93922 UPR/L XTREMITY ART 2 LEVELS: CPT

## 2022-12-09 PROCEDURE — 2140000000 HC CCU INTERMEDIATE R&B

## 2022-12-09 PROCEDURE — 93880 EXTRACRANIAL BILAT STUDY: CPT

## 2022-12-09 PROCEDURE — 6370000000 HC RX 637 (ALT 250 FOR IP): Performed by: STUDENT IN AN ORGANIZED HEALTH CARE EDUCATION/TRAINING PROGRAM

## 2022-12-09 PROCEDURE — 71250 CT THORAX DX C-: CPT

## 2022-12-09 RX ORDER — ASPIRIN 81 MG/1
81 TABLET, CHEWABLE ORAL DAILY
Status: DISCONTINUED | OUTPATIENT
Start: 2022-12-09 | End: 2022-12-12 | Stop reason: SDUPTHER

## 2022-12-09 RX ORDER — ATORVASTATIN CALCIUM 80 MG/1
80 TABLET, FILM COATED ORAL NIGHTLY
Status: DISCONTINUED | OUTPATIENT
Start: 2022-12-09 | End: 2022-12-15 | Stop reason: HOSPADM

## 2022-12-09 RX ORDER — METOPROLOL SUCCINATE 25 MG/1
25 TABLET, EXTENDED RELEASE ORAL 2 TIMES DAILY
Status: COMPLETED | OUTPATIENT
Start: 2022-12-09 | End: 2022-12-11

## 2022-12-09 RX ADMIN — MAGNESIUM GLUCONATE 500 MG ORAL TABLET 400 MG: 500 TABLET ORAL at 21:21

## 2022-12-09 RX ADMIN — CALCIUM 500 MG: 500 TABLET ORAL at 21:20

## 2022-12-09 RX ADMIN — ASPIRIN 81 MG CHEWABLE TABLET 81 MG: 81 TABLET CHEWABLE at 10:21

## 2022-12-09 RX ADMIN — METOPROLOL SUCCINATE 25 MG: 25 TABLET, EXTENDED RELEASE ORAL at 10:21

## 2022-12-09 RX ADMIN — ATORVASTATIN CALCIUM 80 MG: 80 TABLET, FILM COATED ORAL at 21:20

## 2022-12-09 RX ADMIN — SODIUM CHLORIDE, PRESERVATIVE FREE 10 ML: 5 INJECTION INTRAVENOUS at 21:21

## 2022-12-09 RX ADMIN — HYDROCHLOROTHIAZIDE 12.5 MG: 12.5 TABLET ORAL at 10:21

## 2022-12-09 RX ADMIN — Medication 5000 UNITS: at 10:23

## 2022-12-09 RX ADMIN — METOPROLOL SUCCINATE 25 MG: 25 TABLET, EXTENDED RELEASE ORAL at 21:20

## 2022-12-09 RX ADMIN — SODIUM CHLORIDE, PRESERVATIVE FREE 10 ML: 5 INJECTION INTRAVENOUS at 10:22

## 2022-12-09 ASSESSMENT — PAIN SCALES - GENERAL: PAINLEVEL_OUTOF10: 0

## 2022-12-09 NOTE — CONSULTS
CTS Consult    Patient name: Vicente Almodovar    Reason for consult:  MVCAD     Primary Care Physician: Ham Mae MD    Date of service: 12/9/2022    Chief Complaint:  Chest pain     HPI:  58 yo female with a hx of CAD s/p PCI with stent placement in 2017, type 2 diabetes, hypertension, hyperlipidemia who presents with chest pain initially admitted to [facility].  She states that she awoke at night on Tuesday and noted chest pressure that she had not experienced before. When this persisted, she presented to the ED. She was noted to have an NSTEMI. Continued workup included LHC which revealed severe MVCAD. She is currently chest pain free. Allergies:    Allergies   Allergen Reactions    Metformin Diarrhea       Home medications:    Current Facility-Administered Medications   Medication Dose Route Frequency Provider Last Rate Last Admin    atorvastatin (LIPITOR) tablet 80 mg  80 mg Oral Nightly Ham Mae MD        metoprolol succinate (TOPROL XL) extended release tablet 25 mg  25 mg Oral BID Ham Mae MD   25 mg at 12/09/22 1021    perflutren lipid microspheres (DEFINITY) injection 1.5 mL  1.5 mL IntraVENous ONCE PRN Ham Mae MD        aspirin chewable tablet 81 mg  81 mg Oral Daily Ham Mae MD   81 mg at 12/09/22 1021    vitamin D3 (CHOLECALCIFEROL) tablet 5,000 Units  5,000 Units Oral QAM Bi Sargent MD   5,000 Units at 12/09/22 1023    nitroGLYCERIN (NITROSTAT) SL tablet 0.4 mg  0.4 mg SubLINGual Q5 Min PRN Bi Sargent MD        magnesium oxide (MAG-OX) tablet 400 mg  400 mg Oral Nightly Bi Sargent MD   400 mg at 12/08/22 2140    calcium elemental (OSCAL) tablet 500 mg  500 mg Oral Nightly Bi Sargent MD   500 mg at 12/08/22 2140    insulin lispro (HUMALOG) injection vial 0-8 Units  0-8 Units SubCUTAneous TID  Bi Sargent MD        insulin lispro (HUMALOG) injection vial 0-4 Units  0-4 Units SubCUTAneous Nightly Bi Sargent Social History Narrative    Not on file     Social Determinants of Health     Financial Resource Strain: Not on file   Food Insecurity: Not on file   Transportation Needs: Not on file   Physical Activity: Not on file   Stress: Not on file   Social Connections: Not on file   Intimate Partner Violence: Not on file   Housing Stability: Not on file       Family History:  Family History   Problem Relation Age of Onset    Diabetes Maternal Grandfather     Dementia Mother     High Blood Pressure Father        Review of Systems:  Constitutional: Denies fevers, chills, or weight loss. HEENT: Denies visual changes or hearing loss. Heart: As per HPI. Lungs: Denies shortness of breath, cough, or wheezing. Gastrointestinal: Denies nausea, vomiting, constipation, or diarrhea. Genitourinary: Denies dysuria or hematuria. Psychiatric: Patient denies anxiety or depression. Neurologic: Patient denies weakness of the extremities, dizziness, or headaches. All other ROS checked and found to be negative. Labs:  Recent Labs     12/07/22  0210 12/08/22  0803   WBC 7.2 4.8   HGB 12.6 12.2   HCT 38.2 38.4    226      Recent Labs     12/07/22  0210 12/08/22  0803   BUN 9 9   CREATININE 0.6 0.5       Objective:  Vitals /77   Pulse 80   Temp 98.1 °F (36.7 °C) (Temporal)   Resp 14   Ht 5' 6\" (1.676 m)   Wt 153 lb 6.4 oz (69.6 kg)   SpO2 98%   BMI 24.76 kg/m²   General Appearance: Pleasant 57y.o. year old female who appears stated age. Communicates well, no acute distress. HEENT: Head is normocephalic, atraumatic. EOMs intact, PERRL. Trachea midline. Lungs: Normal respiratory rate and normal effort. She is not in respiratory distress. Breath sounds clear to auscultation. No wheezes. Heart: Normal rate. Regular rhythm. S1 normal and S2 normal. Positive for murmur. Chest: Symmetric chest wall expansion. Extremities: Normal range of motion.      Neurological: Patient is alert and oriented to person, place and time. Patient has normal reflexes. Skin: Warm and dry. Abdomen: Abdomen is soft and non-distended. Bowel sounds are normal. There is no abdominal tenderness tenderness. There is no guarding. There is no mass. Pulses: Distal pulses are intact. Skin: Warm and dry without lesions. CORONARY ANATOMY:  1. Left main:  It is a short artery which is medium in size with no  angiographic stenosis noted. 2.  LAD:  It is medium in size and reaching the apex and giving rise to  two small diagonal branches and several small septal perforators. The  LAD was moderately calcified in its proximal to mid segment. There was  a long 70% to 80% proximal to mid stenosis. The LAD is small in size  distally. 3.  Left circumflex: It is a large artery giving rise to a bifurcating  first obtuse marginal branch which has a high takeoff, then to a second  obtuse marginal branch. The upper branch of OM-1 has 70% to 80%  discrete proximal stenosis. The lower branch also has 60% to 70%  discrete proximal stenosis. 4.  RCA:  It is a large dominant artery giving rise to two RV marginal  branches, a PDA and a bifurcating PLV branch. There was 50% tubular  proximal to mid RCA stenosis. There were overlapping stents noted in  the mid to distal RCA with moderate diffuse in-stent restenosis in the  proximal and distal segment and 70% to 80% discrete mid in-stent  restenosis. There was 80% discrete proximal PDA stenosis and 70% to 80%  proximal PLV branch stenosis. Left ventriculogram revealed mid to apical akinesis with an ejection  fraction of 40% to 45%. No mitral regurgitation was noted. IMPRESSION:  1. Severe triple-vessel CAD. 2.  Probable Takotsubo cardiomyopathy with an ejection fraction of 40%  to 45%. RECOMMENDATIONS:  1. Aggressive medical therapy. 2.  Guideline-directed medical therapy. 3.  CT Surgery consult for possible CABG.     Assessment/Plan  56 yo female admitted with NSTEMI found to have severe MVCAD. Surgery was recommended. All risks, benefits, alternatives and potential complications explained thoroughly including, but not limited to, bleeding, infection, lung injury, kidney injury, stroke, heart attack, prolonged ventilation, wound complication, need for re-operation, and death. She would like to wait until after the 1st of the year because of insurance reasons. I explained to her that the official recommendation and safest course of action would be inpatient CABG. She will talk it over with her  and decide. In the meantime, we will begin our preop workup.        Electronically signed by Christine Baker DO on 12/9/2022 at 10:25 AM

## 2022-12-09 NOTE — PROGRESS NOTES
DAILY PROGRESS NOTE -[facility] CARDIOLOGY    SUBJECTIVE:    Being followed for chest discomfort, non-STEMI, surgical CAD with ejection fraction 40-45% and apical akinesis at the time of heart catheterization. Extremely nervous about CABG and asking if lifestyle and diet modification will suffice without CABG. No chest pain, worsening dyspnea, palpitations, syncope, presyncope. Hypertension, severe hypertriglyceridemia, diabetes, CAD post inferior infarct and RCA drug-eluting stent 2017. Chronic RBBB. OBJECTIVE:    Her vital signs were reviewed today. Vitals:    12/09/22 0241   BP: 110/73   Pulse: 69   Resp: 18   Temp: 98.1 °F (36.7 °C)   SpO2: 98%       Scheduled Meds:   aspirin  81 mg Oral BID    vitamin D3  5,000 Units Oral QAM    magnesium oxide  400 mg Oral Nightly    calcium elemental  500 mg Oral Nightly    metoprolol tartrate  25 mg Oral QAM    metoprolol tartrate  12.5 mg Oral Nightly    fenofibrate  54 mg Oral Daily    insulin lispro  0-8 Units SubCUTAneous TID WC    insulin lispro  0-4 Units SubCUTAneous Nightly    lisinopril  20 mg Oral Daily    And    hydroCHLOROthiazide  12.5 mg Oral Daily    sodium chloride flush  5-40 mL IntraVENous 2 times per day     Continuous Infusions:   dextrose      sodium chloride       PRN Meds:.nitroGLYCERIN, glucose, dextrose bolus **OR** dextrose bolus, glucagon (rDNA), dextrose, sodium chloride flush, sodium chloride, acetaminophen    REVIEW OF SYSTEMS:     No pruritus, rash, bruising. Cardiac and pulmonary symptoms per HPI. No nausea, vomiting, abdominal pain, GI bleeding, change in bowel habits. No dysuria, urinary frequency, urgency, hematuria, flank pain. Joint pain but no muscle soreness, stiffness, aching. No headache, speech disturbance, lateralizing neurologic deficit. No hemoptysis, epistaxis, easy bruising. Persistent anxiety, but no depression. No symptoms of hypothyroidism, hyperthyroidism, diabetes, heat or cold intolerance.     FAMILY HISTORY: Negative for CAD in first-degree relatives. SOCIAL HISTORY: Negative for alcohol, tobacco, or illicit drug use. PHYSICAL EXAM:    General Appearance:  awake, alert, oriented, in no acute distress  Neck:  no bruits  Lungs:  Normal expansion. Clear to auscultation. No rales, rhonchi, or wheezing. Heart:  Heart sounds are normal.  Regular rate and rhythm without murmur, gallop or rub. Abdomen:  Soft, non-tender. Extremities: Extremities warm to touch, pink, with no edema. Neuro/musculosketal:  Unremarkable. LABS:    Recent Labs     12/06/22  0734 12/07/22  0210 12/08/22  0803   * 134 134   CREATININE 0.5 0.6 0.5       Recent Labs     12/06/22  0734 12/07/22  0210 12/08/22  0803   HGB 13.1 12.6 12.2       Recent Labs     12/06/22  0734   INR 1.0         IMPRESSION:    1.  CAD post non-STEMI with surgical disease-CT surgery consult although I am unclear on the timing of this procedure. She prefers to wait until after the first of the year if possible. Would like to discuss with CT as to the appropriateness of this desire. Continue aspirin. 2.  Ischemic cardiomyopathy-echocardiogram while admitted. Stop lisinopril HCT for 2 days and start Entresto 24/26 mg twice daily and change Lopressor to metoprolol succinate 25 mg twice daily. 3.  Severe hyperlipidemia-triglycerides 1200 and LDL unable to be calculated. Stop the fenofibrate and start oral atorvastatin 80 mg nightly.   Consider Zetia to achieve LDL goal below 70 and preferably closer to 50.    4.  Diabetes-per hospitalist team.

## 2022-12-09 NOTE — PLAN OF CARE
Spoke with her  today in the room. After extensive discussion with him, he informed me that they both have decided to go through with CABG this admission. Potentially Monday or Tuesday. Echocardiogram results in the chart via reading earlier today.   Dr. Nilesh Goldstein

## 2022-12-09 NOTE — PROGRESS NOTES
Dr. Garwin Severs notified of consult for CTS  referral for this patient post cath.   He reviewed patient history and will have Dr. Tricia Blake see patient in am. Robbin Bustamante RN

## 2022-12-09 NOTE — PROGRESS NOTES
Hospitalist Progress Note      PCP: Xiomara Cortez MD    Date of Admission: 12/7/2022    Chief Complaint: Chest pain, transfer for left heart catheterization, multivessel CAD    Hospital Course: The patient is a 57 y.o. female patient of [facility] history of CAD status stents in 2017, type 2 diabetes, hypertension, hyperlipidemia who presents with chest pain initially admitted to [facility]. Patient developed chest pain yesterday morning while walking to the bathroom. Pressure type pain associated with shortness of breath. Worsened with exertion improved with rest.  Patient went to [facility] and was diagnosed with concern for NSTEMI. She was transferred to [facility] for left heart catheterization. Cardiology consulted-left heart catheterization revealed three-vessel disease with apical hypokinesis on LV gram.  Patient is admitted for further evaluation and treatment. Cardiothoracic surgery was consulted due to the multivessel CAD. Subjective: Patient is seen at bedside this morning along with  in the room. Currently discussing with her that he would like to go ahead with CABG during this hospitalization as they are self-pay and would qualify for Medicare by the end of the year. Due to the severe triple-vessel CAD per CT surgery official recommendation of the safest course of action would be an inpatient CABG at this time.      Medications:  Reviewed    Infusion Medications    dextrose      sodium chloride       Scheduled Medications    atorvastatin  80 mg Oral Nightly    metoprolol succinate  25 mg Oral BID    aspirin  81 mg Oral Daily    vitamin D3  5,000 Units Oral QAM    magnesium oxide  400 mg Oral Nightly    calcium elemental  500 mg Oral Nightly    insulin lispro  0-8 Units SubCUTAneous TID WC    insulin lispro  0-4 Units SubCUTAneous Nightly    hydroCHLOROthiazide  12.5 mg Oral Daily    sodium chloride flush  5-40 mL IntraVENous 2 times per day     PRN Meds: perflutren lipid microspheres, nitroGLYCERIN, glucose, dextrose bolus **OR** dextrose bolus, glucagon (rDNA), dextrose, sodium chloride flush, sodium chloride, acetaminophen      Intake/Output Summary (Last 24 hours) at 12/9/2022 1154  Last data filed at 12/8/2022 2345  Gross per 24 hour   Intake 370 ml   Output 360 ml   Net 10 ml       Exam:    /76   Pulse 78   Temp 98.7 °F (37.1 °C) (Temporal)   Resp 15   Ht 5' 6\" (1.676 m)   Wt 153 lb 6.4 oz (69.6 kg)   SpO2 98%   BMI 24.76 kg/m²     General appearance: No apparent distress, appears stated age and cooperative. HEENT: Pupils equal, round, and reactive to light. Conjunctivae/corneas clear. Neck: Supple, with full range of motion. No jugular venous distention. Trachea midline. Respiratory:  Normal respiratory effort. Clear to auscultation, bilaterally without Rales/Wheezes/Rhonchi. Cardiovascular: Regular rate and rhythm with normal S1/S2 without murmurs, rubs or gallops. Abdomen: Soft, non-tender, non-distended with normal bowel sounds. Musculoskeletal: No clubbing, cyanosis or edema bilaterally. Full range of motion without deformity. Skin: Skin color, texture, turgor normal.  No rashes or lesions. Neurologic: No focal deficits, strength at baseline-5/5 in upper and lower extremities bilaterally  Psychiatric: Alert and oriented, thought content appropriate, normal insight    Labs:   Recent Labs     12/07/22  0210 12/08/22  0803   WBC 7.2 4.8   HGB 12.6 12.2   HCT 38.2 38.4    226     Recent Labs     12/07/22  0210 12/08/22  0803    134   K 4.1 3.9    100   CO2 20* 20*   BUN 9 9   CREATININE 0.6 0.5   CALCIUM 9.4 9.1     Recent Labs     12/07/22  0210   AST 20   ALT 18   BILITOT 0.3   ALKPHOS 71     No results for input(s): INR in the last 72 hours. No results for input(s): Donzella Rands in the last 72 hours.     Assessment/Plan:    Active Hospital Problems    Diagnosis Date Noted    CAD, multiple vessel [I25.10] 12/07/2022     Priority: Medium    NSTEMI (non-ST elevated myocardial infarction) ([facility] [I21.4] 12/06/2022     Priority: Medium    Type 2 diabetes mellitus ([facility] [E11.9] 06/03/2011    Benign essential hypertension [I10] 06/03/2011    HLD (hyperlipidemia) [E78.5] 06/03/2011   Multivessel CAD  Hypertriglyceridemia, hypercholesterolemia  Hypertension  Diabetes mellitus type 2-uncontrolled    Continue on telemetry  -Continue aspirin, statin echocardiogram ordered-shows an ejection fraction of 45-50%, severely sclerotic aortic valve without significant aortic stenosis or regurgitation  -Cardiology consulted-status post heart cath-multivessel CAD  -CT surgery consulted-preop work-up initiated for CABG  -Recent A1c reviewed-12.0.  Glipizide held due to risk for hypoglycemia. Sliding scale insulin, hypoglycemia protocol added. Would benefit from insulin at discharge due to A1c. Will monitor sugars at this time.  -Continue home medications for hypertension    DVT Prophylaxis: Per cardiology  Diet: ADULT DIET;  Regular; Low Fat/Low Chol/High Fiber/SOHAIL  Code Status: Prior    Dispo -ongoing specialist eval, preop work-up initiated per CT surgery for CABG    Vitaliy Cortez MD

## 2022-12-09 NOTE — CARE COORDINATION
Met with pt and pt's  in room earlier today. CTS was consulted. Plan remains to home at discharge. Voiced no needs at present. Will email [employer] to see pt. Sw/cm will follow.

## 2022-12-10 PROBLEM — Z91.119 DIETARY NONCOMPLIANCE: Status: ACTIVE | Noted: 2022-12-10

## 2022-12-10 LAB
ANION GAP SERPL CALCULATED.3IONS-SCNC: 13 MMOL/L (ref 7–16)
BUN BLDV-MCNC: 14 MG/DL (ref 6–23)
CALCIUM SERPL-MCNC: 9.9 MG/DL (ref 8.6–10.2)
CHLORIDE BLD-SCNC: 94 MMOL/L (ref 98–107)
CO2: 25 MMOL/L (ref 22–29)
CREAT SERPL-MCNC: 0.6 MG/DL (ref 0.5–1)
GFR SERPL CREATININE-BSD FRML MDRD: >60 ML/MIN/1.73
GLUCOSE BLD-MCNC: 312 MG/DL (ref 74–99)
METER GLUCOSE: 266 MG/DL (ref 74–99)
METER GLUCOSE: 298 MG/DL (ref 74–99)
METER GLUCOSE: 303 MG/DL (ref 74–99)
METER GLUCOSE: 353 MG/DL (ref 74–99)
POTASSIUM REFLEX MAGNESIUM: 4 MMOL/L (ref 3.5–5)
SODIUM BLD-SCNC: 132 MMOL/L (ref 132–146)

## 2022-12-10 PROCEDURE — 6370000000 HC RX 637 (ALT 250 FOR IP): Performed by: INTERNAL MEDICINE

## 2022-12-10 PROCEDURE — 6370000000 HC RX 637 (ALT 250 FOR IP): Performed by: STUDENT IN AN ORGANIZED HEALTH CARE EDUCATION/TRAINING PROGRAM

## 2022-12-10 PROCEDURE — S5553 INSULIN LONG ACTING 5 U: HCPCS | Performed by: INTERNAL MEDICINE

## 2022-12-10 PROCEDURE — 2140000000 HC CCU INTERMEDIATE R&B

## 2022-12-10 PROCEDURE — 36415 COLL VENOUS BLD VENIPUNCTURE: CPT

## 2022-12-10 PROCEDURE — 2580000003 HC RX 258: Performed by: INTERNAL MEDICINE

## 2022-12-10 PROCEDURE — 80048 BASIC METABOLIC PNL TOTAL CA: CPT

## 2022-12-10 PROCEDURE — 82962 GLUCOSE BLOOD TEST: CPT

## 2022-12-10 RX ORDER — INSULIN LISPRO 100 [IU]/ML
0-6 INJECTION, SOLUTION INTRAVENOUS; SUBCUTANEOUS
Status: DISCONTINUED | OUTPATIENT
Start: 2022-12-11 | End: 2022-12-11

## 2022-12-10 RX ORDER — INSULIN GLARGINE-YFGN 100 [IU]/ML
12 INJECTION, SOLUTION SUBCUTANEOUS NIGHTLY
Status: DISCONTINUED | OUTPATIENT
Start: 2022-12-10 | End: 2022-12-11

## 2022-12-10 RX ORDER — INSULIN LISPRO 100 [IU]/ML
0-4 INJECTION, SOLUTION INTRAVENOUS; SUBCUTANEOUS NIGHTLY
Status: DISCONTINUED | OUTPATIENT
Start: 2022-12-10 | End: 2022-12-12

## 2022-12-10 RX ORDER — INSULIN LISPRO 100 [IU]/ML
0-8 INJECTION, SOLUTION INTRAVENOUS; SUBCUTANEOUS
Status: DISCONTINUED | OUTPATIENT
Start: 2022-12-10 | End: 2022-12-10

## 2022-12-10 RX ORDER — LISINOPRIL 5 MG/1
5 TABLET ORAL DAILY
Status: DISCONTINUED | OUTPATIENT
Start: 2022-12-10 | End: 2022-12-10

## 2022-12-10 RX ORDER — INSULIN LISPRO 100 [IU]/ML
4 INJECTION, SOLUTION INTRAVENOUS; SUBCUTANEOUS
Status: DISCONTINUED | OUTPATIENT
Start: 2022-12-10 | End: 2022-12-11

## 2022-12-10 RX ADMIN — SODIUM CHLORIDE, PRESERVATIVE FREE 10 ML: 5 INJECTION INTRAVENOUS at 23:00

## 2022-12-10 RX ADMIN — INSULIN GLARGINE-YFGN 12 UNITS: 100 INJECTION, SOLUTION SUBCUTANEOUS at 20:24

## 2022-12-10 RX ADMIN — CALCIUM 500 MG: 500 TABLET ORAL at 20:18

## 2022-12-10 RX ADMIN — ASPIRIN 81 MG CHEWABLE TABLET 81 MG: 81 TABLET CHEWABLE at 09:05

## 2022-12-10 RX ADMIN — MAGNESIUM GLUCONATE 500 MG ORAL TABLET 400 MG: 500 TABLET ORAL at 20:19

## 2022-12-10 RX ADMIN — Medication 5000 UNITS: at 09:05

## 2022-12-10 RX ADMIN — SODIUM CHLORIDE, PRESERVATIVE FREE 10 ML: 5 INJECTION INTRAVENOUS at 09:05

## 2022-12-10 RX ADMIN — INSULIN LISPRO 8 UNITS: 100 INJECTION, SOLUTION INTRAVENOUS; SUBCUTANEOUS at 11:43

## 2022-12-10 RX ADMIN — METOPROLOL SUCCINATE 25 MG: 25 TABLET, EXTENDED RELEASE ORAL at 09:05

## 2022-12-10 RX ADMIN — INSULIN LISPRO 4 UNITS: 100 INJECTION, SOLUTION INTRAVENOUS; SUBCUTANEOUS at 16:08

## 2022-12-10 RX ADMIN — ATORVASTATIN CALCIUM 80 MG: 80 TABLET, FILM COATED ORAL at 20:18

## 2022-12-10 RX ADMIN — METOPROLOL SUCCINATE 25 MG: 25 TABLET, EXTENDED RELEASE ORAL at 20:18

## 2022-12-10 ASSESSMENT — PAIN SCALES - GENERAL: PAINLEVEL_OUTOF10: 0

## 2022-12-10 NOTE — CONSULTS
Met with patient to discuss what to expect from a cardiac rehab stand-point post-surgery. Reviewed walking 400 feet, practicing stairs, arm use, and sternal precautions. Patient stated she is very independent and should be able to acheive these goals. We will continue to follow patient after surgery.

## 2022-12-10 NOTE — PROGRESS NOTES
Hospitalist Progress Note      PCP: Xiomara Cortez MD    Date of Admission: 12/7/2022    Chief Complaint: Chest pain, transfer for left heart catheterization, multivessel CAD    Hospital Course: The patient is a 57 y.o. female patient of [facility]  history of CAD status stents in 2017, type 2 diabetes, hypertension, hyperlipidemia who presents with chest pain initially admitted to [facility]. Patient developed chest pain yesterday morning while walking to the bathroom. Pressure type pain associated with shortness of breath. Worsened with exertion improved with rest.  Patient went to [facility] and was diagnosed with concern for NSTEMI. She was transferred to [facility] for left heart catheterization. Cardiology consulted-left heart catheterization revealed three-vessel disease with apical hypokinesis on LV gram.  Patient is admitted for further evaluation and treatment. Cardiothoracic surgery was consulted due to the multivessel CAD. Subjective: Patient is seen at bedside this morning, does not complain of any chest pain at this time. Has discussed with  and extensively with CT surgeon and decided to go ahead with surgery this admission. Has been scheduled for Monday at 7 AM.  No other acute concerns at this time.   Medications:  Reviewed    Infusion Medications    dextrose      sodium chloride       Scheduled Medications    insulin lispro  0-8 Units SubCUTAneous TID WC    insulin lispro  0-4 Units SubCUTAneous Nightly    atorvastatin  80 mg Oral Nightly    metoprolol succinate  25 mg Oral BID    aspirin  81 mg Oral Daily    vitamin D3  5,000 Units Oral QAM    magnesium oxide  400 mg Oral Nightly    calcium elemental  500 mg Oral Nightly    sodium chloride flush  5-40 mL IntraVENous 2 times per day     PRN Meds: perflutren lipid microspheres, nitroGLYCERIN, glucose, dextrose bolus **OR** dextrose bolus, glucagon (rDNA), dextrose, sodium chloride flush, sodium chloride, acetaminophen      Intake/Output Summary (Last 24 hours) at 12/10/2022 1145  Last data filed at 12/10/2022 0443  Gross per 24 hour   Intake 2530 ml   Output 900 ml   Net 1630 ml       Exam:    /84   Pulse 82   Temp 97.9 °F (36.6 °C) (Oral)   Resp 18   Ht 5' 6\" (1.676 m)   Wt 153 lb 6.4 oz (69.6 kg)   SpO2 97%   BMI 24.76 kg/m²     General appearance: No apparent distress, appears stated age and cooperative. HEENT: Pupils equal, round, and reactive to light. Conjunctivae/corneas clear. Neck: Supple, with full range of motion. No jugular venous distention. Trachea midline. Respiratory:  Normal respiratory effort. Clear to auscultation, bilaterally without Rales/Wheezes/Rhonchi. Cardiovascular: Regular rate and rhythm with normal S1/S2 without murmurs, rubs or gallops. Abdomen: Soft, non-tender, non-distended with normal bowel sounds. Musculoskeletal: No clubbing, cyanosis or edema bilaterally. Full range of motion without deformity. Skin: Skin color, texture, turgor normal.  No rashes or lesions. Neurologic: No focal deficits, strength at baseline-5/5 in upper and lower extremities bilaterally  Psychiatric: Alert and oriented, thought content appropriate, normal insight    Labs:   Recent Labs     12/08/22  0803   WBC 4.8   HGB 12.2   HCT 38.4        Recent Labs     12/08/22  0803      K 3.9      CO2 20*   BUN 9   CREATININE 0.5   CALCIUM 9.1     No results for input(s): AST, ALT, BILIDIR, BILITOT, ALKPHOS in the last 72 hours. No results for input(s): INR in the last 72 hours. No results for input(s): Lalla Ill in the last 72 hours.     Assessment/Plan:    Active Hospital Problems    Diagnosis Date Noted    CAD in native artery [I25.10] 12/09/2022     Priority: Medium    CAD, multiple vessel [I25.10] 12/07/2022     Priority: Medium    NSTEMI (non-ST elevated myocardial infarction) [facility] [I21.4] 12/06/2022     Priority: Medium    Type 2 diabetes mellitus ([facility] [E11.9] 06/03/2011    Benign essential hypertension [I10] 06/03/2011    HLD (hyperlipidemia) [E78.5] 06/03/2011   Multivessel CAD  Hypertriglyceridemia, hypercholesterolemia  Hypertension  Diabetes mellitus type 2-uncontrolled    Continue on telemetry  -Continue aspirin, statin echocardiogram ordered-shows an ejection fraction of 45-50%, severely sclerotic aortic valve without significant aortic stenosis or regurgitation  -Cardiology consulted-status post heart cath-multivessel CAD  -CT surgery consulted-preop work-up initiated for CABG  -Recent A1c reviewed-12.0.  Glipizide held due to risk for hypoglycemia. Sliding scale insulin, hypoglycemia protocol added. Would benefit from insulin at discharge due to A1c. Will monitor sugars at this time.  -Sliding scale increased to medium dose. Endocrinology consulted  -Continue home medications for hypertension    DVT Prophylaxis: Per cardiology  Diet: ADULT DIET;  Regular; Low Fat/Low Chol/High Fiber/SOHAIL  Code Status: Full Code    Dispo -ongoing specialist eval, preop work-up initiated per CT surgery for CABG-scheduled for Monday    Andrew Briggs MD

## 2022-12-10 NOTE — CONSULTS
ENDOCRINOLOGY INITIAL CONSULTATION NOTE      Date of admission: 12/7/2022  Date of service: 12/10/2022  Admitting physician: Chauncey Marks MD   Primary Care Physician: Alessandro Avila MD  Consultant physician: Estevan Cerda MD     Reason for the consultation:  Uncontrolled DM    History of Present Illness: The history is provided by the patient. Accuracy of the patient data is excellent    Kavon Meneses is a very pleasant 57 y.o. old female with PMH of poorly controlled DM type 2, CAD s/p stent, HTN, HLD and other listed below admitted to [facility] on 12/7/2022 because of CP and found to have MVCAD, endocrine service was consulted for diabetes management. The patient was in her usual state of health until one day before admission when started c/o CP and SOB. She initially presented to [facility] and found to have NSTEMI. The patient then transferred to [facility] for cardiac Cath which showed MVCAD and scheduled for CABG surgery Monday     Prior to admission  The patient was diagnosed with type 2 DM eight years ago. Prior to admission patient was on Glipizide 10 mg AM, 5mg PM. Patient has had no hypoglycemic episodes. Patient has not been eating consistent carbohydrate meals, self-blood glucose monitoring has been above goal prior to admission. In addition, patient reports both  macrovascular and  microvascular complications.  The patient is not up to date with yearly diabetic eye exam.   Lab Results   Component Value Date/Time    LABA1C 12.1 12/08/2022 08:03 AM     Inpatient diet:   Carb Restricted diet     Point of care glucose monitoring   (Independently reviewed)   Recent Labs     12/08/22  2124 12/09/22  0621 12/09/22  1129 12/09/22  1617 12/09/22  2046 12/10/22  0704 12/10/22  1050 12/10/22  1550   GLUMET 264* 264* 253* 281* 284* 298* 353* 266*       Past medical history:   Past Medical History:   Diagnosis Date    2019 novel coronavirus disease (COVID-19) 11/10/2021    Coronary artery disease involving native coronary artery of native heart without angina pectoris     Diabetes mellitus ([facility]     Hyperlipidemia     Hypertension        Past surgical history:  Past Surgical History:   Procedure Laterality Date     SECTION      3  sections    CORONARY ANGIOPLASTY WITH STENT PLACEMENT  04/10/2017    3.0/38 and 3.0/20 Synergy stents to mid and distal RCA by Dr. Rodrigue Arthur history:   Tobacco:   reports that she has never smoked. She has never used smokeless tobacco.  Alcohol:   reports no history of alcohol use. Drugs:   reports no history of drug use. Family history:    Family History   Problem Relation Age of Onset    Diabetes Maternal Grandfather     Dementia Mother     High Blood Pressure Father        Allergy and drug reactions: Allergies   Allergen Reactions    Metformin Diarrhea       Scheduled Meds:   insulin lispro  0-4 Units SubCUTAneous Nightly    insulin glargine  12 Units SubCUTAneous Nightly    insulin lispro  4 Units SubCUTAneous TID WC    [START ON 2022] insulin lispro  0-6 Units SubCUTAneous TID WC    atorvastatin  80 mg Oral Nightly    metoprolol succinate  25 mg Oral BID    aspirin  81 mg Oral Daily    vitamin D3  5,000 Units Oral QAM    magnesium oxide  400 mg Oral Nightly    calcium elemental  500 mg Oral Nightly    sodium chloride flush  5-40 mL IntraVENous 2 times per day       PRN Meds:   perflutren lipid microspheres, 1.5 mL, ONCE PRN  nitroGLYCERIN, 0.4 mg, Q5 Min PRN  glucose, 4 tablet, PRN  dextrose bolus, 125 mL, PRN   Or  dextrose bolus, 250 mL, PRN  glucagon (rDNA), 1 mg, PRN  dextrose, , Continuous PRN  sodium chloride flush, 5-40 mL, PRN  sodium chloride, , PRN  acetaminophen, 650 mg, Q4H PRN      Continuous Infusions:   dextrose      sodium chloride         Review of Systems  All systems reviewed.  All negative except for symptoms mentioned in HPI     OBJECTIVE    /75   Pulse 68   Temp 98 °F (36.7 °C) (Temporal)   Resp 18   Ht 5' 6\" (1.676 m)   Wt 153 lb 6.4 oz (69.6 kg)   SpO2 99%   BMI 24.76 kg/m²     Intake/Output Summary (Last 24 hours) at 12/10/2022 1614  Last data filed at 12/10/2022 1305  Gross per 24 hour   Intake 2650 ml   Output 900 ml   Net 1750 ml       Physical examination:  General: awake alert, oriented x3  HEENT: normocephalic non traumatic, no exophthalmos   Neck: supple, No thyroid tenderness,  Pulm: good equal air entry no added sounds  CVS: S1 + S2  Abd: soft lax, no tenderness  Skin: warm, no lesions, no rash. No open wounds, no ulcers   Neuro: CN intact, sensation decreased bilateral , muscle power normal  Psych: normal mood, and affect    Review of Laboratory Data:  I personally reviewed the following labs:   Recent Labs     12/08/22  0803   WBC 4.8   RBC 4.72   HGB 12.2   HCT 38.4   MCV 81.4   MCH 25.8*   MCHC 31.8*   RDW 13.8      MPV 10.9     Recent Labs     12/08/22  0803 12/10/22  1152    132   K 3.9 4.0    94*   CO2 20* 25   BUN 9 14   CREATININE 0.5 0.6   GLUCOSE 255* 312*   CALCIUM 9.1 9.9     No results found for: BHYDRXBUT  Lab Results   Component Value Date/Time    LABA1C 12.1 12/08/2022 08:03 AM    LABA1C 12.0 12/07/2022 02:10 AM    LABA1C 12.3 12/06/2022 07:34 AM     No results found for: TSH, T4FREE, J7EVMHC, FT3, C6KGJUU, TSI, TPOABS, THGAB  Lab Results   Component Value Date/Time    LABA1C 12.1 12/08/2022 08:03 AM    GLUCOSE 312 12/10/2022 11:52 AM    GLUCOSE 186 11/08/2010 08:28 AM     Lab Results   Component Value Date/Time    TRIG 2,539 12/06/2022 07:34 AM    HDL 21 12/06/2022 07:34 AM    LDLCALC - 12/06/2022 07:34 AM    CHOL 449 12/06/2022 07:34 AM       Blood culture   No results found for: [facility]    Radiology:  CT CHEST WO CONTRAST   Final Result   Normal ascending thoracic aorta without calcification or dissection. There   is diffuse coronary artery calcification. Multiple 2-7 mm nonspecific pulmonary nodules.   Consider surveillance   according to [employer] guidelines         US CAROTID ARTERY BILATERAL   Final Result   Atherosclerotic disease. No hemodynamically significant stenosis is   identified   Estimated stenosis by NASCET criteria in the proximal right carotid   artery is between 0% and 49%. Estimated stenosis by NASCET criteria in the proximal left carotid   artery is between 0% and 49%. US DUP LOWER EXTREMITY MAPPING BILAT VENOUS   Final Result   1. Bilateral venous mapping as described. 2. No superficial venous thrombosis visualized. VL CATERINA BILATERAL LIMITED 1-2 LEVELS   Final Result          Medical Records/Labs/Images review:   I personally reviewed and summarized previous records   All labs and imaging were reviewed independently     Ayad Rivera, a 57 y.o.-old female seen today for inpatient diabetes management     Diabetes Mellitus type 2  Patient's diabetes is uncontrolled , A1c 12%  Pt was refusing insulin but after long discussion she agreed to start insulin at this time   For now, will change diabetes regimen to:  Lantus 12u nightly   Humalog 4u with meals   Low dose sliding scale   Continue glucose check with meals and at bedtime   Will titrate insulin dose based on the blood glucose trend & insulin requirement  Will arrange for patient to be seen in endocrinology clinic upon discharge for routine diabetes maintenance and prevention. NSTEMI  Scheduled for CABG surgery Monday  Discussed the importance of controlling DM in management of CAD     Dietary noncompliance  Discussed with patient the importance of eating consistent carbohydrate meals, avoiding high glycemic index food.  Also, discussed with patient the risk and negative consequences of dietary noncompliance on blood glucose control, blood pressure and weight    Interdisciplinary plan for communication with healthcare providers:   Consult recommendations were discussed with the Primary Service/Nursing staff      The above issues were reviewed with the patient who understood and agreed with the plan. Thank you for allowing us to participate in the care of this patient. Please do not hesitate to contact us with any additional questions. Claudia Erazo MD  Endocrinologist, [facility]   [address] [place] [id number]   Phone: [phone]  Fax: [phone]  --------------------------------------------  An electronic signature was used to authenticate this note.  Annamarie Garcia MD on 12/10/2022 at 4:14 PM

## 2022-12-10 NOTE — PROGRESS NOTES
CC:Chest pain     Brief HPI:  Patient seen with Dr. Nanci Rutherford. Awake, alert. No complaints.       Past Medical History:   Diagnosis Date    2019 novel coronavirus disease (COVID-19) 11/10/2021    Coronary artery disease involving native coronary artery of native heart without angina pectoris     Diabetes mellitus ([address]     Hyperlipidemia     Hypertension      Past Surgical History:   Procedure Laterality Date     SECTION      3  sections    CORONARY ANGIOPLASTY WITH STENT PLACEMENT  04/10/2017    3.0/38 and 3.0/20 Synergy stents to mid and distal RCA by Dr. Lilian Reza History     Socioeconomic History    Marital status:      Spouse name: Not on file    Number of children: Not on file    Years of education: Not on file    Highest education level: Not on file   Occupational History    Not on file   Tobacco Use    Smoking status: Never    Smokeless tobacco: Never   Substance and Sexual Activity    Alcohol use: No    Drug use: No    Sexual activity: Not on file   Other Topics Concern    Not on file   Social History Narrative    Not on file     Social Determinants of Health     Financial Resource Strain: Not on file   Food Insecurity: Not on file   Transportation Needs: Not on file   Physical Activity: Not on file   Stress: Not on file   Social Connections: Not on file   Intimate Partner Violence: Not on file   Housing Stability: Not on file     Family History   Problem Relation Age of Onset    Diabetes Maternal Grandfather     Dementia Mother     High Blood Pressure Father        Vitals:    22 2120 22 2317 12/10/22 0443 12/10/22 0805   BP: 117/82 114/66 110/60 107/84   Pulse: 86 67 58 82   Resp:  18 18 18   Temp:  97.7 °F (36.5 °C) 97.7 °F (36.5 °C) 97.9 °F (36.6 °C)   TempSrc:  Temporal Temporal Oral   SpO2:  99% 100% 97%   Weight:       Height:               Intake/Output Summary (Last 24 hours) at 12/10/2022 0915  Last data filed at 12/10/2022 0443  Gross per 24 hour   Intake 2650 ml   Output 900 ml   Net 1750 ml         Recent Labs     12/08/22  0803   WBC 4.8   HGB 12.2   HCT 38.4         Recent Labs     12/08/22  0803   BUN 9   CREATININE 0.5         Creatinine   Date/Time Value Ref Range Status   12/08/2022 08:03 AM 0.5 0.5 - 1.0 mg/dL Final   12/07/2022 02:10 AM 0.6 0.5 - 1.0 mg/dL Final   12/06/2022 07:34 AM 0.5 0.5 - 1.0 mg/dL Final         ROS:   Negative for CP, palpitations, SOB at rest, dizziness/lightheadedness. Physical Exam   Constitutional: Oriented to person, place, and time. Appears well-developed. No distress. Cardiovascular: Normal rate, regular rhythm and normal heart sounds. Pulmonary/Chest: Effort normal. No respiratory distress. Abdominal: Soft. Bowel sounds are normal.   Musculoskeletal: Normal range of motion. Neurological: alert and oriented to person, place, and time. Skin: Skin is warm and dry. Psychiatric: normal mood and affect.        ASSESSMENT:  Patient Active Problem List   Diagnosis    Benign essential hypertension    HLD (hyperlipidemia)    Type 2 diabetes mellitus ([facility] [address]    2019 novel coronavirus disease (COVID-19)    Coronary artery disease involving native coronary artery of native heart without angina pectoris    NSTEMI (non-ST elevated myocardial infarction) ([facility] [address]    History of right coronary artery stent placement    RBBB (right bundle branch block)    CAD, multiple vessel    CAD in native artery       NSTEMI, MVCAD      PLAN:  --Pre-operative testing completed  --Consult endocrinology for diabetes management - hgb a1C 12.1 - refusing insulin  --OR Monday for CABG   --NO ACEi, ARB or Entresto   --Half dose Toprol AM of surgery           Pre-operative testing review:   --carotids with no significant stenosis  --fam with good flow bilaterally  --ct chest reviewed  --TTE with no significant valvular abnormalities, EF 45-50%  --pft with FEV1 87 percent of predicted and DLCO 77 percent of predicted  --hgA1c 12.1      Recent Labs     12/08/22  0803   HGB 12.2   HCT 38.4        Recent Labs     12/08/22  0803   BUN 9   CREATININE 0.5     No results found for: LABURIN, LABURIN      [id number] Score for Atrial Fibrillation Stroke Risk   Risk   Factors  Component Value   C CHF  1   H HTN  1   A2 Age >= 75  0   D DM  1   S2 Prior Stroke/TIA  0   V Vascular Disease  1   A Age 74-69  0   Sc Sex  1    [id number]  Score  5       Disclaimer:     Risk Score calculation is dependent on accuracy of patient problem list and past encounter diagnosis. Preoperative NYHA Class: 2     Note: 25 minutes was spent providing face-to-face patient care, including:  and coordinating care, reviewing the chart, labs, and diagnostics, as well as medical decision making. Greater than 50% of this time was spent instructing and counseling the patient face to face regarding findings and recommendations.       Agree with above   CABG Monday

## 2022-12-10 NOTE — PROGRESS NOTES
DAILY PROGRESS NOTE -[facility] CARDIOLOGY    SUBJECTIVE:    Being followed for chest discomfort, non-STEMI, surgical CAD with ejection fraction 40-45% and apical akinesis at the time of heart catheterization. As per my note yesterday, she and her  both decided that she would undergo CABG while admitted. Echo yesterday showed LVEF 45-50% with distal anterior/apical hypokinesis. Only complaint is inability to fall back asleep after waking up. No chest discomfort or increasing dyspnea. Hypertension, severe hypertriglyceridemia, diabetes, CAD post inferior infarct and RCA drug-eluting stent 2017. Chronic RBBB. OBJECTIVE:    Her vital signs were reviewed today. Vitals:    12/10/22 0443   BP: 110/60   Pulse: 58   Resp: 18   Temp: 97.7 °F (36.5 °C)   SpO2: 100%       Scheduled Meds:   atorvastatin  80 mg Oral Nightly    metoprolol succinate  25 mg Oral BID    aspirin  81 mg Oral Daily    vitamin D3  5,000 Units Oral QAM    magnesium oxide  400 mg Oral Nightly    calcium elemental  500 mg Oral Nightly    insulin lispro  0-8 Units SubCUTAneous TID WC    insulin lispro  0-4 Units SubCUTAneous Nightly    hydroCHLOROthiazide  12.5 mg Oral Daily    sodium chloride flush  5-40 mL IntraVENous 2 times per day     Continuous Infusions:   dextrose      sodium chloride       PRN Meds:.perflutren lipid microspheres, nitroGLYCERIN, glucose, dextrose bolus **OR** dextrose bolus, glucagon (rDNA), dextrose, sodium chloride flush, sodium chloride, acetaminophen    REVIEW OF SYSTEMS:     No pruritus, rash, bruising. Cardiac and pulmonary symptoms per HPI. No nausea, vomiting, abdominal pain, GI bleeding, change in bowel habits. No dysuria, urinary frequency, urgency, hematuria, flank pain. Joint pain but no muscle soreness, stiffness, aching. No headache, speech disturbance, lateralizing neurologic deficit. No hemoptysis, epistaxis, easy bruising. Persistent anxiety, but no depression.   No symptoms of hypothyroidism, hyperthyroidism, diabetes, heat or cold intolerance. FAMILY HISTORY: Negative for CAD in first-degree relatives. SOCIAL HISTORY: Negative for alcohol, tobacco, or illicit drug use. PHYSICAL EXAM:    General Appearance:  awake, alert, oriented, in no acute distress  Neck:  no bruits  Lungs:  Normal expansion. Clear to auscultation. No rales, rhonchi, or wheezing. Heart:  Heart sounds are normal.  Regular rate and rhythm without murmur, gallop or rub. Abdomen:  Soft, non-tender. Extremities: Extremities warm to touch, pink, with no edema. Neuro/musculosketal:  Unremarkable. LABS:    Recent Labs     12/08/22  0803      CREATININE 0.5       Recent Labs     12/08/22  0803   HGB 12.2       No results for input(s): INR in the last 72 hours. IMPRESSION:    1.  CAD post non-STEMI with surgical disease-she has agreed to undergo CABG this admission although she initially was considering not having it. Scheduled for either Monday or Tuesday and undergoing preoperative testing. Continue aspirin. 2.  Ischemic cardiomyopathy-echocardiogram as above. Was on lisinopril which I stopped in favor of Entresto although she has difficulty affording it because of cost.  Restart lisinopril at lower dose while continuing metoprolol succinate at the current dosage. 3.  Severe hyperlipidemia-triglycerides 1200 and LDL unable to be calculated. Stop the fenofibrate and start oral atorvastatin 80 mg nightly. I also added Zetia to achieve LDL goal below 70 and preferably closer to 50.    4.  Diabetes-per hospitalist team.    5.  Continue to follow.

## 2022-12-11 LAB
ABO/RH: NORMAL
ALBUMIN SERPL-MCNC: 4.3 G/DL (ref 3.5–5.2)
ALP BLD-CCNC: 79 U/L (ref 35–104)
ALT SERPL-CCNC: 33 U/L (ref 0–32)
ANION GAP SERPL CALCULATED.3IONS-SCNC: 13 MMOL/L (ref 7–16)
ANION GAP SERPL CALCULATED.3IONS-SCNC: 14 MMOL/L (ref 7–16)
ANTIBODY SCREEN: NORMAL
APTT: 28.3 SEC (ref 24.5–35.1)
AST SERPL-CCNC: 21 U/L (ref 0–31)
BILIRUB SERPL-MCNC: 0.6 MG/DL (ref 0–1.2)
BUN BLDV-MCNC: 12 MG/DL (ref 6–23)
BUN BLDV-MCNC: 13 MG/DL (ref 6–23)
CALCIUM SERPL-MCNC: 9.7 MG/DL (ref 8.6–10.2)
CALCIUM SERPL-MCNC: 9.7 MG/DL (ref 8.6–10.2)
CHLORIDE BLD-SCNC: 101 MMOL/L (ref 98–107)
CHLORIDE BLD-SCNC: 98 MMOL/L (ref 98–107)
CO2: 20 MMOL/L (ref 22–29)
CO2: 21 MMOL/L (ref 22–29)
CREAT SERPL-MCNC: 0.5 MG/DL (ref 0.5–1)
CREAT SERPL-MCNC: 0.5 MG/DL (ref 0.5–1)
GFR SERPL CREATININE-BSD FRML MDRD: >60 ML/MIN/1.73
GFR SERPL CREATININE-BSD FRML MDRD: >60 ML/MIN/1.73
GLUCOSE BLD-MCNC: 291 MG/DL (ref 74–99)
GLUCOSE BLD-MCNC: 357 MG/DL (ref 74–99)
HCT VFR BLD CALC: 39.4 % (ref 34–48)
HEMOGLOBIN: 12.9 G/DL (ref 11.5–15.5)
INR BLD: 1.1
MCH RBC QN AUTO: 26.4 PG (ref 26–35)
MCHC RBC AUTO-ENTMCNC: 32.7 % (ref 32–34.5)
MCV RBC AUTO: 80.6 FL (ref 80–99.9)
METER GLUCOSE: 186 MG/DL (ref 74–99)
METER GLUCOSE: 196 MG/DL (ref 74–99)
METER GLUCOSE: 257 MG/DL (ref 74–99)
METER GLUCOSE: 258 MG/DL (ref 74–99)
MRSA CULTURE ONLY: NORMAL
PDW BLD-RTO: 13.7 FL (ref 11.5–15)
PLATELET # BLD: 240 E9/L (ref 130–450)
PMV BLD AUTO: 10.7 FL (ref 7–12)
POTASSIUM REFLEX MAGNESIUM: 4.2 MMOL/L (ref 3.5–5)
POTASSIUM SERPL-SCNC: 4.2 MMOL/L (ref 3.5–5)
PROTHROMBIN TIME: 11.8 SEC (ref 9.3–12.4)
RBC # BLD: 4.89 E12/L (ref 3.5–5.5)
SODIUM BLD-SCNC: 132 MMOL/L (ref 132–146)
SODIUM BLD-SCNC: 135 MMOL/L (ref 132–146)
TOTAL PROTEIN: 7.1 G/DL (ref 6.4–8.3)
URINE CULTURE, ROUTINE: NORMAL
WBC # BLD: 5.6 E9/L (ref 4.5–11.5)

## 2022-12-11 PROCEDURE — 6370000000 HC RX 637 (ALT 250 FOR IP): Performed by: STUDENT IN AN ORGANIZED HEALTH CARE EDUCATION/TRAINING PROGRAM

## 2022-12-11 PROCEDURE — 86923 COMPATIBILITY TEST ELECTRIC: CPT

## 2022-12-11 PROCEDURE — 6370000000 HC RX 637 (ALT 250 FOR IP): Performed by: INTERNAL MEDICINE

## 2022-12-11 PROCEDURE — 85730 THROMBOPLASTIN TIME PARTIAL: CPT

## 2022-12-11 PROCEDURE — 80048 BASIC METABOLIC PNL TOTAL CA: CPT

## 2022-12-11 PROCEDURE — 85610 PROTHROMBIN TIME: CPT

## 2022-12-11 PROCEDURE — 2580000003 HC RX 258: Performed by: INTERNAL MEDICINE

## 2022-12-11 PROCEDURE — 36415 COLL VENOUS BLD VENIPUNCTURE: CPT

## 2022-12-11 PROCEDURE — 82962 GLUCOSE BLOOD TEST: CPT

## 2022-12-11 PROCEDURE — 2140000000 HC CCU INTERMEDIATE R&B

## 2022-12-11 PROCEDURE — 6370000000 HC RX 637 (ALT 250 FOR IP)

## 2022-12-11 PROCEDURE — 86850 RBC ANTIBODY SCREEN: CPT

## 2022-12-11 PROCEDURE — 86901 BLOOD TYPING SEROLOGIC RH(D): CPT

## 2022-12-11 PROCEDURE — 86900 BLOOD TYPING SEROLOGIC ABO: CPT

## 2022-12-11 PROCEDURE — 85027 COMPLETE CBC AUTOMATED: CPT

## 2022-12-11 PROCEDURE — 80053 COMPREHEN METABOLIC PANEL: CPT

## 2022-12-11 PROCEDURE — S5553 INSULIN LONG ACTING 5 U: HCPCS | Performed by: INTERNAL MEDICINE

## 2022-12-11 RX ORDER — INSULIN LISPRO 100 [IU]/ML
0-12 INJECTION, SOLUTION INTRAVENOUS; SUBCUTANEOUS
Status: DISCONTINUED | OUTPATIENT
Start: 2022-12-11 | End: 2022-12-12

## 2022-12-11 RX ORDER — EZETIMIBE 10 MG/1
10 TABLET ORAL NIGHTLY
Status: DISCONTINUED | OUTPATIENT
Start: 2022-12-11 | End: 2022-12-12

## 2022-12-11 RX ORDER — CHLORHEXIDINE GLUCONATE 4 G/100ML
SOLUTION TOPICAL SEE ADMIN INSTRUCTIONS
Status: DISCONTINUED | OUTPATIENT
Start: 2022-12-11 | End: 2022-12-12 | Stop reason: HOSPADM

## 2022-12-11 RX ORDER — INSULIN LISPRO 100 [IU]/ML
7 INJECTION, SOLUTION INTRAVENOUS; SUBCUTANEOUS
Status: DISCONTINUED | OUTPATIENT
Start: 2022-12-11 | End: 2022-12-12

## 2022-12-11 RX ORDER — METOPROLOL SUCCINATE 25 MG/1
12.5 TABLET, EXTENDED RELEASE ORAL ONCE
Status: COMPLETED | OUTPATIENT
Start: 2022-12-12 | End: 2022-12-12

## 2022-12-11 RX ORDER — CHLORHEXIDINE GLUCONATE 0.12 MG/ML
15 RINSE ORAL ONCE
Status: COMPLETED | OUTPATIENT
Start: 2022-12-12 | End: 2022-12-12

## 2022-12-11 RX ORDER — INSULIN GLARGINE-YFGN 100 [IU]/ML
20 INJECTION, SOLUTION SUBCUTANEOUS NIGHTLY
Status: DISCONTINUED | OUTPATIENT
Start: 2022-12-11 | End: 2022-12-12

## 2022-12-11 RX ADMIN — ATORVASTATIN CALCIUM 80 MG: 80 TABLET, FILM COATED ORAL at 20:31

## 2022-12-11 RX ADMIN — EZETIMIBE 10 MG: 10 TABLET ORAL at 21:36

## 2022-12-11 RX ADMIN — INSULIN LISPRO 6 UNITS: 100 INJECTION, SOLUTION INTRAVENOUS; SUBCUTANEOUS at 08:18

## 2022-12-11 RX ADMIN — INSULIN GLARGINE-YFGN 20 UNITS: 100 INJECTION, SOLUTION SUBCUTANEOUS at 20:35

## 2022-12-11 RX ADMIN — INSULIN LISPRO 7 UNITS: 100 INJECTION, SOLUTION INTRAVENOUS; SUBCUTANEOUS at 12:02

## 2022-12-11 RX ADMIN — INSULIN LISPRO 7 UNITS: 100 INJECTION, SOLUTION INTRAVENOUS; SUBCUTANEOUS at 16:21

## 2022-12-11 RX ADMIN — METOPROLOL SUCCINATE 25 MG: 25 TABLET, EXTENDED RELEASE ORAL at 20:31

## 2022-12-11 RX ADMIN — INSULIN LISPRO 2 UNITS: 100 INJECTION, SOLUTION INTRAVENOUS; SUBCUTANEOUS at 16:23

## 2022-12-11 RX ADMIN — CHLORHEXIDINE GLUCONATE: 213 SOLUTION TOPICAL at 21:36

## 2022-12-11 RX ADMIN — MAGNESIUM GLUCONATE 500 MG ORAL TABLET 400 MG: 500 TABLET ORAL at 21:42

## 2022-12-11 RX ADMIN — MUPIROCIN: 20 OINTMENT TOPICAL at 21:31

## 2022-12-11 RX ADMIN — SODIUM CHLORIDE, PRESERVATIVE FREE 10 ML: 5 INJECTION INTRAVENOUS at 08:03

## 2022-12-11 RX ADMIN — Medication 5000 UNITS: at 08:02

## 2022-12-11 RX ADMIN — INSULIN LISPRO 6 UNITS: 100 INJECTION, SOLUTION INTRAVENOUS; SUBCUTANEOUS at 12:16

## 2022-12-11 RX ADMIN — ASPIRIN 81 MG CHEWABLE TABLET 81 MG: 81 TABLET CHEWABLE at 08:02

## 2022-12-11 RX ADMIN — CALCIUM 500 MG: 500 TABLET ORAL at 21:31

## 2022-12-11 RX ADMIN — INSULIN LISPRO 7 UNITS: 100 INJECTION, SOLUTION INTRAVENOUS; SUBCUTANEOUS at 08:20

## 2022-12-11 RX ADMIN — METOPROLOL SUCCINATE 25 MG: 25 TABLET, EXTENDED RELEASE ORAL at 08:02

## 2022-12-11 ASSESSMENT — LIFESTYLE VARIABLES: SMOKING_STATUS: 0

## 2022-12-11 ASSESSMENT — ENCOUNTER SYMPTOMS: SHORTNESS OF BREATH: 1

## 2022-12-11 NOTE — PROGRESS NOTES
Hospitalist Progress Note      PCP: Jenni Ricci MD    Date of Admission: 12/7/2022    Chief Complaint: Chest pain, transfer for left heart catheterization, multivessel CAD    Hospital Course: The patient is a 57 y.o. female patient of [facility] history of CAD status stents in 2017, type 2 diabetes, hypertension, hyperlipidemia who presents with chest pain initially admitted to [facility]. Patient developed chest pain yesterday morning while walking to the bathroom. Pressure type pain associated with shortness of breath. Worsened with exertion improved with rest.  Patient went to [facility] and was diagnosed with concern for NSTEMI. She was transferred to [facility] for left heart catheterization. Cardiology consulted-left heart catheterization revealed three-vessel disease with apical hypokinesis on LV gram.  Patient is admitted for further evaluation and treatment. Cardiothoracic surgery was consulted due to the multivessel CAD. Subjective: Patient is seen at bedside this morning, does not complain of any chest pain at this time. Has been scheduled for Monday at 7 AM.  No other acute concerns at this time.   Medications:  Reviewed    Infusion Medications    dextrose      sodium chloride       Scheduled Medications    ezetimibe  10 mg Oral Nightly    insulin lispro  0-12 Units SubCUTAneous TID WC    insulin lispro  7 Units SubCUTAneous TID WC    insulin glargine  20 Units SubCUTAneous Nightly    [START ON 12/12/2022] metoprolol succinate  12.5 mg Oral Once    ceFAZolin (ANCEF) IVPB  2,000 mg IntraVENous On Call to OR    mupirocin   Nasal BID    [START ON 12/12/2022] chlorhexidine  15 mL Mouth/Throat Once    chlorhexidine   Topical See Admin Instructions    insulin lispro  0-4 Units SubCUTAneous Nightly    atorvastatin  80 mg Oral Nightly    metoprolol succinate  25 mg Oral BID    aspirin  81 mg Oral Daily    vitamin D3  5,000 Units Oral QAM    magnesium oxide  400 mg Oral Nightly    calcium elemental  500 mg Oral Nightly    sodium chloride flush  5-40 mL IntraVENous 2 times per day     PRN Meds: perflutren lipid microspheres, nitroGLYCERIN, glucose, dextrose bolus **OR** dextrose bolus, glucagon (rDNA), dextrose, sodium chloride flush, sodium chloride, acetaminophen      Intake/Output Summary (Last 24 hours) at 12/11/2022 1051  Last data filed at 12/11/2022 0443  Gross per 24 hour   Intake 480 ml   Output 1150 ml   Net -670 ml       Exam:    /69   Pulse 76   Temp 97.5 °F (36.4 °C) (Temporal)   Resp 18   Ht 5' 6\" (1.676 m)   Wt 153 lb 6.4 oz (69.6 kg)   SpO2 99%   BMI 24.76 kg/m²     General appearance: No apparent distress, appears stated age and cooperative. HEENT: Pupils equal, round, and reactive to light. Conjunctivae/corneas clear. Neck: Supple, with full range of motion. No jugular venous distention. Trachea midline. Respiratory:  Normal respiratory effort. Clear to auscultation, bilaterally without Rales/Wheezes/Rhonchi. Cardiovascular: Regular rate and rhythm with normal S1/S2 without murmurs, rubs or gallops. Abdomen: Soft, non-tender, non-distended with normal bowel sounds. Musculoskeletal: No clubbing, cyanosis or edema bilaterally. Full range of motion without deformity. Skin: Skin color, texture, turgor normal.  No rashes or lesions.   Neurologic: No focal deficits, strength at baseline-5/5 in upper and lower extremities bilaterally  Psychiatric: Alert and oriented, thought content appropriate, normal insight    Labs:   Recent Labs     12/11/22  0832   WBC 5.6   HGB 12.9   HCT 39.4        Recent Labs     12/10/22  1152 12/11/22  0644 12/11/22  0832    135 132   K 4.0 4.2 4.2   CL 94* 101 98   CO2 25 21* 20*   BUN 14 12 13   CREATININE 0.6 0.5 0.5   CALCIUM 9.9 9.7 9.7     Recent Labs     12/11/22  0832   AST 21   ALT 33*   BILITOT 0.6   ALKPHOS 79       Recent Labs     12/11/22  0832   INR 1.1       No results for input(s): CKTOTAL, TROPONINI in the last 72 hours. Assessment/Plan:    Active Hospital Problems    Diagnosis Date Noted    Dietary noncompliance [Z91.119] 12/10/2022     Priority: Medium    CAD in native artery [I25.10] 12/09/2022     Priority: Medium    CAD, multiple vessel [I25.10] 12/07/2022     Priority: Medium    NSTEMI (non-ST elevated myocardial infarction) ([facility] [address] [I21.4] 12/06/2022     Priority: Medium    Poorly controlled type 2 diabetes mellitus ([facility] [address] [E11.65] 06/03/2011    Benign essential hypertension [I10] 06/03/2011    HLD (hyperlipidemia) [E78.5] 06/03/2011   Multivessel CAD  Hypertriglyceridemia, hypercholesterolemia  Hypertension  Diabetes mellitus type 2-uncontrolled    Continue on telemetry  -Continue aspirin, statin echocardiogram ordered-shows an ejection fraction of 45-50%, severely sclerotic aortic valve without significant aortic stenosis or regurgitation  -Cardiology consulted-status post heart cath-multivessel CAD  -CT surgery consulted-preop work-up initiated for CABG  -Recent A1c reviewed-12.0.  Glipizide held due to risk for hypoglycemia. Sliding scale insulin, hypoglycemia protocol added. Endocrinology consulted-after long discussion patient was initiated on Lantus 12 units nightly, Humalog 4 units with meals, titration per endocrinology-appreciate recommendations  -Continue home medications for hypertension    DVT Prophylaxis: Per cardiology  Diet: ADULT DIET;  Regular; Low Fat/Low Chol/High Fiber/SOHAIL  Diet NPO Exceptions are: Sips of Water with Meds  Code Status: Full Code    Dispo -ongoing specialist eval, preop work-up initiated per CT surgery for CABG-scheduled for Monday    Jimi Bravo MD

## 2022-12-11 NOTE — PROGRESS NOTES
ENDOCRINOLOGY PROGRESS NOTE      Date of admission: 12/7/2022  Date of service: 12/11/2022  Admitting physician: Hailey Turner MD   Primary Care Physician: Lili Munoz MD  Consultant physician: Tamie Segundo MD     Reason for the consultation:  Uncontrolled DM    History of Present Illness: The history is provided by the patient. Accuracy of the patient data is excellent    Armond Aparicio is a very pleasant 57 y.o. old female with PMH of poorly controlled DM type 2, CAD s/p stent, HTN, HLD and other listed below admitted to [facility] on 12/7/2022 because of CP and found to have MVCAD, endocrine service was consulted for diabetes management. The patient was in her usual state of health until one day before admission when started c/o CP and SOB. She initially presented to [facility] and found to have NSTEMI.  The patient then transferred to [facility] for cardiac Cath which showed MVCAD and scheduled for CABG surgery Monday     Subjective   Pt was seen and examined this AM, no acute events, BG still high but improving     Inpatient diet:   Carb Restricted diet     Point of care glucose monitoring   (Independently reviewed)   Recent Labs     12/09/22  1129 12/09/22  1617 12/09/22  2046 12/10/22  0704 12/10/22  1050 12/10/22  1550 12/10/22  2022 12/11/22  0622   GLUMET 253* 281* 284* 298* 353* 266* 303* 257*   Scheduled Meds:   ezetimibe  10 mg Oral Nightly    insulin lispro  0-12 Units SubCUTAneous TID WC    insulin lispro  7 Units SubCUTAneous TID WC    insulin glargine  20 Units SubCUTAneous Nightly    insulin lispro  0-4 Units SubCUTAneous Nightly    atorvastatin  80 mg Oral Nightly    metoprolol succinate  25 mg Oral BID    aspirin  81 mg Oral Daily    vitamin D3  5,000 Units Oral QAM    magnesium oxide  400 mg Oral Nightly    calcium elemental  500 mg Oral Nightly    sodium chloride flush  5-40 mL IntraVENous 2 times per day       PRN Meds:   perflutren lipid microspheres, 1.5 mL, ONCE PRN  nitroGLYCERIN, 0.4 mg, Q5 Min PRN  glucose, 4 tablet, PRN  dextrose bolus, 125 mL, PRN   Or  dextrose bolus, 250 mL, PRN  glucagon (rDNA), 1 mg, PRN  dextrose, , Continuous PRN  sodium chloride flush, 5-40 mL, PRN  sodium chloride, , PRN  acetaminophen, 650 mg, Q4H PRN    Continuous Infusions:   dextrose      sodium chloride         Review of Systems  All systems reviewed. All negative except for symptoms mentioned in HPI     OBJECTIVE    /74   Pulse 61   Temp 97.5 °F (36.4 °C) (Temporal)   Resp 18   Ht 5' 6\" (1.676 m)   Wt 153 lb 6.4 oz (69.6 kg)   SpO2 99%   BMI 24.76 kg/m²     Intake/Output Summary (Last 24 hours) at 12/11/2022 0747  Last data filed at 12/11/2022 0443  Gross per 24 hour   Intake 600 ml   Output 1150 ml   Net -550 ml       Physical examination:  General: awake alert, oriented x3  HEENT: normocephalic non traumatic, no exophthalmos   Neck: supple, No thyroid tenderness,  Pulm: good equal air entry no added sounds  CVS: S1 + S2  Abd: soft lax, no tenderness  Skin: warm, no lesions, no rash.  No open wounds, no ulcers   Neuro: CN intact, sensation decreased bilateral , muscle power normal  Psych: normal mood, and affect    Review of Laboratory Data:  I personally reviewed the following labs:   Recent Labs     12/08/22  0803   WBC 4.8   RBC 4.72   HGB 12.2   HCT 38.4   MCV 81.4   MCH 25.8*   MCHC 31.8*   RDW 13.8      MPV 10.9     Recent Labs     12/08/22  0803 12/10/22  1152 12/11/22  0644    132 135   K 3.9 4.0 4.2    94* 101   CO2 20* 25 21*   BUN 9 14 12   CREATININE 0.5 0.6 0.5   GLUCOSE 255* 312* 291*   CALCIUM 9.1 9.9 9.7     No results found for: BHYDRXBUT  Lab Results   Component Value Date/Time    LABA1C 12.1 12/08/2022 08:03 AM    LABA1C 12.0 12/07/2022 02:10 AM    LABA1C 12.3 12/06/2022 07:34 AM     No results found for: TSH, T4FREE, S3SETZB, FT3, S6LRLJT, TSI, TPOABS, THGAB  Lab Results   Component Value Date/Time    LABA1C 12.1 12/08/2022 08:03 AM    GLUCOSE 291 12/11/2022 06:44 AM GLUCOSE 186 11/08/2010 08:28 AM     Lab Results   Component Value Date/Time    TRIG 2,539 12/06/2022 07:34 AM    HDL 21 12/06/2022 07:34 AM    LDLCALC - 12/06/2022 07:34 AM    CHOL 449 12/06/2022 07:34 AM       Blood culture   No results found for: [facility]    Radiology:  CT CHEST WO CONTRAST   Final Result   Normal ascending thoracic aorta without calcification or dissection. There   is diffuse coronary artery calcification. Multiple 2-7 mm nonspecific pulmonary nodules. Consider surveillance   according to [address]   Final Result   Atherosclerotic disease. No hemodynamically significant stenosis is   identified   Estimated stenosis by NASCET criteria in the proximal right carotid   artery is between 0% and 49%. Estimated stenosis by NASCET criteria in the proximal left carotid   artery is between 0% and 49%. US DUP LOWER EXTREMITY MAPPING BILAT VENOUS   Final Result   1. Bilateral venous mapping as described. 2. No superficial venous thrombosis visualized. VL CATERINA BILATERAL LIMITED 1-2 LEVELS   Final Result          Medical Records/Labs/Images review:   I personally reviewed and summarized previous records   All labs and imaging were reviewed independently     [address] [place] [address], a 57 y.o.-old female seen today for inpatient diabetes management     Diabetes Mellitus type 2  Patient's diabetes is uncontrolled , A1c 12%  Pt was refusing insulin but after long discussion she agreed to start insulin at this time   will change diabetes regimen to: Increase Lantus 20u nightly   Increase Humalog 7u with meals   Increase ss to medium dose sliding scale   Continue glucose check with meals and at bedtime   Will titrate insulin dose based on the blood glucose trend & insulin requirement  Will arrange for patient to be seen in endocrinology clinic upon discharge for routine diabetes maintenance and prevention.     NSTEMI  Scheduled for CABG surgery Monday  Discussed the importance of controlling DM in management of CAD     Dietary noncompliance  Discussed with patient the importance of eating consistent carbohydrate meals, avoiding high glycemic index food. Also, discussed with patient the risk and negative consequences of dietary noncompliance on blood glucose control, blood pressure and weight    Interdisciplinary plan for communication with healthcare providers:   Consult recommendations were discussed with the Primary Service/Nursing staff      The above issues were reviewed with the patient who understood and agreed with the plan. Thank you for allowing us to participate in the care of this patient. Please do not hesitate to contact us with any additional questions. Pippa Miranda MD  Endocrinologist, [facility] Diabetes Care and Endocrinology   [address] [facility] [address]   Phone: [phone]  Fax: [phone]  --------------------------------------------  An electronic signature was used to authenticate this note.  Elías Caballero MD on 12/11/2022 at 7:47 AM

## 2022-12-11 NOTE — PROGRESS NOTES
DAILY PROGRESS NOTE -[facility] CARDIOLOGY    SUBJECTIVE:    Feeling well with no further chest discomfort. CABG scheduled for tomorrow 12/12 at 7 AM.  Echocardiogram while admitted showed ejection fraction 45-50% with mild mid-distal anterior and apical hypokinesis. Unstable angina with heart catheterization showing surgical disease. Hypertension, severe hypertriglyceridemia, diabetes, CAD post inferior infarct and RCA drug-eluting stent 2017. Chronic RBBB. OBJECTIVE:    Her vital signs were reviewed today. Vitals:    12/11/22 0443   BP: 112/74   Pulse: 61   Resp: 18   Temp: 97.5 °F (36.4 °C)   SpO2: 99%       Scheduled Meds:   insulin lispro  0-4 Units SubCUTAneous Nightly    insulin glargine  12 Units SubCUTAneous Nightly    insulin lispro  4 Units SubCUTAneous TID WC    insulin lispro  0-6 Units SubCUTAneous TID WC    atorvastatin  80 mg Oral Nightly    metoprolol succinate  25 mg Oral BID    aspirin  81 mg Oral Daily    vitamin D3  5,000 Units Oral QAM    magnesium oxide  400 mg Oral Nightly    calcium elemental  500 mg Oral Nightly    sodium chloride flush  5-40 mL IntraVENous 2 times per day     Continuous Infusions:   dextrose      sodium chloride       PRN Meds:.perflutren lipid microspheres, nitroGLYCERIN, glucose, dextrose bolus **OR** dextrose bolus, glucagon (rDNA), dextrose, sodium chloride flush, sodium chloride, acetaminophen    REVIEW OF SYSTEMS:     No pruritus, rash, bruising. Cardiac and pulmonary symptoms per HPI. No nausea, vomiting, abdominal pain, GI bleeding, change in bowel habits. No dysuria, urinary frequency, urgency, hematuria, flank pain. Joint pain but no muscle soreness, stiffness, aching. No headache, speech disturbance, lateralizing neurologic deficit. No hemoptysis, epistaxis, easy bruising. Persistent anxiety, but no depression. No symptoms of hypothyroidism, hyperthyroidism, diabetes, heat or cold intolerance.     FAMILY HISTORY: Negative for CAD in first-degree relatives. SOCIAL HISTORY: Negative for alcohol, tobacco, or illicit drug use. PHYSICAL EXAM:    General Appearance:  awake, alert, oriented, in no acute distress  Neck:  no bruits  Lungs:  Normal expansion. Clear to auscultation. No rales, rhonchi, or wheezing. Heart:  Heart sounds are normal.  Regular rate and rhythm without murmur, gallop or rub. Abdomen:  Soft, non-tender. Extremities: Extremities warm to touch, pink, with no edema. Neuro/musculosketal:  Unremarkable. LABS:    Recent Labs     12/08/22  0803 12/10/22  1152    132   CREATININE 0.5 0.6       Recent Labs     12/08/22  0803   HGB 12.2       No results for input(s): INR in the last 72 hours. IMPRESSION:    1.  CAD post non-STEMI with surgical disease-she has agreed to undergo CABG this admission although she initially was considering not having it. Scheduled for tomorrow at 7 AM.  Continue aspirin. 2.  Ischemic cardiomyopathy-echocardiogram as above. Complains of cough while on lisinopril and in the setting of ischemic cardiomyopathy would like to use valsartan instead. Hold off on lisinopril perioperatively and start valsartan after CABG. 3.  Severe hyperlipidemia-triglycerides 1200 and LDL unable to be calculated. Stop the fenofibrate and start oral atorvastatin 80 mg nightly. I also added Zetia to achieve LDL goal below 70 and preferably closer to 50.    4.  Diabetes-per hospitalist team.    5.  Continue to follow.

## 2022-12-11 NOTE — PROGRESS NOTES
CC:Chest pain     Brief HPI:  Patient seen with Dr. Kirsty Werner. Awake, alert. No complaints.       Past Medical History:   Diagnosis Date    2019 novel coronavirus disease (COVID-19) 11/10/2021    Coronary artery disease involving native coronary artery of native heart without angina pectoris     Diabetes mellitus ([facility] [address]     Hyperlipidemia     Hypertension      Past Surgical History:   Procedure Laterality Date     SECTION      3  sections    CORONARY ANGIOPLASTY WITH STENT PLACEMENT  04/10/2017    3.0/38 and 3.0/20 Synergy stents to mid and distal RCA by Dr. Chante Garcia History     Socioeconomic History    Marital status:      Spouse name: Not on file    Number of children: Not on file    Years of education: Not on file    Highest education level: Not on file   Occupational History    Not on file   Tobacco Use    Smoking status: Never    Smokeless tobacco: Never   Substance and Sexual Activity    Alcohol use: No    Drug use: No    Sexual activity: Not on file   Other Topics Concern    Not on file   Social History Narrative    Not on file     Social Determinants of Health     Financial Resource Strain: Not on file   Food Insecurity: Not on file   Transportation Needs: Not on file   Physical Activity: Not on file   Stress: Not on file   Social Connections: Not on file   Intimate Partner Violence: Not on file   Housing Stability: Not on file     Family History   Problem Relation Age of Onset    Diabetes Maternal Grandfather     Dementia Mother     High Blood Pressure Father        Vitals:    12/10/22 1945 12/10/22 2326 22 0443 22 0802   BP: 123/74 116/78 112/74 134/69   Pulse: 76 66 61 76   Resp: 18 18 18    Temp: 97.7 °F (36.5 °C) 98.2 °F (36.8 °C) 97.5 °F (36.4 °C)    TempSrc: Temporal Temporal Temporal    SpO2: 96% 99% 99%    Weight:       Height:               Intake/Output Summary (Last 24 hours) at 2022 0844  Last data filed at 2022 0443  Gross per 24 hour   Intake 480 ml   Output 1150 ml   Net -670 ml           No results for input(s): WBC, HGB, HCT, PLT in the last 72 hours. Recent Labs     12/10/22  1152 12/11/22  0644   BUN 14 12   CREATININE 0.6 0.5           Creatinine   Date/Time Value Ref Range Status   12/11/2022 06:44 AM 0.5 0.5 - 1.0 mg/dL Final   12/10/2022 11:52 AM 0.6 0.5 - 1.0 mg/dL Final   12/08/2022 08:03 AM 0.5 0.5 - 1.0 mg/dL Final         ROS:   Negative for CP, palpitations, SOB at rest, dizziness/lightheadedness. Physical Exam   Constitutional: Oriented to person, place, and time. Appears well-developed. No distress. Cardiovascular: Normal rate, regular rhythm and normal heart sounds. Pulmonary/Chest: Effort normal. No respiratory distress. Abdominal: Soft. Bowel sounds are normal.   Musculoskeletal: Normal range of motion. Neurological: alert and oriented to person, place, and time. Skin: Skin is warm and dry. Psychiatric: normal mood and affect.        ASSESSMENT:  Patient Active Problem List   Diagnosis    Benign essential hypertension    HLD (hyperlipidemia)    Poorly controlled type 2 diabetes mellitus ([facility] [address]    2019 novel coronavirus disease (COVID-19)    Coronary artery disease involving native coronary artery of native heart without angina pectoris    NSTEMI (non-ST elevated myocardial infarction) ([facility] [address]    History of right coronary artery stent placement    RBBB (right bundle branch block)    CAD, multiple vessel    CAD in native artery    Dietary noncompliance       NSTEMI, MVCAD      PLAN:  --Pre-operative testing completed  --Consult endocrinology for diabetes management - hgb a1C 12.1 - refusing insulin  --OR Monday for CABG   --NO ACEi, ARB or Entresto   --Half dose Toprol AM of surgery           Pre-operative testing review:   --carotids with no significant stenosis  --fam with good flow bilaterally  --ct chest reviewed  --TTE with no significant valvular abnormalities, EF 45-50%  --pft with FEV1 87

## 2022-12-12 ENCOUNTER — ANESTHESIA (OUTPATIENT)
Dept: OPERATING ROOM | Age: 62
DRG: 233 | End: 2022-12-12

## 2022-12-12 ENCOUNTER — APPOINTMENT (OUTPATIENT)
Dept: GENERAL RADIOLOGY | Age: 62
DRG: 233 | End: 2022-12-12
Attending: INTERNAL MEDICINE

## 2022-12-12 DIAGNOSIS — R91.1 PULMONARY NODULE: Primary | ICD-10-CM

## 2022-12-12 PROBLEM — I95.81 POSTOPERATIVE HYPOTENSION: Status: ACTIVE | Noted: 2022-12-12

## 2022-12-12 PROBLEM — R73.9 HYPERGLYCEMIA: Status: ACTIVE | Noted: 2022-12-12

## 2022-12-12 PROBLEM — G89.18 ACUTE POSTOPERATIVE PAIN: Status: ACTIVE | Noted: 2022-12-12

## 2022-12-12 PROBLEM — T81.9XXA COMPLICATION OF SURGICAL PROCEDURE: Status: ACTIVE | Noted: 2022-12-12

## 2022-12-12 LAB
AADO2: 187.6 MMHG
AADO2: 202.3 MMHG
ACTIVATED CLOTTING TIME: 109 SECONDS (ref 99–130)
ACTIVATED CLOTTING TIME: 119 SECONDS (ref 99–130)
ACTIVATED CLOTTING TIME: 462 SECONDS (ref 99–130)
ACTIVATED CLOTTING TIME: 481 SECONDS (ref 99–130)
ACTIVATED CLOTTING TIME: 548 SECONDS (ref 99–130)
ANION GAP SERPL CALCULATED.3IONS-SCNC: 11 MMOL/L (ref 7–16)
ANION GAP SERPL CALCULATED.3IONS-SCNC: 13 MMOL/L (ref 7–16)
ANION GAP: 12 MMOL/L (ref 7–16)
ANION GAP: 12 MMOL/L (ref 7–16)
ANION GAP: 14 MMOL/L (ref 7–16)
ANION GAP: 9 MMOL/L (ref 7–16)
APTT: 24.9 SEC (ref 24.5–35.1)
B.E.: -2.6 MMOL/L (ref -3–3)
B.E.: -3.3 MMOL/L (ref -3–3)
B.E.: -3.7 MMOL/L (ref -3–3)
B.E.: -5.6 MMOL/L (ref -3–3)
B.E.: -6.2 MMOL/L (ref -3–3)
B.E.: -6.4 MMOL/L (ref -3–3)
B.E.: -7.7 MMOL/L (ref -3–3)
BUN BLDV-MCNC: 11 MG/DL (ref 6–23)
BUN BLDV-MCNC: 9 MG/DL (ref 6–23)
CALCIUM IONIZED: 1.37 MMOL/L (ref 1.15–1.33)
CALCIUM SERPL-MCNC: 9.4 MG/DL (ref 8.6–10.2)
CALCIUM SERPL-MCNC: 9.9 MG/DL (ref 8.6–10.2)
CARDIOPULMONARY BYPASS: NO
CARDIOPULMONARY BYPASS: NO
CARDIOPULMONARY BYPASS: YES
CARDIOPULMONARY BYPASS: YES
CHLORIDE BLD-SCNC: 102 MMOL/L (ref 98–107)
CHLORIDE BLD-SCNC: 107 MMOL/L (ref 98–107)
CO2: 20 MMOL/L (ref 22–29)
CO2: 20 MMOL/L (ref 22–29)
COHB: 0.3 % (ref 0–1.5)
COHB: 0.3 % (ref 0–1.5)
COHB: 0.5 % (ref 0–1.5)
COMMENT: ABNORMAL
CREAT SERPL-MCNC: 0.5 MG/DL (ref 0.5–1)
CREAT SERPL-MCNC: 0.5 MG/DL (ref 0.5–1)
CRITICAL: ABNORMAL
DATE ANALYZED: ABNORMAL
DATE OF COLLECTION: ABNORMAL
DEVICE: ABNORMAL
FIO2: 50 %
FIO2: 50 %
GFR SERPL CREATININE-BSD FRML MDRD: >60 ML/MIN/1.73
GFR SERPL CREATININE-BSD FRML MDRD: >60 ML/MIN/1.73
GFR, ESTIMATED: >60 ML/MIN/1.73
GLUCOSE BLD-MCNC: 209 MG/DL (ref 74–99)
GLUCOSE BLD-MCNC: 228 MG/DL (ref 74–99)
GLUCOSE BLD-MCNC: 252 MG/DL (ref 74–99)
GLUCOSE BLD-MCNC: 260 MG/DL (ref 74–99)
GLUCOSE BLD-MCNC: 279 MG/DL (ref 74–99)
GLUCOSE BLD-MCNC: 285 MG/DL (ref 74–99)
HCO3: 17.6 MMOL/L (ref 22–26)
HCO3: 17.9 MMOL/L (ref 22–26)
HCO3: 18.7 MMOL/L (ref 22–26)
HCO3: 19.1 MMOL/L (ref 22–26)
HCO3: 21.4 MMOL/L (ref 22–26)
HCO3: 21.6 MMOL/L (ref 22–26)
HCO3: 23.2 MMOL/L (ref 22–26)
HCT VFR BLD CALC: 29.2 % (ref 34–48)
HEMATOCRIT: 27 % (ref 34–48)
HEMATOCRIT: 27 % (ref 34–48)
HEMATOCRIT: 28 % (ref 34–48)
HEMATOCRIT: 35 % (ref 34–48)
HEMOGLOBIN: 11.8 G/DL (ref 11.5–15.5)
HEMOGLOBIN: 9 G/DL (ref 11.5–15.5)
HEMOGLOBIN: 9.3 G/DL (ref 11.5–15.5)
HEMOGLOBIN: 9.4 G/DL (ref 11.5–15.5)
HEMOGLOBIN: 9.6 G/DL (ref 11.5–15.5)
HHB: 2.7 % (ref 0–5)
HHB: 2.8 % (ref 0–5)
HHB: 5.1 % (ref 0–5)
INR BLD: 1.3
LAB: ABNORMAL
MAGNESIUM: 2.3 MG/DL (ref 1.6–2.6)
MCH RBC QN AUTO: 26.3 PG (ref 26–35)
MCHC RBC AUTO-ENTMCNC: 32.2 % (ref 32–34.5)
MCV RBC AUTO: 81.6 FL (ref 80–99.9)
METER GLUCOSE: 101 MG/DL (ref 74–99)
METER GLUCOSE: 112 MG/DL (ref 74–99)
METER GLUCOSE: 131 MG/DL (ref 74–99)
METER GLUCOSE: 138 MG/DL (ref 74–99)
METER GLUCOSE: 147 MG/DL (ref 74–99)
METER GLUCOSE: 149 MG/DL (ref 74–99)
METER GLUCOSE: 152 MG/DL (ref 74–99)
METER GLUCOSE: 162 MG/DL (ref 74–99)
METER GLUCOSE: 179 MG/DL (ref 74–99)
METER GLUCOSE: 183 MG/DL (ref 74–99)
METER GLUCOSE: 189 MG/DL (ref 74–99)
METER GLUCOSE: 206 MG/DL (ref 74–99)
METER GLUCOSE: 282 MG/DL (ref 74–99)
METER GLUCOSE: 282 MG/DL (ref 74–99)
METHB: 0.2 % (ref 0–1.5)
METHB: 0.3 % (ref 0–1.5)
METHB: 0.3 % (ref 0–1.5)
MODE: ABNORMAL
MODE: ABNORMAL
MODE: AC
O2 SATURATION: 100 % (ref 92–98.5)
O2 SATURATION: 100 % (ref 92–98.5)
O2 SATURATION: 79.7 % (ref 92–98.5)
O2 SATURATION: 94.9 % (ref 92–98.5)
O2 SATURATION: 97.2 % (ref 92–98.5)
O2 SATURATION: 97.3 % (ref 92–98.5)
O2 SATURATION: 97.3 % (ref 92–98.5)
O2HB: 94.3 % (ref 94–97)
O2HB: 96.5 % (ref 94–97)
O2HB: 96.7 % (ref 94–97)
OPERATOR ID: 187
OPERATOR ID: 7874
OPERATOR ID: 7874
OPERATOR ID: ABNORMAL
PATIENT TEMP: 37 C
PCO2 37: 33.7 MMHG (ref 35–45)
PCO2 37: 34.2 MMHG (ref 35–45)
PCO2 37: 37.7 MMHG (ref 35–45)
PCO2 37: 46.7 MMHG (ref 35–45)
PCO2: 27.5 MMHG (ref 35–45)
PCO2: 36.2 MMHG (ref 35–45)
PCO2: 37.6 MMHG (ref 35–45)
PDW BLD-RTO: 13.7 FL (ref 11.5–15)
PEEP/CPAP: 5 CMH2O
PEEP/CPAP: 5 CMH2O
PFO2: 2.22 MMHG/%
PFO2: 2.28 MMHG/%
PH 37: 7.3 (ref 7.35–7.45)
PH 37: 7.35 (ref 7.35–7.45)
PH 37: 7.36 (ref 7.35–7.45)
PH 37: 7.41 (ref 7.35–7.45)
PH BLOOD GAS: 7.31 (ref 7.35–7.45)
PH BLOOD GAS: 7.32 (ref 7.35–7.45)
PH BLOOD GAS: 7.42 (ref 7.35–7.45)
PLATELET # BLD: 149 E9/L (ref 130–450)
PMV BLD AUTO: 10.3 FL (ref 7–12)
PO2 37: 48.7 MMHG (ref 60–80)
PO2 37: 483.9 MMHG (ref 60–80)
PO2 37: 552.8 MMHG (ref 60–80)
PO2 37: 97.4 MMHG (ref 60–80)
PO2: 110.8 MMHG (ref 75–100)
PO2: 114.1 MMHG (ref 75–100)
PO2: 84.4 MMHG (ref 75–100)
POC BUN: 7 MG/DL (ref 8–23)
POC BUN: 9 MG/DL (ref 8–23)
POC CHLORIDE: 100 MMOL/L (ref 100–108)
POC CHLORIDE: 102 MMOL/L (ref 100–108)
POC CHLORIDE: 105 MMOL/L (ref 100–108)
POC CHLORIDE: 106 MMOL/L (ref 100–108)
POC CO2: 17.9 MMOL/L (ref 22–29)
POC CO2: 20.5 MMOL/L (ref 22–29)
POC CO2: 20.7 MMOL/L (ref 22–29)
POC CO2: 22.5 MMOL/L (ref 22–29)
POC CREATININE: 0.5 MG/DL (ref 0.5–1)
POC CREATININE: 0.6 MG/DL (ref 0.5–1)
POC IONIZED CALCIUM: 1.1 (ref 1.1–1.3)
POC IONIZED CALCIUM: 1.1 (ref 1.1–1.3)
POC IONIZED CALCIUM: 1.2 (ref 1.1–1.3)
POC IONIZED CALCIUM: 1.4 (ref 1.1–1.3)
POC LACTIC ACID: 1.2 (ref 0.5–2.2)
POC LACTIC ACID: 1.2 (ref 0.5–2.2)
POC LACTIC ACID: 1.6 (ref 0.5–2.2)
POC LACTIC ACID: 2.4 (ref 0.5–2.2)
POC SODIUM: 132 MMOL/L (ref 132–146)
POC SODIUM: 135 MMOL/L (ref 132–146)
POC SODIUM: 136 MMOL/L (ref 132–146)
POC SODIUM: 138 MMOL/L (ref 132–146)
POC SOURCE: ABNORMAL
POTASSIUM REFLEX MAGNESIUM: 4.2 MMOL/L (ref 3.5–5)
POTASSIUM SERPL-SCNC: 3.3 MMOL/L (ref 3.5–5.5)
POTASSIUM SERPL-SCNC: 3.7 MMOL/L (ref 3.5–5)
POTASSIUM SERPL-SCNC: 3.9 MMOL/L (ref 3.5–5)
POTASSIUM SERPL-SCNC: 4.1 MMOL/L (ref 3.5–5.5)
POTASSIUM SERPL-SCNC: 4.2 MMOL/L (ref 3.5–5)
POTASSIUM SERPL-SCNC: 4.2 MMOL/L (ref 3.5–5.5)
POTASSIUM SERPL-SCNC: 4.2 MMOL/L (ref 3.5–5.5)
PROTHROMBIN TIME: 14.4 SEC (ref 9.3–12.4)
PS: 8 CMH20
RBC # BLD: 3.58 E12/L (ref 3.5–5.5)
RI(T): 1.64
RI(T): 1.83
RR MECHANICAL: 12 B/MIN
SODIUM BLD-SCNC: 135 MMOL/L (ref 132–146)
SODIUM BLD-SCNC: 138 MMOL/L (ref 132–146)
SOURCE, BLOOD GAS: ABNORMAL
THB: 10.4 G/DL (ref 11.5–16.5)
THB: 10.9 G/DL (ref 11.5–16.5)
THB: 9.9 G/DL (ref 11.5–16.5)
TIME ANALYZED: 1140
TIME ANALYZED: 1241
TIME ANALYZED: 1409
VT MECHANICAL: 450 ML
WBC # BLD: 9.7 E9/L (ref 4.5–11.5)

## 2022-12-12 PROCEDURE — 82803 BLOOD GASES ANY COMBINATION: CPT

## 2022-12-12 PROCEDURE — 3600000018 HC SURGERY OHS ADDTL 15MIN: Performed by: THORACIC SURGERY (CARDIOTHORACIC VASCULAR SURGERY)

## 2022-12-12 PROCEDURE — 85610 PROTHROMBIN TIME: CPT

## 2022-12-12 PROCEDURE — 6370000000 HC RX 637 (ALT 250 FOR IP)

## 2022-12-12 PROCEDURE — 02HV33Z INSERTION OF INFUSION DEVICE INTO SUPERIOR VENA CAVA, PERCUTANEOUS APPROACH: ICD-10-PCS | Performed by: THORACIC SURGERY (CARDIOTHORACIC VASCULAR SURGERY)

## 2022-12-12 PROCEDURE — 2700000000 HC OXYGEN THERAPY PER DAY

## 2022-12-12 PROCEDURE — A4216 STERILE WATER/SALINE, 10 ML: HCPCS | Performed by: PHYSICIAN ASSISTANT

## 2022-12-12 PROCEDURE — B24BZZ4 ULTRASONOGRAPHY OF HEART WITH AORTA, TRANSESOPHAGEAL: ICD-10-PCS | Performed by: THORACIC SURGERY (CARDIOTHORACIC VASCULAR SURGERY)

## 2022-12-12 PROCEDURE — A4217 STERILE WATER/SALINE, 500 ML: HCPCS | Performed by: THORACIC SURGERY (CARDIOTHORACIC VASCULAR SURGERY)

## 2022-12-12 PROCEDURE — 82962 GLUCOSE BLOOD TEST: CPT

## 2022-12-12 PROCEDURE — 85347 COAGULATION TIME ACTIVATED: CPT

## 2022-12-12 PROCEDURE — 36415 COLL VENOUS BLD VENIPUNCTURE: CPT

## 2022-12-12 PROCEDURE — 2580000003 HC RX 258: Performed by: THORACIC SURGERY (CARDIOTHORACIC VASCULAR SURGERY)

## 2022-12-12 PROCEDURE — 37799 UNLISTED PX VASCULAR SURGERY: CPT

## 2022-12-12 PROCEDURE — 2500000003 HC RX 250 WO HCPCS: Performed by: NURSE ANESTHETIST, CERTIFIED REGISTERED

## 2022-12-12 PROCEDURE — 94640 AIRWAY INHALATION TREATMENT: CPT

## 2022-12-12 PROCEDURE — 6360000002 HC RX W HCPCS: Performed by: PHYSICIAN ASSISTANT

## 2022-12-12 PROCEDURE — 0210499 BYPASS CORONARY ARTERY, ONE ARTERY FROM LEFT INTERNAL MAMMARY WITH AUTOLOGOUS VENOUS TISSUE, PERCUTANEOUS ENDOSCOPIC APPROACH: ICD-10-PCS | Performed by: THORACIC SURGERY (CARDIOTHORACIC VASCULAR SURGERY)

## 2022-12-12 PROCEDURE — 82330 ASSAY OF CALCIUM: CPT

## 2022-12-12 PROCEDURE — 06BQ4ZZ EXCISION OF LEFT SAPHENOUS VEIN, PERCUTANEOUS ENDOSCOPIC APPROACH: ICD-10-PCS | Performed by: THORACIC SURGERY (CARDIOTHORACIC VASCULAR SURGERY)

## 2022-12-12 PROCEDURE — 3700000001 HC ADD 15 MINUTES (ANESTHESIA): Performed by: THORACIC SURGERY (CARDIOTHORACIC VASCULAR SURGERY)

## 2022-12-12 PROCEDURE — C9113 INJ PANTOPRAZOLE SODIUM, VIA: HCPCS | Performed by: PHYSICIAN ASSISTANT

## 2022-12-12 PROCEDURE — 6360000002 HC RX W HCPCS: Performed by: NURSE ANESTHETIST, CERTIFIED REGISTERED

## 2022-12-12 PROCEDURE — 7100000001 HC PACU RECOVERY - ADDTL 15 MIN

## 2022-12-12 PROCEDURE — 6370000000 HC RX 637 (ALT 250 FOR IP): Performed by: PHYSICIAN ASSISTANT

## 2022-12-12 PROCEDURE — 2000000000 HC ICU R&B

## 2022-12-12 PROCEDURE — 3600000008 HC SURGERY OHS BASE: Performed by: THORACIC SURGERY (CARDIOTHORACIC VASCULAR SURGERY)

## 2022-12-12 PROCEDURE — 85027 COMPLETE CBC AUTOMATED: CPT

## 2022-12-12 PROCEDURE — 84132 ASSAY OF SERUM POTASSIUM: CPT

## 2022-12-12 PROCEDURE — 82805 BLOOD GASES W/O2 SATURATION: CPT

## 2022-12-12 PROCEDURE — P9041 ALBUMIN (HUMAN),5%, 50ML: HCPCS | Performed by: NURSE ANESTHETIST, CERTIFIED REGISTERED

## 2022-12-12 PROCEDURE — 6360000002 HC RX W HCPCS

## 2022-12-12 PROCEDURE — 2500000003 HC RX 250 WO HCPCS: Performed by: NURSE PRACTITIONER

## 2022-12-12 PROCEDURE — 2709999900 HC NON-CHARGEABLE SUPPLY: Performed by: THORACIC SURGERY (CARDIOTHORACIC VASCULAR SURGERY)

## 2022-12-12 PROCEDURE — P9045 ALBUMIN (HUMAN), 5%, 250 ML: HCPCS | Performed by: PHYSICIAN ASSISTANT

## 2022-12-12 PROCEDURE — 71045 X-RAY EXAM CHEST 1 VIEW: CPT

## 2022-12-12 PROCEDURE — 85730 THROMBOPLASTIN TIME PARTIAL: CPT

## 2022-12-12 PROCEDURE — 94664 DEMO&/EVAL PT USE INHALER: CPT

## 2022-12-12 PROCEDURE — 36556 INSERT NON-TUNNEL CV CATH: CPT

## 2022-12-12 PROCEDURE — 3700000000 HC ANESTHESIA ATTENDED CARE: Performed by: THORACIC SURGERY (CARDIOTHORACIC VASCULAR SURGERY)

## 2022-12-12 PROCEDURE — P9045 ALBUMIN (HUMAN), 5%, 250 ML: HCPCS

## 2022-12-12 PROCEDURE — 2580000003 HC RX 258: Performed by: PHYSICIAN ASSISTANT

## 2022-12-12 PROCEDURE — 7100000000 HC PACU RECOVERY - FIRST 15 MIN

## 2022-12-12 PROCEDURE — 021149W BYPASS CORONARY ARTERY, TWO ARTERIES FROM AORTA WITH AUTOLOGOUS VENOUS TISSUE, PERCUTANEOUS ENDOSCOPIC APPROACH: ICD-10-PCS | Performed by: THORACIC SURGERY (CARDIOTHORACIC VASCULAR SURGERY)

## 2022-12-12 PROCEDURE — C1713 ANCHOR/SCREW BN/BN,TIS/BN: HCPCS | Performed by: THORACIC SURGERY (CARDIOTHORACIC VASCULAR SURGERY)

## 2022-12-12 PROCEDURE — 2580000003 HC RX 258: Performed by: NURSE ANESTHETIST, CERTIFIED REGISTERED

## 2022-12-12 PROCEDURE — 02L73CK OCCLUSION OF LEFT ATRIAL APPENDAGE WITH EXTRALUMINAL DEVICE, PERCUTANEOUS APPROACH: ICD-10-PCS | Performed by: THORACIC SURGERY (CARDIOTHORACIC VASCULAR SURGERY)

## 2022-12-12 PROCEDURE — C1729 CATH, DRAINAGE: HCPCS | Performed by: THORACIC SURGERY (CARDIOTHORACIC VASCULAR SURGERY)

## 2022-12-12 PROCEDURE — 80048 BASIC METABOLIC PNL TOTAL CA: CPT

## 2022-12-12 PROCEDURE — 6360000002 HC RX W HCPCS: Performed by: THORACIC SURGERY (CARDIOTHORACIC VASCULAR SURGERY)

## 2022-12-12 PROCEDURE — 2500000003 HC RX 250 WO HCPCS: Performed by: PHYSICIAN ASSISTANT

## 2022-12-12 PROCEDURE — 94002 VENT MGMT INPAT INIT DAY: CPT

## 2022-12-12 PROCEDURE — 83735 ASSAY OF MAGNESIUM: CPT

## 2022-12-12 PROCEDURE — 6370000000 HC RX 637 (ALT 250 FOR IP): Performed by: NURSE ANESTHETIST, CERTIFIED REGISTERED

## 2022-12-12 PROCEDURE — 2720000010 HC SURG SUPPLY STERILE: Performed by: THORACIC SURGERY (CARDIOTHORACIC VASCULAR SURGERY)

## 2022-12-12 DEVICE — STERNALPLATE, BOX: Type: IMPLANTABLE DEVICE | Status: FUNCTIONAL

## 2022-12-12 DEVICE — DEVICE OCCL CLP L35MM PLUNG GRP FLX SHFT FOR GILLINOV: Type: IMPLANTABLE DEVICE | Status: FUNCTIONAL

## 2022-12-12 DEVICE — STERNALPLATE, X: Type: IMPLANTABLE DEVICE | Status: FUNCTIONAL

## 2022-12-12 DEVICE — LOCKING SCREW,AXS,SELF-DRILLING
Type: IMPLANTABLE DEVICE | Status: FUNCTIONAL
Brand: AXS, SMARTLOCK

## 2022-12-12 RX ORDER — SODIUM CHLORIDE 9 MG/ML
INJECTION, SOLUTION INTRAVENOUS PRN
Status: DISCONTINUED | OUTPATIENT
Start: 2022-12-12 | End: 2022-12-14

## 2022-12-12 RX ORDER — ONDANSETRON 2 MG/ML
4 INJECTION INTRAMUSCULAR; INTRAVENOUS EVERY 6 HOURS PRN
Status: DISCONTINUED | OUTPATIENT
Start: 2022-12-12 | End: 2022-12-12 | Stop reason: ALTCHOICE

## 2022-12-12 RX ORDER — GLYCOPYRROLATE 1 MG/5 ML
SYRINGE (ML) INTRAVENOUS PRN
Status: DISCONTINUED | OUTPATIENT
Start: 2022-12-12 | End: 2022-12-12 | Stop reason: SDUPTHER

## 2022-12-12 RX ORDER — AMIODARONE HYDROCHLORIDE 200 MG/1
400 TABLET ORAL PRN
Status: DISCONTINUED | OUTPATIENT
Start: 2022-12-12 | End: 2022-12-14

## 2022-12-12 RX ORDER — SODIUM CHLORIDE, SODIUM LACTATE, POTASSIUM CHLORIDE, CALCIUM CHLORIDE 600; 310; 30; 20 MG/100ML; MG/100ML; MG/100ML; MG/100ML
INJECTION, SOLUTION INTRAVENOUS CONTINUOUS PRN
Status: DISCONTINUED | OUTPATIENT
Start: 2022-12-12 | End: 2022-12-12 | Stop reason: SDUPTHER

## 2022-12-12 RX ORDER — EZETIMIBE 10 MG/1
10 TABLET ORAL NIGHTLY
Status: DISCONTINUED | OUTPATIENT
Start: 2022-12-12 | End: 2022-12-15 | Stop reason: HOSPADM

## 2022-12-12 RX ORDER — ALBUMIN, HUMAN INJ 5% 5 %
25 SOLUTION INTRAVENOUS ONCE
Status: DISCONTINUED | OUTPATIENT
Start: 2022-12-12 | End: 2022-12-13

## 2022-12-12 RX ORDER — LANOLIN ALCOHOL/MO/W.PET/CERES
400 CREAM (GRAM) TOPICAL 2 TIMES DAILY
Status: DISCONTINUED | OUTPATIENT
Start: 2022-12-13 | End: 2022-12-15 | Stop reason: HOSPADM

## 2022-12-12 RX ORDER — MAGNESIUM SULFATE IN WATER 40 MG/ML
2000 INJECTION, SOLUTION INTRAVENOUS PRN
Status: DISCONTINUED | OUTPATIENT
Start: 2022-12-12 | End: 2022-12-14

## 2022-12-12 RX ORDER — ASPIRIN 81 MG/1
81 TABLET ORAL DAILY
Status: DISCONTINUED | OUTPATIENT
Start: 2022-12-13 | End: 2022-12-14

## 2022-12-12 RX ORDER — CEFAZOLIN SODIUM 1 G/3ML
INJECTION, POWDER, FOR SOLUTION INTRAMUSCULAR; INTRAVENOUS PRN
Status: DISCONTINUED | OUTPATIENT
Start: 2022-12-12 | End: 2022-12-12 | Stop reason: SDUPTHER

## 2022-12-12 RX ORDER — ASPIRIN 81 MG/1
81 TABLET, CHEWABLE ORAL ONCE
Status: COMPLETED | OUTPATIENT
Start: 2022-12-12 | End: 2022-12-12

## 2022-12-12 RX ORDER — PROPOFOL 10 MG/ML
INJECTION, EMULSION INTRAVENOUS
Status: DISPENSED
Start: 2022-12-12 | End: 2022-12-12

## 2022-12-12 RX ORDER — FENTANYL CITRATE 50 UG/ML
50 INJECTION, SOLUTION INTRAMUSCULAR; INTRAVENOUS
Status: DISCONTINUED | OUTPATIENT
Start: 2022-12-12 | End: 2022-12-13

## 2022-12-12 RX ORDER — SODIUM CHLORIDE 0.9 % (FLUSH) 0.9 %
5-40 SYRINGE (ML) INJECTION PRN
Status: DISCONTINUED | OUTPATIENT
Start: 2022-12-12 | End: 2022-12-15 | Stop reason: HOSPADM

## 2022-12-12 RX ORDER — ACETAMINOPHEN 325 MG/1
650 TABLET ORAL EVERY 4 HOURS PRN
Status: DISCONTINUED | OUTPATIENT
Start: 2022-12-12 | End: 2022-12-13 | Stop reason: ALTCHOICE

## 2022-12-12 RX ORDER — POTASSIUM CHLORIDE 7.45 MG/ML
INJECTION INTRAVENOUS PRN
Status: DISCONTINUED | OUTPATIENT
Start: 2022-12-12 | End: 2022-12-12 | Stop reason: SDUPTHER

## 2022-12-12 RX ORDER — INSULIN LISPRO 100 [IU]/ML
0-16 INJECTION, SOLUTION INTRAVENOUS; SUBCUTANEOUS EVERY 4 HOURS
Status: DISCONTINUED | OUTPATIENT
Start: 2022-12-12 | End: 2022-12-13

## 2022-12-12 RX ORDER — LIDOCAINE HYDROCHLORIDE 20 MG/ML
INJECTION, SOLUTION INTRAVENOUS PRN
Status: DISCONTINUED | OUTPATIENT
Start: 2022-12-12 | End: 2022-12-12 | Stop reason: SDUPTHER

## 2022-12-12 RX ORDER — AMINOCAPROIC ACID 250 MG/ML
INJECTION, SOLUTION INTRAVENOUS PRN
Status: DISCONTINUED | OUTPATIENT
Start: 2022-12-12 | End: 2022-12-12 | Stop reason: SDUPTHER

## 2022-12-12 RX ORDER — SODIUM CHLORIDE 0.9 % (FLUSH) 0.9 %
5-40 SYRINGE (ML) INJECTION EVERY 12 HOURS SCHEDULED
Status: DISCONTINUED | OUTPATIENT
Start: 2022-12-12 | End: 2022-12-15 | Stop reason: HOSPADM

## 2022-12-12 RX ORDER — EPHEDRINE SULFATE/0.9% NACL/PF 50 MG/5 ML
SYRINGE (ML) INTRAVENOUS PRN
Status: DISCONTINUED | OUTPATIENT
Start: 2022-12-12 | End: 2022-12-12 | Stop reason: SDUPTHER

## 2022-12-12 RX ORDER — ALBUMIN, HUMAN INJ 5% 5 %
SOLUTION INTRAVENOUS PRN
Status: DISCONTINUED | OUTPATIENT
Start: 2022-12-12 | End: 2022-12-12 | Stop reason: SDUPTHER

## 2022-12-12 RX ORDER — PROTAMINE SULFATE 10 MG/ML
INJECTION, SOLUTION INTRAVENOUS PRN
Status: DISCONTINUED | OUTPATIENT
Start: 2022-12-12 | End: 2022-12-12 | Stop reason: SDUPTHER

## 2022-12-12 RX ORDER — 0.9 % SODIUM CHLORIDE 0.9 %
250 INTRAVENOUS SOLUTION INTRAVENOUS CONTINUOUS PRN
Status: DISCONTINUED | OUTPATIENT
Start: 2022-12-12 | End: 2022-12-14

## 2022-12-12 RX ORDER — FENTANYL CITRATE 0.05 MG/ML
INJECTION, SOLUTION INTRAMUSCULAR; INTRAVENOUS PRN
Status: DISCONTINUED | OUTPATIENT
Start: 2022-12-12 | End: 2022-12-12 | Stop reason: SDUPTHER

## 2022-12-12 RX ORDER — CHLORHEXIDINE GLUCONATE 0.12 MG/ML
15 RINSE ORAL 2 TIMES DAILY
Status: DISCONTINUED | OUTPATIENT
Start: 2022-12-12 | End: 2022-12-13

## 2022-12-12 RX ORDER — POTASSIUM CHLORIDE 29.8 MG/ML
20 INJECTION INTRAVENOUS PRN
Status: DISCONTINUED | OUTPATIENT
Start: 2022-12-12 | End: 2022-12-14

## 2022-12-12 RX ORDER — PROPOFOL 10 MG/ML
10 INJECTION, EMULSION INTRAVENOUS CONTINUOUS PRN
Status: DISCONTINUED | OUTPATIENT
Start: 2022-12-12 | End: 2022-12-13

## 2022-12-12 RX ORDER — SODIUM CHLORIDE 9 MG/ML
INJECTION, SOLUTION INTRAVENOUS CONTINUOUS
Status: DISCONTINUED | OUTPATIENT
Start: 2022-12-12 | End: 2022-12-14

## 2022-12-12 RX ORDER — ALBUMIN, HUMAN INJ 5% 5 %
SOLUTION INTRAVENOUS
Status: COMPLETED
Start: 2022-12-12 | End: 2022-12-12

## 2022-12-12 RX ORDER — IPRATROPIUM BROMIDE AND ALBUTEROL SULFATE 2.5; .5 MG/3ML; MG/3ML
1 SOLUTION RESPIRATORY (INHALATION)
Status: DISCONTINUED | OUTPATIENT
Start: 2022-12-12 | End: 2022-12-15 | Stop reason: HOSPADM

## 2022-12-12 RX ORDER — HEPARIN SODIUM 10000 [USP'U]/ML
INJECTION, SOLUTION INTRAVENOUS; SUBCUTANEOUS PRN
Status: DISCONTINUED | OUTPATIENT
Start: 2022-12-12 | End: 2022-12-12 | Stop reason: SDUPTHER

## 2022-12-12 RX ORDER — OXYCODONE HYDROCHLORIDE 10 MG/1
10 TABLET ORAL EVERY 4 HOURS PRN
Status: DISCONTINUED | OUTPATIENT
Start: 2022-12-12 | End: 2022-12-14

## 2022-12-12 RX ORDER — DEXTROSE MONOHYDRATE 100 MG/ML
INJECTION, SOLUTION INTRAVENOUS CONTINUOUS PRN
Status: DISCONTINUED | OUTPATIENT
Start: 2022-12-12 | End: 2022-12-15 | Stop reason: HOSPADM

## 2022-12-12 RX ORDER — FENTANYL CITRATE 50 UG/ML
25 INJECTION, SOLUTION INTRAMUSCULAR; INTRAVENOUS
Status: DISCONTINUED | OUTPATIENT
Start: 2022-12-12 | End: 2022-12-13

## 2022-12-12 RX ORDER — NEOSTIGMINE METHYLSULFATE 1 MG/ML
INJECTION, SOLUTION INTRAVENOUS PRN
Status: DISCONTINUED | OUTPATIENT
Start: 2022-12-12 | End: 2022-12-12 | Stop reason: SDUPTHER

## 2022-12-12 RX ORDER — SENNA AND DOCUSATE SODIUM 50; 8.6 MG/1; MG/1
1 TABLET, FILM COATED ORAL 2 TIMES DAILY
Status: DISCONTINUED | OUTPATIENT
Start: 2022-12-12 | End: 2022-12-14

## 2022-12-12 RX ORDER — ACETAMINOPHEN 650 MG/1
650 SUPPOSITORY RECTAL EVERY 4 HOURS PRN
Status: DISCONTINUED | OUTPATIENT
Start: 2022-12-12 | End: 2022-12-14

## 2022-12-12 RX ORDER — BISACODYL 10 MG
10 SUPPOSITORY, RECTAL RECTAL DAILY PRN
Status: DISCONTINUED | OUTPATIENT
Start: 2022-12-13 | End: 2022-12-14

## 2022-12-12 RX ORDER — OXYCODONE HYDROCHLORIDE 5 MG/1
5 TABLET ORAL EVERY 4 HOURS PRN
Status: DISCONTINUED | OUTPATIENT
Start: 2022-12-12 | End: 2022-12-14

## 2022-12-12 RX ORDER — ALBUMIN, HUMAN INJ 5% 5 %
25 SOLUTION INTRAVENOUS PRN
Status: DISCONTINUED | OUTPATIENT
Start: 2022-12-12 | End: 2022-12-14

## 2022-12-12 RX ORDER — CLOPIDOGREL BISULFATE 75 MG/1
75 TABLET ORAL DAILY
Status: DISCONTINUED | OUTPATIENT
Start: 2022-12-13 | End: 2022-12-15 | Stop reason: HOSPADM

## 2022-12-12 RX ORDER — VECURONIUM BROMIDE 1 MG/ML
INJECTION, POWDER, LYOPHILIZED, FOR SOLUTION INTRAVENOUS PRN
Status: DISCONTINUED | OUTPATIENT
Start: 2022-12-12 | End: 2022-12-12 | Stop reason: SDUPTHER

## 2022-12-12 RX ORDER — PANTOPRAZOLE SODIUM 40 MG/1
40 TABLET, DELAYED RELEASE ORAL DAILY
Status: DISCONTINUED | OUTPATIENT
Start: 2022-12-13 | End: 2022-12-14

## 2022-12-12 RX ORDER — INSULIN GLARGINE-YFGN 100 [IU]/ML
0.15 INJECTION, SOLUTION SUBCUTANEOUS NIGHTLY
Status: DISCONTINUED | OUTPATIENT
Start: 2022-12-13 | End: 2022-12-13

## 2022-12-12 RX ORDER — ONDANSETRON 4 MG/1
4 TABLET, ORALLY DISINTEGRATING ORAL EVERY 8 HOURS PRN
Status: DISCONTINUED | OUTPATIENT
Start: 2022-12-12 | End: 2022-12-12 | Stop reason: ALTCHOICE

## 2022-12-12 RX ORDER — PROPOFOL 10 MG/ML
INJECTION, EMULSION INTRAVENOUS PRN
Status: DISCONTINUED | OUTPATIENT
Start: 2022-12-12 | End: 2022-12-12 | Stop reason: SDUPTHER

## 2022-12-12 RX ORDER — SODIUM CHLORIDE 9 MG/ML
INJECTION, SOLUTION INTRAVENOUS CONTINUOUS PRN
Status: DISCONTINUED | OUTPATIENT
Start: 2022-12-12 | End: 2022-12-12 | Stop reason: SDUPTHER

## 2022-12-12 RX ORDER — MIDAZOLAM HYDROCHLORIDE 1 MG/ML
INJECTION INTRAMUSCULAR; INTRAVENOUS PRN
Status: DISCONTINUED | OUTPATIENT
Start: 2022-12-12 | End: 2022-12-12 | Stop reason: SDUPTHER

## 2022-12-12 RX ORDER — CALCIUM CHLORIDE 100 MG/ML
INJECTION INTRAVENOUS; INTRAVENTRICULAR PRN
Status: DISCONTINUED | OUTPATIENT
Start: 2022-12-12 | End: 2022-12-12 | Stop reason: SDUPTHER

## 2022-12-12 RX ORDER — MEPERIDINE HYDROCHLORIDE 25 MG/ML
25 INJECTION INTRAMUSCULAR; INTRAVENOUS; SUBCUTANEOUS
Status: DISCONTINUED | OUTPATIENT
Start: 2022-12-12 | End: 2022-12-13

## 2022-12-12 RX ADMIN — FENTANYL CITRATE 250 MCG: 50 INJECTION, SOLUTION INTRAMUSCULAR; INTRAVENOUS at 07:31

## 2022-12-12 RX ADMIN — PHENYLEPHRINE HYDROCHLORIDE 100 MCG: 10 INJECTION INTRAVENOUS at 07:12

## 2022-12-12 RX ADMIN — Medication 5 MG: at 07:16

## 2022-12-12 RX ADMIN — ALBUMIN (HUMAN) 12.5 G: 12.5 INJECTION, SOLUTION INTRAVENOUS at 11:05

## 2022-12-12 RX ADMIN — PROPOFOL 80 MG: 10 INJECTION, EMULSION INTRAVENOUS at 07:09

## 2022-12-12 RX ADMIN — INSULIN HUMAN 8 UNITS: 100 INJECTION, SOLUTION PARENTERAL at 08:51

## 2022-12-12 RX ADMIN — MIDAZOLAM 2 MG: 1 INJECTION INTRAMUSCULAR; INTRAVENOUS at 06:49

## 2022-12-12 RX ADMIN — FENTANYL CITRATE 250 MCG: 50 INJECTION, SOLUTION INTRAMUSCULAR; INTRAVENOUS at 07:35

## 2022-12-12 RX ADMIN — SODIUM CHLORIDE, POTASSIUM CHLORIDE, SODIUM LACTATE AND CALCIUM CHLORIDE: 600; 310; 30; 20 INJECTION, SOLUTION INTRAVENOUS at 08:44

## 2022-12-12 RX ADMIN — INSULIN HUMAN 10 UNITS: 100 INJECTION, SOLUTION PARENTERAL at 09:23

## 2022-12-12 RX ADMIN — IPRATROPIUM BROMIDE AND ALBUTEROL SULFATE 1 AMPULE: 2.5; .5 SOLUTION RESPIRATORY (INHALATION) at 12:07

## 2022-12-12 RX ADMIN — IPRATROPIUM BROMIDE AND ALBUTEROL SULFATE 1 AMPULE: 2.5; .5 SOLUTION RESPIRATORY (INHALATION) at 20:20

## 2022-12-12 RX ADMIN — ALBUMIN (HUMAN) 25 G: 12.5 INJECTION, SOLUTION INTRAVENOUS at 12:47

## 2022-12-12 RX ADMIN — IPRATROPIUM BROMIDE AND ALBUTEROL SULFATE 1 AMPULE: 2.5; .5 SOLUTION RESPIRATORY (INHALATION) at 16:04

## 2022-12-12 RX ADMIN — CALCIUM CHLORIDE 0.5 G: 100 INJECTION, SOLUTION INTRAVENOUS at 10:02

## 2022-12-12 RX ADMIN — FENTANYL CITRATE 25 MCG: 50 INJECTION, SOLUTION INTRAMUSCULAR; INTRAVENOUS at 11:31

## 2022-12-12 RX ADMIN — 0.12% CHLORHEXIDINE GLUCONATE 15 ML: 1.2 RINSE ORAL at 04:37

## 2022-12-12 RX ADMIN — TRIMETHOBENZAMIDE HYDROCHLORIDE 200 MG: 100 INJECTION INTRAMUSCULAR at 18:17

## 2022-12-12 RX ADMIN — SODIUM CHLORIDE 8 UNITS/HR: 9 INJECTION, SOLUTION INTRAVENOUS at 08:12

## 2022-12-12 RX ADMIN — MUPIROCIN: 20 OINTMENT TOPICAL at 20:26

## 2022-12-12 RX ADMIN — PHENYLEPHRINE HYDROCHLORIDE 100 MCG: 10 INJECTION INTRAVENOUS at 08:25

## 2022-12-12 RX ADMIN — Medication 0.6 MG: at 10:53

## 2022-12-12 RX ADMIN — SODIUM CHLORIDE 40 MG: 9 INJECTION, SOLUTION INTRAMUSCULAR; INTRAVENOUS; SUBCUTANEOUS at 12:42

## 2022-12-12 RX ADMIN — ALBUMIN (HUMAN) 12.5 G: 12.5 INJECTION, SOLUTION INTRAVENOUS at 11:16

## 2022-12-12 RX ADMIN — LIDOCAINE HYDROCHLORIDE 100 MG: 20 INJECTION, SOLUTION INTRAVENOUS at 07:09

## 2022-12-12 RX ADMIN — MUPIROCIN: 20 OINTMENT TOPICAL at 11:40

## 2022-12-12 RX ADMIN — MUPIROCIN: 20 OINTMENT TOPICAL at 04:36

## 2022-12-12 RX ADMIN — INSULIN HUMAN 8 UNITS: 100 INJECTION, SOLUTION PARENTERAL at 08:31

## 2022-12-12 RX ADMIN — HEPARIN SODIUM 30000 UNITS: 10000 INJECTION, SOLUTION INTRAVENOUS; SUBCUTANEOUS at 08:02

## 2022-12-12 RX ADMIN — AMINOCAPROIC ACID 5000 MG: 250 INJECTION, SOLUTION INTRAVENOUS at 07:16

## 2022-12-12 RX ADMIN — VECURONIUM BROMIDE 10 MG: 10 INJECTION, POWDER, LYOPHILIZED, FOR SOLUTION INTRAVENOUS at 08:25

## 2022-12-12 RX ADMIN — SODIUM CHLORIDE 2.92 UNITS/HR: 9 INJECTION, SOLUTION INTRAVENOUS at 11:19

## 2022-12-12 RX ADMIN — PROPOFOL 10 MCG/KG/MIN: 10 INJECTION, EMULSION INTRAVENOUS at 10:59

## 2022-12-12 RX ADMIN — PHENYLEPHRINE HYDROCHLORIDE 100 MCG: 10 INJECTION INTRAVENOUS at 10:01

## 2022-12-12 RX ADMIN — FENTANYL CITRATE 100 MCG: 50 INJECTION, SOLUTION INTRAMUSCULAR; INTRAVENOUS at 08:12

## 2022-12-12 RX ADMIN — FENTANYL CITRATE 50 MCG: 50 INJECTION, SOLUTION INTRAMUSCULAR; INTRAVENOUS at 22:33

## 2022-12-12 RX ADMIN — PHENYLEPHRINE HYDROCHLORIDE 100 MCG: 10 INJECTION INTRAVENOUS at 07:22

## 2022-12-12 RX ADMIN — CEFAZOLIN 2 G: 1 INJECTION, POWDER, FOR SOLUTION INTRAMUSCULAR; INTRAVENOUS at 07:27

## 2022-12-12 RX ADMIN — CEFAZOLIN 2000 MG: 2 INJECTION, POWDER, FOR SOLUTION INTRAMUSCULAR; INTRAVENOUS at 18:12

## 2022-12-12 RX ADMIN — VECURONIUM BROMIDE 10 MG: 10 INJECTION, POWDER, LYOPHILIZED, FOR SOLUTION INTRAVENOUS at 07:09

## 2022-12-12 RX ADMIN — SODIUM CHLORIDE: 9 INJECTION, SOLUTION INTRAVENOUS at 10:58

## 2022-12-12 RX ADMIN — CALCIUM CHLORIDE 0.5 G: 100 INJECTION, SOLUTION INTRAVENOUS at 09:55

## 2022-12-12 RX ADMIN — FENTANYL CITRATE 250 MCG: 50 INJECTION, SOLUTION INTRAMUSCULAR; INTRAVENOUS at 07:09

## 2022-12-12 RX ADMIN — Medication 2 MCG/MIN: at 11:14

## 2022-12-12 RX ADMIN — 0.12% CHLORHEXIDINE GLUCONATE 15 ML: 1.2 RINSE ORAL at 11:40

## 2022-12-12 RX ADMIN — ASPIRIN 81 MG CHEWABLE TABLET 81 MG: 81 TABLET CHEWABLE at 11:40

## 2022-12-12 RX ADMIN — PROTAMINE SULFATE 250 MG: 10 INJECTION, SOLUTION INTRAVENOUS at 09:36

## 2022-12-12 RX ADMIN — FENTANYL CITRATE 25 MCG: 50 INJECTION, SOLUTION INTRAMUSCULAR; INTRAVENOUS at 16:40

## 2022-12-12 RX ADMIN — SODIUM BICARBONATE 50 MEQ: 84 INJECTION INTRAVENOUS at 15:22

## 2022-12-12 RX ADMIN — POTASSIUM CHLORIDE 20 MEQ: 29.8 INJECTION, SOLUTION INTRAVENOUS at 12:20

## 2022-12-12 RX ADMIN — SODIUM CHLORIDE, PRESERVATIVE FREE 10 ML: 5 INJECTION INTRAVENOUS at 20:26

## 2022-12-12 RX ADMIN — PHENYLEPHRINE HYDROCHLORIDE 100 MCG: 10 INJECTION INTRAVENOUS at 08:20

## 2022-12-12 RX ADMIN — SODIUM CHLORIDE, POTASSIUM CHLORIDE, SODIUM LACTATE AND CALCIUM CHLORIDE: 600; 310; 30; 20 INJECTION, SOLUTION INTRAVENOUS at 06:49

## 2022-12-12 RX ADMIN — METOPROLOL SUCCINATE 12.5 MG: 25 TABLET, EXTENDED RELEASE ORAL at 04:37

## 2022-12-12 RX ADMIN — ALBUMIN (HUMAN) 500 ML: 12.5 INJECTION, SOLUTION INTRAVENOUS at 09:50

## 2022-12-12 RX ADMIN — CEFAZOLIN 2 G: 1 INJECTION, POWDER, FOR SOLUTION INTRAMUSCULAR; INTRAVENOUS at 09:48

## 2022-12-12 RX ADMIN — SODIUM CHLORIDE: 9 INJECTION, SOLUTION INTRAVENOUS at 06:49

## 2022-12-12 RX ADMIN — AMINOCAPROIC ACID 1 G/HR: 250 INJECTION, SOLUTION INTRAVENOUS at 07:16

## 2022-12-12 RX ADMIN — FENTANYL CITRATE 25 MCG: 50 INJECTION, SOLUTION INTRAMUSCULAR; INTRAVENOUS at 14:21

## 2022-12-12 RX ADMIN — POTASSIUM CHLORIDE 10 MEQ: 7.46 INJECTION, SOLUTION INTRAVENOUS at 10:24

## 2022-12-12 RX ADMIN — FENTANYL CITRATE 50 MCG: 50 INJECTION, SOLUTION INTRAMUSCULAR; INTRAVENOUS at 20:01

## 2022-12-12 RX ADMIN — FENTANYL CITRATE 150 MCG: 50 INJECTION, SOLUTION INTRAMUSCULAR; INTRAVENOUS at 07:48

## 2022-12-12 RX ADMIN — PHENYLEPHRINE HYDROCHLORIDE 100 MCG: 10 INJECTION INTRAVENOUS at 07:16

## 2022-12-12 RX ADMIN — Medication 3 MG: at 10:53

## 2022-12-12 RX ADMIN — MIDAZOLAM 2 MG: 1 INJECTION INTRAMUSCULAR; INTRAVENOUS at 07:00

## 2022-12-12 ASSESSMENT — PAIN - FUNCTIONAL ASSESSMENT
PAIN_FUNCTIONAL_ASSESSMENT: PREVENTS OR INTERFERES SOME ACTIVE ACTIVITIES AND ADLS

## 2022-12-12 ASSESSMENT — PAIN DESCRIPTION - LOCATION
LOCATION: CHEST;STERNUM
LOCATION: CHEST
LOCATION: CHEST

## 2022-12-12 ASSESSMENT — PAIN DESCRIPTION - DESCRIPTORS
DESCRIPTORS: ACHING;DISCOMFORT;SORE
DESCRIPTORS: DISCOMFORT;SPASM
DESCRIPTORS: ACHING;DISCOMFORT
DESCRIPTORS: SORE;ACHING;DISCOMFORT

## 2022-12-12 ASSESSMENT — PAIN SCALES - GENERAL
PAINLEVEL_OUTOF10: 4
PAINLEVEL_OUTOF10: 8
PAINLEVEL_OUTOF10: 5
PAINLEVEL_OUTOF10: 4
PAINLEVEL_OUTOF10: 7
PAINLEVEL_OUTOF10: 0
PAINLEVEL_OUTOF10: 0
PAINLEVEL_OUTOF10: 8
PAINLEVEL_OUTOF10: 6
PAINLEVEL_OUTOF10: 8
PAINLEVEL_OUTOF10: 8

## 2022-12-12 ASSESSMENT — PAIN DESCRIPTION - FREQUENCY
FREQUENCY: INTERMITTENT
FREQUENCY: CONTINUOUS

## 2022-12-12 ASSESSMENT — PAIN DESCRIPTION - ORIENTATION
ORIENTATION: MID

## 2022-12-12 ASSESSMENT — PAIN DESCRIPTION - PAIN TYPE
TYPE: SURGICAL PAIN
TYPE: SURGICAL PAIN

## 2022-12-12 ASSESSMENT — PAIN DESCRIPTION - ONSET
ONSET: ON-GOING
ONSET: GRADUAL

## 2022-12-12 ASSESSMENT — PULMONARY FUNCTION TESTS
PIF_VALUE: 14
PIF_VALUE: 16
PIF_VALUE: 18
PIF_VALUE: 14

## 2022-12-12 NOTE — PROGRESS NOTES
CVICU Admission Note    Name: Singh Vásquez  MRN: [id number]    CC: Postoperative Critical Care Management     Indication for Surgery/Procedure: NSTEMI, CAD  AILIN LVEF:  60%    RVF:  NML    Important/Relevant PMH/PSH: Inferior infarction s/p RCA MIKEL in 2017, Uncontrolled DMII, HTN, chronic RBBB, hyperlipidemia, hypertriglyceridemia    Procedure/Surgeries: 12/12/2022  CABG x3 (LIMA-LAD, SVG-OM, SVG-PDA)  LAAL (35 mm AtriCure)  EVH  Sternal plating     Pacing wires:  Ventricular       Physical Exam:    /73   Pulse 57   Temp 97.5 °F (36.4 °C)   Resp 17   Ht 5' 6\" (1.676 m)   Wt 151 lb 6 oz (68.7 kg)   SpO2 100%   BMI 24.43 kg/m²     Recent Labs     12/11/22  0832 12/12/22  0806 12/12/22  1019   WBC 5.6  --   --    RBC 4.89  --   --    HGB 12.9  --   --    HCT 39.4   < > 27.0*   MCV 80.6  --   --    MCH 26.4  --   --    MCHC 32.7  --   --    RDW 13.7  --   --      --   --    MPV 10.7  --   --     < > = values in this interval not displayed. Recent Labs     12/12/22  0546 12/12/22  0806 12/12/22  1019     --   --    K 4.2   < > 3.3*     --   --    CO2 20*  --   --    BUN 11  --   --    CREATININE 0.5   < > 0.6   GLUCOSE 252*  --   --    CALCIUM 9.9  --   --     < > = values in this interval not displayed. Post operative CXR:        Atelectasis, No pneumothorax noted, Mildly increased vascular markings bilateral, No significant pleural effusion. ETT/lines/drains appear to be in proper position. Final Radiology report pending. General Appearance: Arrived to ICU intubated placed to ventilator, RHI gtt   Eyes: PERRL  Pulmonary: Diminished bibasilar.   No wheezes, no accessory muscle use noted   Ventilator: Mode: AC/VC, 50 FiO2, 5 PEEP, 450 Vt   Cardiovascular: RRR, no heaves or thrills palpated   Telemetry: SR  Abdomen: Soft, OG to LIWS  Extremities:  Palpable pulses all extremities, no edema   Neurologic/Psych: Sedated  Skin: Warm and dry    Incision: MSI with louie dressing intact, RLE SVG sites with ace wrap on       Assessment/Plan: Day of Surgery     1. NSTEMI/CAD S/p CABG x3 (LIMA-LAD, SVG-OM, SVG-PDA), LAAL (35 mm AtriCure)  - ASA, Plavix, Lipitor, Zetia  - Perioperative Ancef  - Monitor chest tube output and hemodynamics closely  - Sternal precautions     2. Acute Pulmonary Insufficiency Following Surgery    - Expected 2/2 surgery  - Intubated on ventilator  - Wean vent settings and extubate patient once awake, following commands, HADDAD, and no signs of bleeding  - ABGs per protocol and PRN     3. Postoperative hypotension   - IVF prn, start low dose norepi for target maps>65    4. Acute Post Operative Pain   - PRN fentanyl for pain management until able to take PO     5.  Uncontrolled DMII  -Hemoglobin A1c 12.1  -Evaluated by Endocrine service prior to surgery  -RHI infusion per nomogram postop       Electronically signed by ILA Combs - CNP on 12/12/2022 at 10:44 AM

## 2022-12-12 NOTE — PROGRESS NOTES
Hospitalist Progress Note      PCP: Era Fernando MD    Date of Admission: 12/7/2022    Chief Complaint: Chest pain, transfer for left heart catheterization, multivessel CAD    Hospital Course: The patient is a 57 y.o. female patient of Montez Cervantes DO history of CAD status stents in 2017, type 2 diabetes, hypertension, hyperlipidemia who presents with chest pain initially admitted to [facility]. Patient developed chest pain yesterday morning while walking to the bathroom. Pressure type pain associated with shortness of breath. Worsened with exertion improved with rest.  Patient went to [facility] and was diagnosed with concern for NSTEMI. She was transferred to [facility] for left heart catheterization. Cardiology consulted-left heart catheterization revealed three-vessel disease with apical hypokinesis on LV gram.  Patient is admitted for further evaluation and treatment. Cardiothoracic surgery was consulted due to the multivessel CAD.     Subjective: Patient is seen post CABG in the ICU- Intubated, sedated  PEEP-5, FiO2-50     Medications:  Reviewed    Infusion Medications    sodium chloride 30 mL/hr at 12/12/22 1058    sodium chloride      propofol Stopped (12/12/22 1158)    sodium chloride      norepinephrine 2 mcg/min (12/12/22 1114)    nitroprusside (NIPRIDE) 50 mg in D5W infusion      insulin 3.57 Units/hr (12/12/22 1206)    dextrose       Scheduled Medications    sodium chloride flush  5-40 mL IntraVENous 2 times per day    [START ON 12/13/2022] aspirin  81 mg Oral Daily    chlorhexidine  15 mL Mouth/Throat BID    [START ON 12/13/2022] magnesium oxide  400 mg Oral BID    mupirocin   Nasal BID    sennosides-docusate sodium  1 tablet Oral BID    [START ON 12/13/2022] pantoprazole  40 mg Oral Daily    pantoprazole (PROTONIX) 40 mg injection  40 mg IntraVENous Once    ceFAZolin (ANCEF) IVPB  2,000 mg IntraVENous Q8H    ipratropium-albuterol  1 ampule Inhalation Q4H WA    [START ON 12/13/2022] insulin glargine  0.15 Units/kg SubCUTAneous Nightly    insulin lispro  0-16 Units SubCUTAneous Q4H    [START ON 12/13/2022] clopidogrel  75 mg Oral Daily    propofol        ezetimibe  10 mg Oral Nightly    atorvastatin  80 mg Oral Nightly     PRN Meds: sodium chloride flush, sodium chloride, ondansetron **OR** ondansetron, acetaminophen, oxyCODONE **OR** oxyCODONE, fentanNYL **OR** fentanNYL, meperidine, propofol, magnesium hydroxide, [START ON 12/13/2022] bisacodyl, potassium chloride, magnesium sulfate, calcium chloride IVPB **OR** calcium chloride IVPB, albumin human, sodium chloride, norepinephrine, nitroprusside (NIPRIDE) 50 mg in D5W infusion, glucose, dextrose bolus **OR** dextrose bolus, glucagon (rDNA), dextrose, acetaminophen, amiodarone, amiodarone bolus, magnesium sulfate      Intake/Output Summary (Last 24 hours) at 12/12/2022 1234  Last data filed at 12/12/2022 1212  Gross per 24 hour   Intake 3325 ml   Output 2560 ml   Net 765 ml       Exam:    BP (!) 100/50   Pulse 68   Temp 96.8 °F (36 °C) (Temporal)   Resp 22   Ht 5' 6\" (1.676 m)   Wt 151 lb 6 oz (68.7 kg)   SpO2 100%   BMI 24.43 kg/m²     General appearance: intubated, appears stated age and cooperative. Neck: No jugular venous distention. Trachea midline. Respiratory:  intubated  Cardiovascular:  Regular rate and rhythm with normal S1/S2 without murmurs, rubs or gallops. Abdomen: Soft, non-tender, non-distended with normal bowel sounds. Musculoskeletal:  No clubbing, cyanosis or edema bilaterally. Skin: Skin color, texture, turgor normal.  No rashes or lesions. Neurologic:  Intubated, sedated  Psychiatric:  Unable to assess      Labs:   Recent Labs     12/11/22  0832 12/12/22  0806 12/12/22  0848 12/12/22  0921 12/12/22  1019   WBC 5.6  --   --   --   --    HGB 12.9  --   --   --   --    HCT 39.4   < > 27.0* 28.0* 27.0*     --   --   --   --     < > = values in this interval not displayed.      Recent Labs 12/11/22  0832 12/12/22  0546 12/12/22  0806 12/12/22  0921 12/12/22  1019 12/12/22  1100    135  --   --   --  138   K 4.2 4.2   < > 4.2 3.3* 3.7   CL 98 102  --   --   --  107   CO2 20* 20*  --   --   --  20*   BUN 13 11  --   --   --  9   CREATININE 0.5 0.5   < > 0.6 0.6 0.5   CALCIUM 9.7 9.9  --   --   --  9.4    < > = values in this interval not displayed. Recent Labs     12/11/22  0832   AST 21   ALT 33*   BILITOT 0.6   ALKPHOS 79       Recent Labs     12/11/22  0832 12/12/22  1100   INR 1.1 1.3       No results for input(s): Lallmanda Ill in the last 72 hours. Assessment/Plan:    Active Hospital Problems    Diagnosis Date Noted    Complication of surgical procedure [T81. 9XXA] 12/12/2022     Priority: Medium    Acute postoperative pain [G89.18] 12/12/2022     Priority: Medium    Hyperglycemia [R73.9] 12/12/2022     Priority: Medium    Postoperative hypotension [I95.81] 12/12/2022     Priority: Medium    Dietary noncompliance [Z91.119] 12/10/2022     Priority: Medium    CAD in native artery [I25.10] 12/09/2022     Priority: Medium    CAD, multiple vessel [I25.10] 12/07/2022     Priority: Medium    NSTEMI (non-ST elevated myocardial infarction) ([facility] [address] [I21.4] 12/06/2022     Priority: Medium    Poorly controlled type 2 diabetes mellitus ([facility] [address] [E11.65] 06/03/2011    Benign essential hypertension [I10] 06/03/2011    HLD (hyperlipidemia) [E78.5] 06/03/2011   Multivessel CAD  Hypertriglyceridemia, hypercholesterolemia  Hypertension  Diabetes mellitus type 2-uncontrolled    Continue on telemetry  -Continue aspirin, statin echocardiogram ordered-shows an ejection fraction of 45-50%, severely sclerotic aortic valve without significant aortic stenosis or regurgitation  -Cardiology consulted-status post heart cath-multivessel CAD  -CT surgery On board-s/p CABG 12/12/22  -Recent A1c reviewed-12.0.  Glipizide held due to risk for hypoglycemia. Sliding scale insulin, hypoglycemia protocol added. Endocrinology consulted-after long discussion patient was initiated on Lantus 12 units nightly, Humalog 4 units with meals, titration per endocrinology-appreciate recommendations  -Continue home medications for hypertension    DVT Prophylaxis: Per cardiology  Diet: ADULT DIET; Regular; 4 carb choices (60 gm/meal); Low Fat/Low Chol/High Fiber/SOHAIL; No Added Salt (3-4 gm); High Fiber  Code Status: Full Code    Dispo -s/p CABG - further care per CTS , will sign off at this time.     Meg Miller MD

## 2022-12-12 NOTE — ANESTHESIA PRE PROCEDURE
Department of Anesthesiology  Preprocedure Note       Name:  Singh Vásquez   Age:  57 y.o.  :  1960                                          MRN:  [id number]         Date:  2022      Surgeon: Fer Peralta):  Shelia Diaz DO    Procedure: Procedure(s):  CABG CORONARY ARTERY BYPASS    Medications prior to admission:   Prior to Admission medications    Medication Sig Start Date End Date Taking? Authorizing Provider   aspirin 81 MG chewable tablet Take 81 mg by mouth in the morning and 81 mg in the evening. Historical Provider, MD   magnesium oxide (MAG-OX) 400 MG tablet Take 400 mg by mouth nightly    Historical Provider, MD   calcium carbonate 600 MG TABS tablet Take 600 mg by mouth nightly    Historical Provider, MD   Omega-3 Fatty Acids (FISH OIL) 1000 MG capsule Take 1,000 mg by mouth 2 times daily    Historical Provider, MD   metoprolol tartrate (LOPRESSOR) 25 MG tablet Take 25 mg by mouth every morning **SEE OTHER ORDER**    Historical Provider, MD   metoprolol tartrate (LOPRESSOR) 25 MG tablet Take 12.5 mg by mouth nightly **SEE OTHER ORDER**    Historical Provider, MD   lisinopril-hydroCHLOROthiazide (PRINZIDE;ZESTORETIC) 20-12.5 MG per tablet Take 1 tablet by mouth every morning **SEE OTHER ORDER**    Historical Provider, MD   lisinopril-hydroCHLOROthiazide (PRINZIDE;ZESTORETIC) 20-12.5 MG per tablet Take 1 tablet by mouth nightly **SEE OTHER ORDER**  1/2 tablet    Historical Provider, MD   glipiZIDE (GLUCOTROL) 10 MG tablet Take 10 mg by mouth every morning **SEE OTHER ORDER**    Historical Provider, MD   glipiZIDE (GLUCOTROL) 10 MG tablet Take 5 mg by mouth nightly **SEE OTHER ORDER**    Historical Provider, MD   POTASSIUM PO Take 50 mg by mouth every other day    Historical Provider, MD   nitroGLYCERIN (NITROSTAT) 0.4 MG SL tablet Place 1 tablet under the tongue every 5 minutes as needed for Chest pain up to max of 3 total doses.  If no relief after 1 dose, call 911. 22   Jose Juan Nails, DO Cholecalciferol (VITAMIN D3) 5000 UNITS TABS Take 5,000 Units by mouth every morning    Historical Provider, MD       Current medications:    Current Facility-Administered Medications   Medication Dose Route Frequency Provider Last Rate Last Admin    ezetimibe (ZETIA) tablet 10 mg  10 mg Oral Nightly Mitchell Betancur MD   10 mg at 12/11/22 2136    insulin lispro (HUMALOG) injection vial 0-12 Units  0-12 Units SubCUTAneous TID  Julian Monet MD   2 Units at 12/11/22 1623    insulin lispro (HUMALOG) injection vial 7 Units  7 Units SubCUTAneous TID  Julian Monet MD   7 Units at 12/11/22 1621    insulin glargine-yfgn (SEMGLEE-YFGN) injection vial 20 Units  20 Units SubCUTAneous Nightly Julian Monet MD   20 Units at 12/11/22 2035    mupirocin (BACTROBAN) 2 % ointment   Nasal BID ILA Wheeler - CNP   Given at 12/12/22 0436    chlorhexidine (HIBICLENS) 4 % liquid   Topical See Admin Instructions ILA Wheeler - CNP   Given at 12/11/22 2136    ceFAZolin (ANCEF) 2,000 mg in sterile water 20 mL IV syringe  2,000 mg IntraVENous See Admin Instructions ILA Wheeler CNP        insulin lispro (HUMALOG) injection vial 0-4 Units  0-4 Units SubCUTAneous Nightly Bi Sargent MD        atorvastatin (LIPITOR) tablet 80 mg  80 mg Oral Nightly Mitchell Betancur MD   80 mg at 12/11/22 2031    perflutren lipid microspheres (DEFINITY) injection 1.5 mL  1.5 mL IntraVENous ONCE PRN Mitchell Betancur MD        aspirin chewable tablet 81 mg  81 mg Oral Daily Mitchell Betancur MD   81 mg at 12/11/22 0802    vitamin D3 (CHOLECALCIFEROL) tablet 5,000 Units  5,000 Units Oral QAM Bi Sargent MD   5,000 Units at 12/11/22 0802    nitroGLYCERIN (NITROSTAT) SL tablet 0.4 mg  0.4 mg SubLINGual Q5 Min PRN Bi Sargent MD        magnesium oxide (MAG-OX) tablet 400 mg  400 mg Oral Nightly Bi Sargent MD   400 mg at 12/11/22 2144    calcium elemental (OSCAL) tablet 500 mg  500 mg Oral Nightly Rodoflo Weiner MD   500 mg at 22 2131    glucose chewable tablet 16 g  4 tablet Oral PRN Rodolfo Weiner MD        dextrose bolus 10% 125 mL  125 mL IntraVENous PRN Rodolfo Weiner MD        Or    dextrose bolus 10% 250 mL  250 mL IntraVENous PRN Rodolfo Weiner MD        glucagon (rDNA) injection 1 mg  1 mg SubCUTAneous PRN Bi Sargent MD        dextrose 10 % infusion   IntraVENous Continuous PRN Bi Sargent MD        sodium chloride flush 0.9 % injection 5-40 mL  5-40 mL IntraVENous 2 times per day Yudith Crabtree MD   10 mL at 22 0803    sodium chloride flush 0.9 % injection 5-40 mL  5-40 mL IntraVENous PRN Yudith Crabtree MD        0.9 % sodium chloride infusion   IntraVENous PRN Yudith Crabtree MD        acetaminophen (TYLENOL) tablet 650 mg  650 mg Oral Q4H PRN Yudith Crabtree MD           Allergies:     Allergies   Allergen Reactions    Metformin Diarrhea       Problem List:    Patient Active Problem List   Diagnosis Code    Benign essential hypertension I10    HLD (hyperlipidemia) E78.5    Poorly controlled type 2 diabetes mellitus ([facility] [address] E11.65    2019 novel coronavirus disease (COVID-19) U07.1    Coronary artery disease involving native coronary artery of native heart without angina pectoris I25.10    NSTEMI (non-ST elevated myocardial infarction) ([facility] [address] I21.4    History of right coronary artery stent placement Z95.5    RBBB (right bundle branch block) I45.10    CAD, multiple vessel I25.10    CAD in native artery I25.10    Dietary noncompliance Z91.119       Past Medical History:        Diagnosis Date    2019 novel coronavirus disease (COVID-19) 11/10/2021    Coronary artery disease involving native coronary artery of native heart without angina pectoris     Diabetes mellitus ([facility] [address]     Hyperlipidemia     Hypertension        Past Surgical History:        Procedure Laterality Date     SECTION      3  sections    CORONARY ANGIOPLASTY WITH STENT PLACEMENT  04/10/2017    3.0/38 and 3.0/20 Synergy stents to mid and distal RCA by Dr. Nettie Aj History:    Social History     Tobacco Use    Smoking status: Never    Smokeless tobacco: Never   Substance Use Topics    Alcohol use: No                                Counseling given: Not Answered      Vital Signs (Current):   Vitals:    12/11/22 1555 12/11/22 1948 12/11/22 2318 12/12/22 0403   BP: 121/78 126/81 104/62 109/73   Pulse: 79 79 64 57   Resp: 18 18 18 17   Temp: 97.8 °F (36.6 °C) 98.1 °F (36.7 °C) 97.2 °F (36.2 °C) 97.5 °F (36.4 °C)   TempSrc: Temporal Temporal Temporal    SpO2: 99% 99% 99% 100%   Weight:    151 lb 6 oz (68.7 kg)   Height:                                                  BP Readings from Last 3 Encounters:   12/12/22 109/73   12/07/22 126/82   01/21/22 132/62       NPO Status: Time of last liquid consumption: 2355                        Time of last solid consumption: 2355                        Date of last liquid consumption: 12/11/22                        Date of last solid food consumption: 12/11/22    BMI:   Wt Readings from Last 3 Encounters:   12/12/22 151 lb 6 oz (68.7 kg)   12/06/22 155 lb (70.3 kg)   01/21/22 156 lb (70.8 kg)     Body mass index is 24.43 kg/m².     CBC:   Lab Results   Component Value Date/Time    WBC 5.6 12/11/2022 08:32 AM    RBC 4.89 12/11/2022 08:32 AM    HGB 12.9 12/11/2022 08:32 AM    HCT 39.4 12/11/2022 08:32 AM    MCV 80.6 12/11/2022 08:32 AM    RDW 13.7 12/11/2022 08:32 AM     12/11/2022 08:32 AM       CMP:   Lab Results   Component Value Date/Time     12/11/2022 08:32 AM    K 4.2 12/11/2022 08:32 AM    K 4.2 12/11/2022 06:44 AM    CL 98 12/11/2022 08:32 AM    CO2 20 12/11/2022 08:32 AM    BUN 13 12/11/2022 08:32 AM    CREATININE 0.5 12/11/2022 08:32 AM    GFRAA >60 04/11/2017 04:15 AM    LABGLOM >60 12/11/2022 08:32 AM    GLUCOSE 357 12/11/2022 08:32 AM    GLUCOSE 186 11/08/2010 08:28 AM    PROT 7.1 12/11/2022 08:32 AM    CALCIUM 9.7 12/11/2022 08:32 AM    BILITOT 0.6 12/11/2022 08:32 AM    ALKPHOS 79 12/11/2022 08:32 AM    AST 21 12/11/2022 08:32 AM    ALT 33 12/11/2022 08:32 AM       POC Tests: No results for input(s): POCGLU, POCNA, POCK, POCCL, POCBUN, POCHEMO, POCHCT in the last 72 hours. Coags:   Lab Results   Component Value Date/Time    PROTIME 11.8 12/11/2022 08:32 AM    INR 1.1 12/11/2022 08:32 AM    APTT 28.3 12/11/2022 08:32 AM       HCG (If Applicable): No results found for: PREGTESTUR, PREGSERUM, HCG, HCGQUANT     ABGs: No results found for: PHART, PO2ART, VQH7QPH, WHA3ZHN, BEART, Z8YXODJX     Type & Screen (If Applicable):  No results found for: LABABO, LABRH    Drug/Infectious Status (If Applicable):  No results found for: HIV, HEPCAB    COVID-19 Screening (If Applicable):   Lab Results   Component Value Date/Time    COVID19 Detected 11/10/2021 02:53 PM    COVID19 Detected 11/10/2021 02:42 AM       EKG 12/8/2022  Component Ref Range & Units 12/8/22 1117 12/6/22 0813 12/6/22 0709 4/10/17 1703   Ventricular Rate BPM 78  82  76  69    Atrial Rate BPM 78  82  76  69    P-R Interval ms 180  174  168  178    QRS Duration ms 128  122  128  114    Q-T Interval ms 456  392  384  444    QTc Calculation (Bazett) ms 519  457  432  475    P Axis degrees 53  35  60  44    R Axis degrees -74  -70  -68  -23    T Axis degrees -109  -36  -11  -18    Resulting Agency  [address]           Narrative & Impression    Normal sinus rhythm  Right bundle branch block  Left anterior fascicular block  Bifascicular block   Possible Lateral infarct , age undetermined  T wave abnormality, consider inferior ischemia  Abnormal ECG  No previous ECGs available  Confirmed by Etienne Alvarez ([id number]) on 12/8/2022 3:24:06 PM           ECHOCARDIOGRAM 12/9/22   Findings      Left Ventricle   Left ventricle size is normal.   Mild left ventricular concentric hypertrophy noted.    Mildly reduced left ventricular systolic dysfunction. Distal anterior and apical hyokinesis. Ejection fraction is visually estimated at 45-50%. Indeterminate diastolic function. Right Ventricle   Normal right ventricular size and function. Left Atrium   The left atrium is mildly dilated. Interatrial septum appears intact. Right Atrium   Normal right atrium size. Mitral Valve   Calcification of the mitral valve noted. Mitral annular calcification is present. Tricuspid Valve   The tricuspid valve appears structurally normal.   Mild tricuspid regurgitation. RVSP is 35 mmHg. Mild pulmonary hypertension. Aortic Valve   The aortic valve is trileaflet. The aortic valve appears severely sclerotic. No hemodynamically significant aortic stenosis is present. No evidence of aortic valve regurgitation. Pulmonic Valve   The pulmonic valve was not well visualized. No evidence of pulmonic valve stenosis. No evidence of any pulmonic regurgitation. Pericardial Effusion   No evidence of pericardial effusion. Pleural Effusion   No evidence of pleural effusion. Aorta   Aortic root dimension within normal limits. Conclusions    CARDIAC CATH 12/7/22  CORONARY ANATOMY:  1. Left main:  It is a short artery which is medium in size with no  angiographic stenosis noted. 2.  LAD:  It is medium in size and reaching the apex and giving rise to  two small diagonal branches and several small septal perforators. The  LAD was moderately calcified in its proximal to mid segment. There was  a long 70% to 80% proximal to mid stenosis. The LAD is small in size  distally. 3.  Left circumflex: It is a large artery giving rise to a bifurcating  first obtuse marginal branch which has a high takeoff, then to a second  obtuse marginal branch. The upper branch of OM-1 has 70% to 80%  discrete proximal stenosis. The lower branch also has 60% to 70%  discrete proximal stenosis.   4.  RCA:  It is a large dominant artery giving rise to two RV marginal  branches, a PDA and a bifurcating PLV branch. There was 50% tubular  proximal to mid RCA stenosis. There were overlapping stents noted in  the mid to distal RCA with moderate diffuse in-stent restenosis in the  proximal and distal segment and 70% to 80% discrete mid in-stent  restenosis. There was 80% discrete proximal PDA stenosis and 70% to 80%  proximal PLV branch stenosis.     Left ventriculogram revealed mid to apical akinesis with an ejection  fraction of 40% to 45%. No mitral regurgitation was noted.     IMPRESSION:  1. Severe triple-vessel CAD. 2.  Probable Takotsubo cardiomyopathy with an ejection fraction of 40%  to 45%.     RECOMMENDATIONS:  1. Aggressive medical therapy. 2.  Guideline-directed medical therapy. 3.  CT Surgery consult for possible CABG. CT CHEST 12/9/22  Impression   Normal ascending thoracic aorta without calcification or dissection. Ilah Gricel   is diffuse coronary artery calcification.       Multiple 2-7 mm nonspecific pulmonary nodules.  Consider surveillance   according to Fleischner society guidelines     CAROTID /9/22  Impression   Atherosclerotic disease. No hemodynamically significant stenosis is   identified   Estimated stenosis by NASCET criteria in the proximal right carotid   artery is between 0% and 49%. Estimated stenosis by NASCET criteria in the proximal left carotid   artery is between 0% and 49%. Anesthesia Evaluation  Patient summary reviewed and Nursing notes reviewed no history of anesthetic complications:   Airway: Mallampati: III  TM distance: >3 FB   Neck ROM: full  Mouth opening: > = 3 FB   Dental:          Pulmonary: breath sounds clear to auscultation  (+) shortness of breath:      (-) not a current smoker          Patient did not smoke on day of surgery.                  Cardiovascular:  Exercise tolerance: poor (<4 METS),   (+) hypertension:, past MI:, CAD:, CABG/stent (RCA stent):, CABRAL:, hyperlipidemia      ECG reviewed  Rhythm: regular  Rate: normal  Echocardiogram reviewed    Cleared by cardiology     Beta Blocker:  Dose within 24 Hrs         Neuro/Psych:   Negative Neuro/Psych ROS              GI/Hepatic/Renal: Neg GI/Hepatic/Renal ROS            Endo/Other:    (+) DiabetesType II DM, poorly controlled, , .                 Abdominal:   (+) obese,     Abdomen: soft. Vascular: negative vascular ROS. Other Findings:           Anesthesia Plan      general     ASA 4       Induction: intravenous. arterial line, central line and AILIN  MIPS: Postoperative ventilation. Anesthetic plan and risks discussed with patient. Use of blood products discussed with patient whom consented to blood products. Plan discussed with CRNA and fellow. DOS STAFF ADDENDUM:    Patient seen and chart reviewed. Physical exam and history updated as indicated. NPO status confirmed. Anesthesia options and plan discussed including risks benefits with patient/legal guardian and family as available. Concerns and questions addressed. Consent verbalized to proceed.   Anesthesia plan, options and intraoperative/postoperative concerns discussed with care team.    Alicia Gomes MD, MD  12/12/2022  6:30 AM        Alicia Gomes MD   12/12/2022

## 2022-12-12 NOTE — PROGRESS NOTES
Patient signed out of anesthesia, discharge criteria met. Patient can HADDAD equal bilaterally, alert and oriented, just extubated per RT, tolerated well. Will continue to monitor.

## 2022-12-12 NOTE — ANESTHESIA PROCEDURE NOTES
Procedure Performed: AILIN       Start Time:        End Time:      Preanesthesia Checklist:  Patient identified, IV assessed, risks and benefits discussed, monitors and equipment assessed, procedure being performed at surgeon's request and anesthesia consent obtained. General Procedure Information  Diagnostic Indications for Echo:  assessment of ascending aorta, assessment of surgical repair, defect repair evaluation, hemodynamic monitoring and assessment of valve function  Physician Requesting Echo: Ml Arguello DO  Location performed:  OR  Intubated  Bite block placed  Heart visualized  Probe Insertion:  Easy  Probe Type:  3D and mulitplane  Modalities:  3D, color flow mapping, pulse wave Doppler and continuous wave Doppler    Echocardiographic and Doppler Measurements    Ventricles    Right Ventricle:  Cavity size normal.  Hypertrophy not present. Thrombus not present. Global function normal.    Left Ventricle:  Cavity size normal.  Hypertrophy not present. Thrombus not present. Global Function mildly impaired. Ejection Fraction 52%. Ventricular Regional Function:  1- Basal Anteroseptal:  normal  2- Basal Anterior:  normal  3- Basal Anterolateral:  normal  4- Basal Inferolateral:  normal  5- Basal Inferior:  normal  6- Basal Inferoseptal:  normal  7- Mid Anteroseptal:  normal  8- Mid Anterior:  hypokinetic  9- Mid Anterolateral:  hypokinetic  10- Mid Inferolateral:  normal  11- Mid Inferior:  normal  12- Mid Inferoseptal:  normal  13- Apical Anterior:  akinetic  14- Apical Lateral:  akinetic  15- Apical Inferior:  akinetic  16- Apical Septal:  akinetic  17- Hooper:  akinetic      Valves    Aortic Valve: Annulus calcified. Stenosis not present. Regurgitation none. Leaflets calcified. Leaflet motions normal.      Mitral Valve: Annulus calcified. Stenosis not present. Regurgitation mild. Leaflets normal.  Leaflet motions normal.      Tricuspid Valve: Annulus normal.  Stenosis not present. Regurgitation mild. Leaflets normal.  Leaflet motions normal.    Pulmonic Valve: Annulus normal.  Stenosis not present. Regurgitation mild. Aorta    Ascending Aorta:  Size normal.  Dissection not present. Aortic Arch:  Size normal.  Dissection not present. Descending Aorta:  Size normal.  Dissection not present. Atria    Right Atrium:  Size normal.  Spontaneous echo contrast not present. Thrombus not present. Tumor not present. Device not present. Left Atrium:  Size normal.  Spontaneous echo contrast not present. Thrombus not present. Tumor not present. Device not present. Left atrial appendage normal.      Septa    Atrial Septum:  Intra-atrial septal morphology contains patent foramen ovale. Patent foramen ovale shunts left to right. Ventricular Septum:  Intra-ventricular septum morphology normal.          Other Findings  Pericardium:  normal  Pleural Effusion:  none  Pulmonary Arteries:  normal  Pulmonary Venous Flow:  normal    Anesthesia Information  Performed Personally  Anesthesiologist:  Jhonny Dominguez MD      Echocardiogram Comments:       No difficulty with probe insertion or manipulation    Post Intervention Follow-up Study  Aortic Function: unchangedMitral Function: unchangedTricuspid Function: unchangedNoneComments: Improved global LV function. [place] remains akinetic but distal anterior wall motion improved. EF 63%  No vasopressors or inotrope infusions. Aortic repair intact. No changed in valve function.

## 2022-12-12 NOTE — ANESTHESIA PROCEDURE NOTES
Central Venous Line:    A central venous line was placed using ultrasound guidance and surface landmarks, in the OR for the following indication(s): central venous access and CVP monitoring. Sterility preparation included the following: hand hygiene performed prior to procedure, maximum sterile barriers used and sterile technique used to drape from head to toe. The patient was placed in Trendelenburg position. The right internal jugular vein was prepped. The site was prepped with Chloraprep. A 8.5 Fr (size), 10 (length), introducer slick was placed. During the procedure, the following specific steps were taken: target vein identified, needle advanced into vein and blood aspirated and guidewire advanced into vein. Intravenous verification was obtained by ultrasound and venous blood return. Post insertion care included: all ports aspirated, all ports flushed easily, guidewire removed intact, Biopatch applied, line sutured in place and dressing applied. During the procedure the patient experienced: patient tolerated procedure well with no complications.       Outcomes: uncomplicated and patient tolerated procedure well  Real-time US image taken/store: yes  Anesthesia type: local..No  Staffing  Performed: Anesthesiologist   Anesthesiologist: Idania Calderon MD  Preanesthetic Checklist  Completed: patient identified, IV checked, site marked, risks and benefits discussed, surgical/procedural consents, equipment checked, pre-op evaluation, timeout performed, anesthesia consent given, oxygen available, monitors applied/VS acknowledged, fire risk safety assessment completed and verbalized and blood product R/B/A discussed and consented

## 2022-12-12 NOTE — BRIEF OP NOTE
Brief Postoperative Note      Patient: Benjy Sam  YOB: 1960  MRN: [id number]    Date of Procedure: 12/12/2022    Pre-Op Diagnosis: CAD in native artery [I25.10]    Post-Op Diagnosis: Same       Procedure(s):  CABG x3 (LIMA-LAD, SVG-OM, SVG-PDA)  LAAL (35 mm AtriCure)  EVH  Sternal plating     Surgeon(s):  Perry Azevedo DO    Assistant:  Physician Assistant: JOURDAN Montgomery; Tiffany Tolentino, APRN - CNP    Anesthesia: General    Estimated Blood Loss (mL): less than 50     Complications: None    Specimens:   * No specimens in log *    Implants:  Implant Name Type Inv.  Item Serial No.  Lot No. LRB No. Used Action   DEVICE OCCL CLP L35MM PLUNG GRP FLX SHFT FOR GILLINOV - [id number]  DEVICE OCCL CLP L35MM PLUNG GRP FLX SHFT FOR GILLINOV  [employer]WD [id number] N/A 1 Implanted   PLATE X FIXATION STERNAL 8 HOLES - [id number]  PLATE X FIXATION STERNAL 8 HOLES  HERBIE BETY-WD  N/A 1 Implanted   PLATE FIXATION BOX STERNAL 4 HOLES - [id number]  PLATE FIXATION BOX STERNAL 4 HOLES  HERBIE BETY-WD  N/A 1 Implanted   SCREW LOCKING SD 2.3X10MM 5P - [id number]  SCREW LOCKING SD 2.3X10MM 5P  HERBIE BETY-WD  N/A 12 Implanted         Drains:   Chest Tube Anterior Mediastinal 1 (Active)   Status Continuous Suction 12/12/22 0840   Dressing Status New dressing applied;Clean, dry & intact 12/12/22 0840   Site Assessment Clean, dry & intact 12/12/22 0840   Surrounding Skin Clean, dry & intact 12/12/22 0840       Chest Tube Mediastinal 2 (Active)   Status Continuous Suction 12/12/22 0840   Dressing Status New dressing applied;Clean, dry & intact 12/12/22 0840   Site Assessment Clean, dry & intact 12/12/22 0840   Surrounding Skin Clean, dry & intact 12/12/22 0840       Chest Tube Pleural 3 (Active)   Status Continuous Suction 12/12/22 0840   Dressing Status New dressing applied;Clean, dry & intact 12/12/22 0840   Site Assessment Clean, dry & intact 12/12/22 0840   Surrounding Skin Clean, dry & intact 12/12/22 4751       Urinary Catheter 12/12/22 Kim-Temperature (Active)         Electronically signed by Dante Tompkins DO on 12/12/2022 at 10:30 AM

## 2022-12-12 NOTE — PROGRESS NOTES
DAILY PROGRESS NOTE -[facility] CARDIOLOGY    SUBJECTIVE:    Seen in room prior to CABG. Feeling well with no further chest discomfort. CABG scheduled for this morning. Ejection fraction 45-50% with mild mid-distal anterior and apical hypokinesis. Surgical CAD with unstable angina. Hypertension, severe hypertriglyceridemia, diabetes, CAD post inferior infarct and RCA drug-eluting stent 2017. Chronic RBBB. OBJECTIVE:    Her vital signs were reviewed today. Vitals:    12/12/22 0403   BP: 109/73   Pulse: 57   Resp: 17   Temp: 97.5 °F (36.4 °C)   SpO2: 100%       Scheduled Meds:   ezetimibe  10 mg Oral Nightly    insulin lispro  0-12 Units SubCUTAneous TID WC    insulin lispro  7 Units SubCUTAneous TID WC    insulin glargine  20 Units SubCUTAneous Nightly    mupirocin   Nasal BID    chlorhexidine   Topical See Admin Instructions    ceFAZolin (ANCEF) IVPB  2,000 mg IntraVENous See Admin Instructions    insulin lispro  0-4 Units SubCUTAneous Nightly    atorvastatin  80 mg Oral Nightly    aspirin  81 mg Oral Daily    vitamin D3  5,000 Units Oral QAM    magnesium oxide  400 mg Oral Nightly    calcium elemental  500 mg Oral Nightly    sodium chloride flush  5-40 mL IntraVENous 2 times per day     Continuous Infusions:   dextrose      sodium chloride       PRN Meds:.perflutren lipid microspheres, nitroGLYCERIN, glucose, dextrose bolus **OR** dextrose bolus, glucagon (rDNA), dextrose, sodium chloride flush, sodium chloride, acetaminophen    REVIEW OF SYSTEMS:     No pruritus, rash, bruising. Cardiac and pulmonary symptoms per HPI. No nausea, vomiting, abdominal pain, GI bleeding, change in bowel habits. No dysuria, urinary frequency, urgency, hematuria, flank pain. Joint pain but no muscle soreness, stiffness, aching. No headache, speech disturbance, lateralizing neurologic deficit. No hemoptysis, epistaxis, easy bruising. Persistent anxiety, but no depression.   No symptoms of hypothyroidism, hyperthyroidism, diabetes, heat or cold intolerance. FAMILY HISTORY: Negative for CAD in first-degree relatives. SOCIAL HISTORY: Negative for alcohol, tobacco, or illicit drug use. PHYSICAL EXAM:    General Appearance:  awake, alert, oriented, in no acute distress  Neck:  no bruits  Lungs:  Normal expansion. Clear to auscultation. No rales, rhonchi, or wheezing. Heart:  Heart sounds are normal.  Regular rate and rhythm without murmur, gallop or rub. Abdomen:  Soft, non-tender. Extremities: Extremities warm to touch, pink, with no edema. Neuro/musculosketal:  Unremarkable. LABS:    Recent Labs     12/10/22  1152 12/11/22  0644 12/11/22  0832    135 132   CREATININE 0.6 0.5 0.5       Recent Labs     12/11/22  0832   HGB 12.9       Recent Labs     12/11/22  0832   INR 1.1           IMPRESSION:    1.  CAD post non-STEMI with surgical disease-feeling well currently and awaiting CABG this morning. Continue aspirin. 2.  Ischemic cardiomyopathy-echocardiogram as above. Continue metoprolol succinate. Has a cough from lisinopril and plan to use valsartan post surgery for cardioprotection. 3.  Severe hyperlipidemia-triglycerides 1200 and LDL unable to be calculated. High-dose atorvastatin along with Zetia to achieve LDL below 70.    4.  Diabetes-per hospitalist team.    5.  Continue to follow.

## 2022-12-12 NOTE — ANESTHESIA POSTPROCEDURE EVALUATION
Department of Anesthesiology  Postprocedure Note    Patient: Margot Ga  MRN: [id number]  YOB: 1960  Date of evaluation: 12/12/2022      Procedure Summary     Date: 12/12/22 Room / Location: [facility] OR 01 / [facility]    Anesthesia Start: 8817 Anesthesia Stop: 9843    Procedure: CABG CORONARY ARTERY BYPASS Diagnosis:       CAD in native artery      (CAD in native artery [I25.10])    Surgeons: Iban Job, DO Responsible Provider: Paola Rodriguez MD    Anesthesia Type: general ASA Status: 4          Anesthesia Type: No value filed.     Elgin Phase I: Elgin Score: 8    Elgin Phase II: Elgin Score: 5      Anesthesia Post Evaluation    Patient location during evaluation: ICU  Patient participation: complete - patient participated  Level of consciousness: awake and alert  Airway patency: patent  Nausea & Vomiting: no nausea and no vomiting  Complications: no  Cardiovascular status: blood pressure returned to baseline and hemodynamically stable  Respiratory status: acceptable  Hydration status: euvolemic

## 2022-12-12 NOTE — PROGRESS NOTES
Patient admitted to [facility] s/p CABGx3, sedated, intubated on the ventilator, connected to monitor, chest tubes connected to -20cm suction and ventricular wires are connected to pacerbox-off at this time. Will continue to monitor and wean towards extubation.

## 2022-12-12 NOTE — ANESTHESIA PROCEDURE NOTES
Arterial Line:    An arterial line was placed using surface landmarks, in the OR for the following indication(s): continuous blood pressure monitoring and blood sampling needed. A 20 gauge (size), 10 cm (length), Arrow (type) catheter was placed, Seldinger technique used, into the right brachial artery, secured by tape and Tegaderm. Anesthesia type: Local  Local infiltration: Injection    Events:  patient tolerated procedure well with no complications.   Anesthesiologist: Elizabeth Yo MD  Resident/CRNA: ILA Zhang CRNA  Performed: Resident/CRNA   Preanesthetic Checklist  Completed: patient identified, IV checked, site marked, risks and benefits discussed, surgical/procedural consents, equipment checked, pre-op evaluation, timeout performed, anesthesia consent given, oxygen available, monitors applied/VS acknowledged, fire risk safety assessment completed and verbalized and blood product R/B/A discussed and consented

## 2022-12-12 NOTE — PLAN OF CARE
Problem: Chronic Conditions and Co-morbidities  Goal: Patient's chronic conditions and co-morbidity symptoms are monitored and maintained or improved  Outcome: Progressing  Flowsheets (Taken 12/9/2022 2045 by Mary Orozco RN)  Care Plan - Patient's Chronic Conditions and Co-Morbidity Symptoms are Monitored and Maintained or Improved:   Monitor and assess patient's chronic conditions and comorbid symptoms for stability, deterioration, or improvement   Collaborate with multidisciplinary team to address chronic and comorbid conditions and prevent exacerbation or deterioration   Update acute care plan with appropriate goals if chronic or comorbid symptoms are exacerbated and prevent overall improvement and discharge     Problem: Safety - Adult  Goal: Free from fall injury  Outcome: Progressing  Flowsheets (Taken 12/12/2022 1500)  Free From Fall Injury: Instruct family/caregiver on patient safety     Problem: ABCDS Injury Assessment  Goal: Absence of physical injury  Outcome: Progressing  Flowsheets (Taken 12/7/2022 1715 by Jose Priest RN)  Absence of Physical Injury: Implement safety measures based on patient assessment     Problem: Pain  Goal: Verbalizes/displays adequate comfort level or baseline comfort level  Outcome: Progressing  Flowsheets (Taken 12/7/2022 1712 by Jose Priest RN)  Verbalizes/displays adequate comfort level or baseline comfort level: Assess pain using appropriate pain scale

## 2022-12-13 ENCOUNTER — APPOINTMENT (OUTPATIENT)
Dept: GENERAL RADIOLOGY | Age: 62
DRG: 233 | End: 2022-12-13
Attending: INTERNAL MEDICINE

## 2022-12-13 LAB
ANION GAP SERPL CALCULATED.3IONS-SCNC: 14 MMOL/L (ref 7–16)
BUN BLDV-MCNC: 9 MG/DL (ref 6–23)
CALCIUM IONIZED: 1.26 MMOL/L (ref 1.15–1.33)
CALCIUM SERPL-MCNC: 8.8 MG/DL (ref 8.6–10.2)
CHLORIDE BLD-SCNC: 106 MMOL/L (ref 98–107)
CO2: 20 MMOL/L (ref 22–29)
CREAT SERPL-MCNC: 0.5 MG/DL (ref 0.5–1)
GFR SERPL CREATININE-BSD FRML MDRD: >60 ML/MIN/1.73
GLUCOSE BLD-MCNC: 156 MG/DL (ref 74–99)
HCT VFR BLD CALC: 28.4 % (ref 34–48)
HEMOGLOBIN: 9.1 G/DL (ref 11.5–15.5)
INR BLD: 1.3
MAGNESIUM: 2 MG/DL (ref 1.6–2.6)
MAGNESIUM: 2.3 MG/DL (ref 1.6–2.6)
MCH RBC QN AUTO: 26.2 PG (ref 26–35)
MCHC RBC AUTO-ENTMCNC: 32 % (ref 32–34.5)
MCV RBC AUTO: 81.8 FL (ref 80–99.9)
METER GLUCOSE: 107 MG/DL (ref 74–99)
METER GLUCOSE: 139 MG/DL (ref 74–99)
METER GLUCOSE: 165 MG/DL (ref 74–99)
METER GLUCOSE: 181 MG/DL (ref 74–99)
METER GLUCOSE: 185 MG/DL (ref 74–99)
METER GLUCOSE: 242 MG/DL (ref 74–99)
METER GLUCOSE: 90 MG/DL (ref 74–99)
PDW BLD-RTO: 14.3 FL (ref 11.5–15)
PLATELET # BLD: 213 E9/L (ref 130–450)
PMV BLD AUTO: 10.7 FL (ref 7–12)
POTASSIUM SERPL-SCNC: 3.8 MMOL/L (ref 3.5–5)
POTASSIUM SERPL-SCNC: 4.1 MMOL/L (ref 3.5–5)
PROTHROMBIN TIME: 14 SEC (ref 9.3–12.4)
RBC # BLD: 3.47 E12/L (ref 3.5–5.5)
SODIUM BLD-SCNC: 140 MMOL/L (ref 132–146)
WBC # BLD: 11.4 E9/L (ref 4.5–11.5)

## 2022-12-13 PROCEDURE — 6370000000 HC RX 637 (ALT 250 FOR IP): Performed by: PHYSICIAN ASSISTANT

## 2022-12-13 PROCEDURE — 6360000002 HC RX W HCPCS: Performed by: PHYSICIAN ASSISTANT

## 2022-12-13 PROCEDURE — 37799 UNLISTED PX VASCULAR SURGERY: CPT

## 2022-12-13 PROCEDURE — 83735 ASSAY OF MAGNESIUM: CPT

## 2022-12-13 PROCEDURE — 36415 COLL VENOUS BLD VENIPUNCTURE: CPT

## 2022-12-13 PROCEDURE — 97535 SELF CARE MNGMENT TRAINING: CPT

## 2022-12-13 PROCEDURE — S5553 INSULIN LONG ACTING 5 U: HCPCS | Performed by: INTERNAL MEDICINE

## 2022-12-13 PROCEDURE — 82330 ASSAY OF CALCIUM: CPT

## 2022-12-13 PROCEDURE — 97162 PT EVAL MOD COMPLEX 30 MIN: CPT

## 2022-12-13 PROCEDURE — 2580000003 HC RX 258: Performed by: PHYSICIAN ASSISTANT

## 2022-12-13 PROCEDURE — 71045 X-RAY EXAM CHEST 1 VIEW: CPT

## 2022-12-13 PROCEDURE — 2140000000 HC CCU INTERMEDIATE R&B

## 2022-12-13 PROCEDURE — 94640 AIRWAY INHALATION TREATMENT: CPT

## 2022-12-13 PROCEDURE — 6360000002 HC RX W HCPCS: Performed by: NURSE PRACTITIONER

## 2022-12-13 PROCEDURE — 2700000000 HC OXYGEN THERAPY PER DAY

## 2022-12-13 PROCEDURE — 97530 THERAPEUTIC ACTIVITIES: CPT

## 2022-12-13 PROCEDURE — 85610 PROTHROMBIN TIME: CPT

## 2022-12-13 PROCEDURE — 85027 COMPLETE CBC AUTOMATED: CPT

## 2022-12-13 PROCEDURE — 82962 GLUCOSE BLOOD TEST: CPT

## 2022-12-13 PROCEDURE — 80048 BASIC METABOLIC PNL TOTAL CA: CPT

## 2022-12-13 PROCEDURE — 84132 ASSAY OF SERUM POTASSIUM: CPT

## 2022-12-13 PROCEDURE — P9046 ALBUMIN (HUMAN), 25%, 20 ML: HCPCS | Performed by: NURSE PRACTITIONER

## 2022-12-13 PROCEDURE — 6370000000 HC RX 637 (ALT 250 FOR IP): Performed by: NURSE PRACTITIONER

## 2022-12-13 PROCEDURE — 97166 OT EVAL MOD COMPLEX 45 MIN: CPT

## 2022-12-13 PROCEDURE — 6370000000 HC RX 637 (ALT 250 FOR IP): Performed by: INTERNAL MEDICINE

## 2022-12-13 RX ORDER — INSULIN LISPRO 100 [IU]/ML
0-4 INJECTION, SOLUTION INTRAVENOUS; SUBCUTANEOUS NIGHTLY
Status: DISCONTINUED | OUTPATIENT
Start: 2022-12-13 | End: 2022-12-15 | Stop reason: HOSPADM

## 2022-12-13 RX ORDER — INSULIN LISPRO 100 [IU]/ML
0-8 INJECTION, SOLUTION INTRAVENOUS; SUBCUTANEOUS NIGHTLY
Status: CANCELLED | OUTPATIENT
Start: 2022-12-13

## 2022-12-13 RX ORDER — INSULIN GLARGINE-YFGN 100 [IU]/ML
10 INJECTION, SOLUTION SUBCUTANEOUS NIGHTLY
Status: DISCONTINUED | OUTPATIENT
Start: 2022-12-13 | End: 2022-12-14

## 2022-12-13 RX ORDER — LIDOCAINE 4 G/G
1 PATCH TOPICAL DAILY
Status: DISCONTINUED | OUTPATIENT
Start: 2022-12-13 | End: 2022-12-15 | Stop reason: HOSPADM

## 2022-12-13 RX ORDER — INSULIN LISPRO 100 [IU]/ML
0-16 INJECTION, SOLUTION INTRAVENOUS; SUBCUTANEOUS
Status: CANCELLED | OUTPATIENT
Start: 2022-12-13

## 2022-12-13 RX ORDER — ALBUMIN (HUMAN) 12.5 G/50ML
25 SOLUTION INTRAVENOUS ONCE
Status: DISCONTINUED | OUTPATIENT
Start: 2022-12-13 | End: 2022-12-13 | Stop reason: ALTCHOICE

## 2022-12-13 RX ORDER — ACETAMINOPHEN 500 MG
1000 TABLET ORAL EVERY 8 HOURS PRN
Status: DISPENSED | OUTPATIENT
Start: 2022-12-13 | End: 2022-12-15

## 2022-12-13 RX ORDER — INSULIN LISPRO 100 [IU]/ML
0-8 INJECTION, SOLUTION INTRAVENOUS; SUBCUTANEOUS
Status: DISCONTINUED | OUTPATIENT
Start: 2022-12-13 | End: 2022-12-15 | Stop reason: HOSPADM

## 2022-12-13 RX ADMIN — DOCUSATE SODIUM 50 MG AND SENNOSIDES 8.6 MG 1 TABLET: 8.6; 5 TABLET, FILM COATED ORAL at 08:41

## 2022-12-13 RX ADMIN — INSULIN GLARGINE-YFGN 10 UNITS: 100 INJECTION, SOLUTION SUBCUTANEOUS at 21:14

## 2022-12-13 RX ADMIN — CEFAZOLIN 2000 MG: 2 INJECTION, POWDER, FOR SOLUTION INTRAMUSCULAR; INTRAVENOUS at 10:29

## 2022-12-13 RX ADMIN — CLOPIDOGREL BISULFATE 75 MG: 75 TABLET ORAL at 08:41

## 2022-12-13 RX ADMIN — CEFAZOLIN 2000 MG: 2 INJECTION, POWDER, FOR SOLUTION INTRAMUSCULAR; INTRAVENOUS at 03:31

## 2022-12-13 RX ADMIN — ACETAMINOPHEN 650 MG: 325 TABLET ORAL at 05:53

## 2022-12-13 RX ADMIN — ALBUMIN HUMAN 25 G: 0.25 SOLUTION INTRAVENOUS at 08:30

## 2022-12-13 RX ADMIN — IPRATROPIUM BROMIDE AND ALBUTEROL SULFATE 1 AMPULE: 2.5; .5 SOLUTION RESPIRATORY (INHALATION) at 12:02

## 2022-12-13 RX ADMIN — FENTANYL CITRATE 25 MCG: 50 INJECTION, SOLUTION INTRAMUSCULAR; INTRAVENOUS at 03:39

## 2022-12-13 RX ADMIN — IPRATROPIUM BROMIDE AND ALBUTEROL SULFATE 1 AMPULE: 2.5; .5 SOLUTION RESPIRATORY (INHALATION) at 15:54

## 2022-12-13 RX ADMIN — MUPIROCIN: 20 OINTMENT TOPICAL at 08:44

## 2022-12-13 RX ADMIN — PANTOPRAZOLE SODIUM 40 MG: 40 TABLET, DELAYED RELEASE ORAL at 08:41

## 2022-12-13 RX ADMIN — MAGNESIUM OXIDE 400 MG (241.3 MG MAGNESIUM) TABLET 400 MG: TABLET at 08:41

## 2022-12-13 RX ADMIN — EZETIMIBE 10 MG: 10 TABLET ORAL at 21:13

## 2022-12-13 RX ADMIN — ACETAMINOPHEN 1000 MG: 500 TABLET ORAL at 21:13

## 2022-12-13 RX ADMIN — IPRATROPIUM BROMIDE AND ALBUTEROL SULFATE 1 AMPULE: 2.5; .5 SOLUTION RESPIRATORY (INHALATION) at 19:47

## 2022-12-13 RX ADMIN — SODIUM CHLORIDE, PRESERVATIVE FREE 10 ML: 5 INJECTION INTRAVENOUS at 08:43

## 2022-12-13 RX ADMIN — FENTANYL CITRATE 25 MCG: 50 INJECTION, SOLUTION INTRAMUSCULAR; INTRAVENOUS at 01:35

## 2022-12-13 RX ADMIN — INSULIN LISPRO 4 UNITS: 100 INJECTION, SOLUTION INTRAVENOUS; SUBCUTANEOUS at 11:35

## 2022-12-13 RX ADMIN — ATORVASTATIN CALCIUM 80 MG: 80 TABLET, FILM COATED ORAL at 21:13

## 2022-12-13 RX ADMIN — ACETAMINOPHEN 1000 MG: 500 TABLET ORAL at 13:15

## 2022-12-13 RX ADMIN — MAGNESIUM SULFATE HEPTAHYDRATE 2000 MG: 40 INJECTION, SOLUTION INTRAVENOUS at 08:37

## 2022-12-13 RX ADMIN — SODIUM CHLORIDE 5.17 UNITS/HR: 9 INJECTION, SOLUTION INTRAVENOUS at 00:05

## 2022-12-13 RX ADMIN — INSULIN LISPRO 4 UNITS: 100 INJECTION, SOLUTION INTRAVENOUS; SUBCUTANEOUS at 07:14

## 2022-12-13 RX ADMIN — ASPIRIN 81 MG: 81 TABLET, COATED ORAL at 08:41

## 2022-12-13 RX ADMIN — ACETAMINOPHEN 650 MG: 325 TABLET ORAL at 01:35

## 2022-12-13 RX ADMIN — CEFAZOLIN 2000 MG: 2 INJECTION, POWDER, FOR SOLUTION INTRAMUSCULAR; INTRAVENOUS at 18:36

## 2022-12-13 RX ADMIN — MAGNESIUM OXIDE 400 MG (241.3 MG MAGNESIUM) TABLET 400 MG: TABLET at 21:14

## 2022-12-13 RX ADMIN — MUPIROCIN: 20 OINTMENT TOPICAL at 21:17

## 2022-12-13 RX ADMIN — IPRATROPIUM BROMIDE AND ALBUTEROL SULFATE 1 AMPULE: 2.5; .5 SOLUTION RESPIRATORY (INHALATION) at 07:51

## 2022-12-13 RX ADMIN — SODIUM CHLORIDE, PRESERVATIVE FREE 10 ML: 5 INJECTION INTRAVENOUS at 21:17

## 2022-12-13 RX ADMIN — DOCUSATE SODIUM 50 MG AND SENNOSIDES 8.6 MG 1 TABLET: 8.6; 5 TABLET, FILM COATED ORAL at 21:13

## 2022-12-13 ASSESSMENT — PAIN DESCRIPTION - LOCATION
LOCATION: CHEST;BACK
LOCATION: BACK;CHEST
LOCATION: CHEST;BACK
LOCATION: BACK
LOCATION: BACK

## 2022-12-13 ASSESSMENT — PAIN DESCRIPTION - PAIN TYPE
TYPE: SURGICAL PAIN

## 2022-12-13 ASSESSMENT — PAIN SCALES - GENERAL
PAINLEVEL_OUTOF10: 8
PAINLEVEL_OUTOF10: 5
PAINLEVEL_OUTOF10: 9
PAINLEVEL_OUTOF10: 0
PAINLEVEL_OUTOF10: 8
PAINLEVEL_OUTOF10: 4
PAINLEVEL_OUTOF10: 6
PAINLEVEL_OUTOF10: 0
PAINLEVEL_OUTOF10: 6
PAINLEVEL_OUTOF10: 8
PAINLEVEL_OUTOF10: 3

## 2022-12-13 ASSESSMENT — PAIN DESCRIPTION - DESCRIPTORS
DESCRIPTORS: ACHING;DISCOMFORT;SORE
DESCRIPTORS: ACHING;DISCOMFORT;SORE
DESCRIPTORS: DISCOMFORT
DESCRIPTORS: ACHING;DISCOMFORT;SORE
DESCRIPTORS: ACHING;DISCOMFORT;SORE

## 2022-12-13 ASSESSMENT — PAIN DESCRIPTION - ONSET
ONSET: AWAKENED FROM SLEEP
ONSET: AWAKENED FROM SLEEP
ONSET: ON-GOING
ONSET: ON-GOING

## 2022-12-13 ASSESSMENT — PAIN DESCRIPTION - ORIENTATION
ORIENTATION: MID
ORIENTATION: MID;LOWER

## 2022-12-13 ASSESSMENT — PAIN - FUNCTIONAL ASSESSMENT
PAIN_FUNCTIONAL_ASSESSMENT: PREVENTS OR INTERFERES SOME ACTIVE ACTIVITIES AND ADLS

## 2022-12-13 ASSESSMENT — PAIN DESCRIPTION - FREQUENCY
FREQUENCY: CONTINUOUS

## 2022-12-13 NOTE — PROGRESS NOTES
Physical Therapy  Physical Therapy Initial Assessment     Name: Corey Fairchild  : 1960  MRN: [id number]      Date of Service: 2022    Evaluating PT:  Esther Mckeon, PT, DPT [id number]    Room #:  8714/7753-R  Diagnosis:  CAD in native artery [I25.10]  PMHx/PSHx:    Past Medical History:   Diagnosis Date    2019 novel coronavirus disease (COVID-19) 11/10/2021    Coronary artery disease involving native coronary artery of native heart without angina pectoris     Diabetes mellitus ([facility] [address]     Hyperlipidemia     Hypertension      Procedure/Surgery:   CABG x 3  Precautions:  Falls, Sternal, Chest tube x 2, O2  Equipment Needs:  TBD    SUBJECTIVE:    Pt lives with  in a 2 story home with 2 stairs to enter and no rail. Full flight of steps and no rail to bedroom. Pt can sleep on 1st floor. Pt ambulated without device and was independent PTA. OBJECTIVE:   Initial Evaluation  Date: 22 Treatment Short Term/ Long Term   Goals   AM-PAC 6 Clicks      Was pt agreeable to Eval/treatment? yes     Does pt have pain?  7/10 back pain     Bed Mobility  Rolling: NT  Supine to sit: ModA x 2 with HOB elevated  Sit to supine: NT  Scooting: MaxA  Tr   Transfers Sit to stand: Tr bed; [address] [place] chair  Stand to sit: ModA  Stand pivot: Tr no device  Independent   Ambulation   25 feet with Tr no device  >400 feet Independently   Stair negotiation: ascended and descended NT  >4 steps with 1 rail Mod Independent   ROM BUE:  Defer to OT note  BLE:  WFL     Strength BUE:  Defer to OT note  BLE:  4+/5  Increase by 1/3 MMT grade   Balance Sitting EOB:  Tr  Dynamic Standing:  Tr no device  Sitting EOB:  Independent  Dynamic Standing:  Independent     Pt is A & O x 4  CAM-ICU: NT  RASS: 0  Sensation:  No reported paresthesias  Edema:  None    Vitals:  Heart Rate at rest 74 bpm Heart Rate post session 74 bpm   SpO2 at rest 99% SpO2 post session 99%   Blood Pressure at rest 125/58 mmHg Blood Pressure post session 122/59 mmHg       Functional Status Score-Intensive Care Unit (FSS-ICU)   Rolling -/7   Supine to sit transfer 2/7   Unsupported sitting  4/7   Sit to stand transfers 3/7   Ambulation 1/7   Total  10/35     Therapeutic Exercises:  NA    Patient education  Pt educated on safety    Patient response to education:   Pt verbalized understanding Pt demonstrated skill Pt requires further education in this area   x x x     ASSESSMENT:    Conditions Requiring Skilled Therapeutic Intervention:    [x]Decreased strength     []Decreased ROM  [x]Decreased functional mobility  [x]Decreased balance   [x]Decreased endurance   [x]Decreased posture  []Decreased sensation  []Decreased coordination   []Decreased vision  []Decreased safety awareness   [x]Increased pain       Comments:  NP reported pt was medically stable. Pt was in bed upon arrival, agreeable to initial evaluation. Pt was educated on sternal precautions and PLB prior to activity. Increased assistance provided for bed mobility due to deconditioning and precautions. Pt completed transfer to chair initially. Increased assistance needed to stand from chair due to low surface. Pt completed short distance ambulation in room with decreased speed and unsteadiness. Fatigue limited activity. Pt was left in chair with all needs met and call light in reach. All lines remained intact. Treatment:  Patient practiced and was instructed in the following treatment:    Bed mobility training - pt given verbal and tactile cues to facilitate proper sequencing and safety during supine>sit as well as provided with physical assistance. Sitting EOB for >5 minutes for upright tolerance, postural awareness and BLE ROM  Transfer training - pt was given verbal and tactile cues to facilitate proper hand placement, technique and safety during sit to stand and stand to sit as well as provided with physical assistance.   Gait training- pt was given verbal and tactile cues to facilitate safety and balance during ambulation as well as provided with physical assistance. Pt's/ family goals   1. Return home    Prognosis is good for reaching above PT goals. Patient and or family understand(s) diagnosis, prognosis, and plan of care. yes    PHYSICAL THERAPY PLAN OF CARE:    PT POC is established based on physician order and patient diagnosis     Referring provider/PT Order:  Becka Westbrook PA-C/12/12/22 1100 PT eval and treat  Diagnosis:  CAD in native artery [I25.10]  Specific instructions for next treatment:  Progress activity    Current Treatment Recommendations:     [x] Strengthening to improve independence with functional mobility   [] ROM to improve independence with functional mobility   [x] Balance Training to improve static/dynamic balance and to reduce fall risk  [x] Endurance Training to improve activity tolerance during functional mobility   [x] Transfer Training to improve safety and independence with all functional transfers   [x] Gait Training to improve gait mechanics, endurance and asses need for appropriate assistive device  [x] Stair Training in preparation for safe discharge home and/or into the community   [] Positioning to prevent skin breakdown and contractures  [x] Safety and Education Training   [x] Patient/Caregiver Education   [] HEP  [] Other     PT long term treatment goals are located in above grid    Frequency of treatments: 2-5x/week x 1-2 weeks. Time in  0855  Time out  0920    Total Treatment Time  10 minutes     Evaluation Time includes thorough review of current medical information, gathering information on past medical history/social history and prior level of function, completion of standardized testing/informal observation of tasks, assessment of data and education on plan of care and goals.     CPT codes:  [] Low Complexity PT evaluation 19762  [x] Moderate Complexity PT evaluation 20154  [] High Complexity PT evaluation 44389  [] PT Re-evaluation [id number]  [] Gait training 13069 - minutes  [] Manual therapy 43050 - minutes  [x] Therapeutic activities 64523 10 minutes  [] Therapeutic exercises 14458 - minutes  [] Neuromuscular reeducation 01691 - minutes     Truman Melton, PT, DPT  [id number]

## 2022-12-13 NOTE — PROGRESS NOTES
CVICU Progress Note    Name: Emma Jc  MRN: [id number]    CC: Postoperative Critical Care Management     Indication for Surgery/Procedure: NSTEMI, CAD  AILIN LVEF:  60%    RVF:  NML     Important/Relevant PMH/PSH: Inferior infarction s/p RCA MIKEL in 2017, Uncontrolled DMII, HTN, chronic RBBB, hyperlipidemia, hypertriglyceridemia     Procedure/Surgeries: 12/12/2022  CABG x3 (LIMA-LAD, SVG-OM, SVG-PDA)  LAAL (35 mm AtriCure)  EVH  Sternal plating      Pacing wires:  Ventricular       Intake/Output Summary (Last 24 hours) at 12/13/2022 0803  Last data filed at 12/13/2022 0704  Gross per 24 hour   Intake 5154.34 ml   Output 3400 ml   Net 1754.34 ml       Recent Labs     12/11/22  0832 12/12/22  0806 12/12/22  1019 12/12/22  1100 12/13/22  0315   WBC 5.6  --   --  9.7 11.4   HGB 12.9  --   --  9.4* 9.1*   HCT 39.4   < > 27.0* 29.2* 28.4*     --   --  149 213    < > = values in this interval not displayed. Lab Results   Component Value Date/Time     12/13/2022 03:15 AM    K 4.1 12/13/2022 03:15 AM    K 4.2 12/12/2022 05:46 AM     12/13/2022 03:15 AM    CO2 20 12/13/2022 03:15 AM    BUN 9 12/13/2022 03:15 AM    CREATININE 0.5 12/13/2022 03:15 AM    GLUCOSE 156 12/13/2022 03:15 AM    GLUCOSE 186 11/08/2010 08:28 AM    CALCIUM 8.8 12/13/2022 03:15 AM         Physical Exam:    BP (!) 100/50   Pulse 68   Temp 97.4 °F (36.3 °C) (Temporal)   Resp 20   Ht 5' 6\" (1.676 m)   Wt 164 lb 14.5 oz (74.8 kg)   SpO2 99%   BMI 26.62 kg/m²       CXR Findings: 12/13/2022      FINDINGS:   Endotracheal nasogastric extubation since the prior study. Stable right-sided central venous catheter, bilateral chest tubes and sternotomy. There is increasing opacity at the left base which is likely atelectasis. Mild atelectasis suggested at the right base. --  CXR personally viewed and interpreted by ICU Nurse Practitioner, agree with above findings     General: Awake, alert.  Laying in bed, no complaints   Eyes: PERRL, anicteric   Pulmonary: Diminished bibasilar. No wheezes, no accessory muscle use noted   Cardiovascular:  RRR, no heaves or thrills on palpation  Tele: SR  Abdomen: Soft, nontender, + BS   Extremities: Palpable pulses all extremities, no edema   Neurologic/Psych: A&Ox3, HADDAD to command   Skin: Warm and dry  Incisions: MSI with louie dressing intact, SVG sites well approximated       Assessment/Plan: POD #1  1. NSTEMI/CAD S/p CABG x3 (LIMA-LAD, SVG-OM, SVG-PDA), LAAL (35 mm AtriCure)  - ASA, Plavix, Lipitor, Zetia  - Start BB once BP permits   - Perioperative Ancef  - Keep chest tubes to wall sxn today   - Sternal precautions   - PT/OT  - marginal UOP; 25g albumin this AM     2. Acute Pulmonary Insufficiency Following Surgery    - Expected 2/2 surgery  - Adequate o/v on 4L NC, encourage IS, ezpap per RT. Ambulate. Wean off oxygen as able      3. Postoperative hypotension   - resolved    4. Acute Post Operative Pain   - states she doesn't do well with oral pain medications, she would like to try tylenol only     5.  Uncontrolled DMII  -Hemoglobin A1c 12.1  -Evaluated by Endocrine service prior to surgery  -SSI/long acting, dosages will need adjusted as patient starts eating       VTE Prophylaxis: Pharmacologic/Mechanical:  Yes, SCDs   Line infection prevention: Can CVC or arterial line be removed: yes  Continued need for urinary catheter: no, remove prior to transfer    Dispo: Transfer to [facility] signed by ILA Garcia - CNP on 12/13/2022 at 8:03 AM

## 2022-12-13 NOTE — PROGRESS NOTES
[address]     TFRK:                                                               Patient Name: Fabián Peralta  MRN: [id number]  : 1960  Room: [address] [place] [address]    Evaluating OT: LEIGHA Fontaine/L [id number]    Referring Provider: Cecille Anaya PA-C   Specific Provider Orders/Date: OT eval and treat (22)      Diagnosis: CAD in native artery [I25.10]        Surgery/Procedures:   CABG x3     Pertinent Medical History: : Inferior infarction s/p RCA MIKEL in 2017, Uncontrolled DMII, HTN, chronic RBBB, hyperlipidemia, hypertriglyceridemia     *Precautions:  Fall Risk, sternal, O2, chest tubes x 2    Assessment of current deficits   [x]Functional mobility  [x]ADLs [x]Strength  []Cognition  [x]Functional transfers  [x]IADLs [x]Safety Awareness  [x]Endurance  []Fine Motor Coordination  [x]Balance       []Vision/perception  []Sensation    []Gross Motor Coordination [x]ROM  []Delirium                  [] Motor Control     []Communication     OT PLAN OF CARE   OT POC based on physician orders, patient diagnosis and results of clinical assessment.        Frequency/Duration: 1-3 days/wk for 1-2 weeks PRN     Specific OT Treatment to include:   ADL retraining/adapted techniques and AE recommendations to increase functional independence within precautions                    Energy conservation techniques to improve tolerance for selfcare routine   Functional transfer/mobility training/DME recommendations for increased independence, safety and fall prevention         Patient/family education to increase safety and functional independence within precautions             Environmental modifications for safe mobility and completion of ADLs                             Therapeutic activity to improve functional performance during ADLs Therapeutic exercise to improve tolerance and functional strength for ADLs   Balance retraining exercises/tasks for facilitation of postural control with dynamic challenges during ADLs . Recommended Adaptive Equipment:  LB dressing AE pending progress, shower seat, hand held shower    Home Living: Pt lives with  (home to assist)  in a 2 floor plan with 2+1 step(s) to enter and no rail(s); bed/bath on first floor  Bathroom setup: tub/shower; standard commode seat  Equipment owned: no DME    Prior Level of Function: IND with ADLs;  IND with IADLs. No device for ambulation. Driving: yes  Occupation: works part time (online business)     Pain Level: pt c/o 7/10 chest pain  this session    Cognition: A&O: 4/4    Follows 1-2 step commands appropriately. Memory: Good   Comprehension Good   Problem solving: Fair+/Goo   Judgement/safety: Fair+/Good               Communication skills: WFL           Vision: WFL               Glasses:yes                                                   Hearing: WFL     RASS: 0  CAM-ICU: (NT) Delirium     UE Assessment:  Hand Dominance: Right [x]  Left []     ROM Strength STM goal: PRN   RUE  Grossly WFL within precautions Not formally tested; grossly WFL              WNL for ADLS     LUE Grossly WFL within precautions Not formally tested; grossly WFL              WNL for ADLS       Sensation: No c/o numbness or tingling in extremities   Tone: WNL   Edema: WFL     Functional Assessment: AM-PAC Daily Activity Raw Score: 14/24   Initial Eval Status  Date: 12/13/22 Treatment Status  Date: STG=LTG  Time Frame: 5-7days   Feeding S; set up                       IND  while seated up in chair to increase activity tolerance        Grooming Min A; set up                       Yumiko   while standing sink level demonstrating G tolerance; G balance.      UB dressing/bathing Max A                       Min A   demonstrating G knowledge of precautions during tasks     LB dressing/bathing Max A                           Min A  using AE as needed for safe reach/ energy conservation       Toileting NT                       Min A     Bed Mobility  Supine to sit:   Mod A+2    Sit to supine:   NT                       Min A  in prep of ADL tasks & transfers   Functional Transfers Sit to stand: Min A  from higher bed surface; Mod A from lower chair surface    Stand to sit: Mod A                       Yumiko  sit<>stand/functional bathroom transfers using AD/DME as needed for balance and safety   Functional Mobility Min A  no device                        Yumiko   functional/bathroom mobility using AD as needed & demonstrating G safety     Balance Sitting:     Static:  SBA    Dynamic:Min A  Standing: Min A  Yumiko dynamic sitting balance; Yumiko dynamic standing balance  during ADL tasks & transfers   Endurance/  Activity Tolerance   F tolerance with light activity. G   tolerance with moderate activity/self care routine   Visual/  Perceptual               WFL                          Vitals:   HR at rest: 74 bpm HR at end of session: 74 bpm   Spo2 at rest:98% Spo2 at end of session 99%   BP at rest:126/59 mmHg BP at end of session 121/96 mmHg       Treatment: OT intervention provided this date includes:  Functional mobility: Instruction on sternal precautions to facilitate safe bed mobility & functional transfers. Pt required 2 person assist for safe mobility due to complexity of medical condition, medical lines and deconditioning. HOB elevated to assist. Pt reporting min light headedness throughout session. ADL retraining: Instruction on adapted dressing techniques/equipment (reacher, sock aid, LH sponge and hand held shower) to maintain sternal precautions during ADLs. Pt/ verbalize G understanding. Energy Conservation training: Education on postural awareness/positioning and breathing techniques to improve overall tolerance and participation in self care routine. Pt demonstrated fair tolerance. Review of recommended DME for fall prevention, bathroom safety & energy conservation. Pt/Family Education: Instruction with handouts on energy conservation and sternal precautions during functional activities for safe return home. Pt/family demonstrates G understanding. Line management and environmental modifications made prior to and end of session to ensure patient safety and to increase efficiency of session. Skilled monitoring of HR, O2 saturation, blood pressure and patient's response to activity performed throughout session. Comments: OK from RN to see patient. Upon arrival, patient supine in bed, motivated for OOB activity. At end of session, patient left seated in chair to increase activity tolerance. Call light within reach, all lines and tubes intact. Pt instructed on use of call light for assistance and fall prevention. Patient presents with decreased activity tolerance, dynamic balance, functional mobility limiting completion of ADLs and safety. Pt can benefit from continued skilled OT to increase safety, functional independence and quality of life. Rehab Potential: good for established goals    Patient / Family Goal: return to PLOF    Patient and/or family were instructed/educated on diagnosis, prognosis/goals and plan of care. Pt demonstrated G understanding. [] Malnutrition indicators have been identified and nursing has been notified to ensure a dietitian consult is ordered. Evaluation Complexity: Moderate    History: Expanded chart review of consults, imaging, and psychosocial history related to current functional performance. Exam: 5+ performance deficits identified limiting functional independence and safe return home   Assistance/Modification: Min/mod assistance or modifications required to perform tasks. May have comorbidities that affect occupational performance.     Time [id number]              Time Out: 0932       Total Treatment Time: 8          Min Units OT Eval Low 49113     OT Eval Medium 24624 X    OT Eval High [id number]     OT Re-Eval [id number]     Therapeutic Ex [id number]     Therapeutic Activities 77929     ADL/Self Care 65337 8 1   Orthotic Management 21501     Neuro Re-Ed 25563     Non-Billable Time       Evaluation time includes thorough review of current medical information, gathering information on past medical history/social history and prior level of function, completion of standardized testing/informal observation of tasks, assessment of data and development of POC/Goals.      Julianna Quiroz, OTR/L 3121

## 2022-12-13 NOTE — PROGRESS NOTES
ENDOCRINOLOGY PROGRESS NOTE      Date of admission: 12/7/2022  Date of service: 12/13/2022  Admitting physician: Hayde Sin MD   Primary Care Physician: Skylar Rahman MD  Consultant physician: Delano Parrish MD     Reason for the consultation:  Uncontrolled DM    History of Present Illness: The history is provided by the patient. Accuracy of the patient data is excellent    Corey Fairchild is a very pleasant 57 y.o. 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 The patient was in her usual state of health until one day before admission when started c/o CP and SOB. She initially presented to [facility] and found to have NSTEMI. The patient then transferred to [facility] for cardiac Cath which showed MVCAD and scheduled for CABG surgery Monday     Subjective   Pt was seen and examined this AM, no acute events, BG at goal. POD#1 s/p CABG x 3. Nausea, no vomiting. Not eating much. Had some jelly. For transfer out of the ICU.      Inpatient diet:   Carb Restricted diet     Point of care glucose monitoring   (Independently reviewed)   Recent Labs     12/12/22  2018 12/12/22 2123 12/12/22  2227 12/13/22  0001 12/13/22  0131 12/13/22  0319 12/13/22  0708 12/13/22  1126   GLUMET 131* 112* 101* 107* 90 139* 181* 165*   Scheduled Meds:   lidocaine  1 patch TransDERmal Daily    insulin glargine  10 Units SubCUTAneous Nightly    insulin lispro  0-8 Units SubCUTAneous TID     insulin lispro  0-4 Units SubCUTAneous Nightly    sodium chloride flush  5-40 mL IntraVENous 2 times per day    aspirin  81 mg Oral Daily    magnesium oxide  400 mg Oral BID    mupirocin   Nasal BID    sennosides-docusate sodium  1 tablet Oral BID    pantoprazole  40 mg Oral Daily    ceFAZolin (ANCEF) IVPB  2,000 mg IntraVENous Q8H    ipratropium-albuterol  1 ampule Inhalation Q4H WA clopidogrel  75 mg Oral Daily    ezetimibe  10 mg Oral Nightly    atorvastatin  80 mg Oral Nightly       PRN Meds:   acetaminophen, 1,000 mg, Q8H PRN  sodium chloride flush, 5-40 mL, PRN  sodium chloride, , PRN  oxyCODONE, 5 mg, Q4H PRN   Or  oxyCODONE, 10 mg, Q4H PRN  magnesium hydroxide, 30 mL, Daily PRN  bisacodyl, 10 mg, Daily PRN  potassium chloride, 20 mEq, PRN  magnesium sulfate, 2,000 mg, PRN  albumin human, 25 g, PRN  sodium chloride, 250 mL, Continuous PRN  glucose, 4 tablet, PRN  dextrose bolus, 125 mL, PRN   Or  dextrose bolus, 250 mL, PRN  glucagon (rDNA), 1 mg, PRN  dextrose, , Continuous PRN  acetaminophen, 650 mg, Q4H PRN  amiodarone, 400 mg, PRN  amiodarone bolus, 150 mg, PRN  magnesium sulfate, 2,000 mg, PRN  trimethobenzamide, 200 mg, Q6H PRN    Continuous Infusions:   sodium chloride 30 mL/hr at 12/13/22 0704    sodium chloride      sodium chloride      dextrose         Review of Systems  All systems reviewed. All negative except for symptoms mentioned in HPI     OBJECTIVE    BP (!) 100/50   Pulse 70   Temp 97.7 °F (36.5 °C) (Temporal)   Resp 21   Ht 5' 6\" (1.676 m)   Wt 164 lb 14.5 oz (74.8 kg)   SpO2 99%   BMI 26.62 kg/m²     Intake/Output Summary (Last 24 hours) at 12/13/2022 1538  Last data filed at 12/13/2022 1400  Gross per 24 hour   Intake 1519.34 ml   Output 1055 ml   Net 464.34 ml       Physical examination:  General: awake alert, oriented x3  HEENT: normocephalic non traumatic, no exophthalmos   Neck: supple, No thyroid tenderness,  Pulm: good equal air entry no added sounds  CVS: S1 + S2  Abd: soft lax, no tenderness  Skin: warm, no lesions, no rash.  No open wounds, no ulcers   Neuro: CN intact, sensation decreased bilateral , muscle power normal  Psych: normal mood, and affect    Review of Laboratory Data:  I personally reviewed the following labs:   Recent Labs     12/11/22  0832 12/12/22  0806 12/12/22  1019 12/12/22  1100 12/13/22  0315   WBC 5.6  --   --  9.7 11.4   RBC 4.89  --   --  3.58 3.47*   HGB 12.9  --   --  9.4* 9.1*   HCT 39.4   < > 27.0* 29.2* 28.4*   MCV 80.6  --   --  81.6 81.8   MCH 26.4  --   --  26.3 26.2   MCHC 32.7  --   --  32.2 32.0   RDW 13.7  --   --  13.7 14.3     --   --  149 213   MPV 10.7  --   --  10.3 10.7    < > = values in this interval not displayed. Recent Labs     12/11/22  0832 12/12/22  0546 12/12/22  0806 12/12/22  1019 12/12/22  1100 12/12/22  1506 12/12/22 2015 12/13/22  0315 12/13/22  1445    135  --   --  138  --   --  140  --    K 4.2 4.2   < > 3.3* 3.7   < > 4.2 4.1 3.8   CL 98 102  --   --  107  --   --  106  --    CO2 20* 20*  --   --  20*  --   --  20*  --    BUN 13 11  --   --  9  --   --  9  --    CREATININE 0.5 0.5   < > 0.6 0.5  --   --  0.5  --    GLUCOSE 357* 252*  --   --  209*  --   --  156*  --    CALCIUM 9.7 9.9  --   --  9.4  --   --  8.8  --    PROT 7.1  --   --   --   --   --   --   --   --    LABALBU 4.3  --   --   --   --   --   --   --   --    BILITOT 0.6  --   --   --   --   --   --   --   --    ALKPHOS 79  --   --   --   --   --   --   --   --    AST 21  --   --   --   --   --   --   --   --    ALT 33*  --   --   --   --   --   --   --   --     < > = values in this interval not displayed.      No results found for: BHYDBETO  Lab Results   Component Value Date/Time    LABA1C 12.1 12/08/2022 08:03 AM    LABA1C 12.0 12/07/2022 02:10 AM    LABA1C 12.3 12/06/2022 07:34 AM     No results found for: TSH, T4FREE, Z6ZTAHJ, FT3, D6IGPOQ, TSI, TPOABS, THGAB  Lab Results   Component Value Date/Time    LABA1C 12.1 12/08/2022 08:03 AM    GLUCOSE 156 12/13/2022 03:15 AM    GLUCOSE 186 11/08/2010 08:28 AM     Lab Results   Component Value Date/Time    TRIG 2,539 12/06/2022 07:34 AM    HDL 21 12/06/2022 07:34 AM    LDLCALC - 12/06/2022 07:34 AM    CHOL 449 12/06/2022 07:34 AM       Blood culture   No results found for: [facility]    Radiology:  XR CHEST PORTABLE   Final Result   Endotracheal nasogastric extubation with developing atelectasis at the bases,   left greater than right. XR CHEST PORTABLE   Final Result   Status post sternotomy. CT CHEST WO CONTRAST   Final Result   Normal ascending thoracic aorta without calcification or dissection. There   is diffuse coronary artery calcification. Multiple 2-7 mm nonspecific pulmonary nodules. Consider surveillance   according to [address]   Final Result   Atherosclerotic disease. No hemodynamically significant stenosis is   identified   Estimated stenosis by NASCET criteria in the proximal right carotid   artery is between 0% and 49%. Estimated stenosis by NASCET criteria in the proximal left carotid   artery is between 0% and 49%. US DUP LOWER EXTREMITY MAPPING BILAT VENOUS   Final Result   1. Bilateral venous mapping as described. 2. No superficial venous thrombosis visualized. VL CATERINA BILATERAL LIMITED 1-2 LEVELS   Final Result      XR CHEST PORTABLE    (Results Pending)       Medical Records/Labs/Images review:   I personally reviewed and summarized previous records   All labs and imaging were reviewed independently     [address], a 57 y.o.-old female seen today for inpatient diabetes management     Diabetes Mellitus type 2  Patient's diabetes is uncontrolled , A1c 12%  Pt was refusing insulin but after long discussion she agreed to start insulin at this time   will change diabetes regimen to:  Lantus 10u nightly  Hold Humalog 7u with meals while not eating. Decrease ss to medium dose sliding scale AC/HS. Continue glucose check with meals and at bedtime   Will titrate insulin dose based on the blood glucose trend & insulin requirement  Will arrange for patient to be seen in endocrinology clinic upon discharge for routine diabetes maintenance and prevention.     NSTEMI  S/p CABG surgery   Discussed the importance of controlling DM in management of CAD Dietary noncompliance  Discussed with patient the importance of eating consistent carbohydrate meals, avoiding high glycemic index food. Also, discussed with patient the risk and negative consequences of dietary noncompliance on blood glucose control, blood pressure and weight    Interdisciplinary plan for communication with healthcare providers:   Consult recommendations were discussed with the Primary Service/Nursing staff      The above issues were reviewed with the patient who understood and agreed with the plan. Thank you for allowing us to participate in the care of this patient. Please do not hesitate to contact us with any additional questions. I saw the patient and discussed the management with the resident physician Dr. Wilver Bonner MD.  I reviewed and agree with the findings and plan as documented in the resident's note    Migdalia Cruz MD  Endocrinologist, [facility]   [address] [place] [address], [address] [place] [id number]   Phone: [phone]  Fax: [phone]  --------------------------------------------  An electronic signature was used to authenticate this note.  Amara Espinal MD on 12/13/2022 at 3:38 PM

## 2022-12-13 NOTE — PLAN OF CARE
Problem: Chronic Conditions and Co-morbidities  Goal: Patient's chronic conditions and co-morbidity symptoms are monitored and maintained or improved  12/12/2022 2342 by Doris Asher RN  Outcome: Progressing  Flowsheets (Taken 12/12/2022 2000)  Care Plan - Patient's Chronic Conditions and Co-Morbidity Symptoms are Monitored and Maintained or Improved:   Monitor and assess patient's chronic conditions and comorbid symptoms for stability, deterioration, or improvement   Collaborate with multidisciplinary team to address chronic and comorbid conditions and prevent exacerbation or deterioration   Update acute care plan with appropriate goals if chronic or comorbid symptoms are exacerbated and prevent overall improvement and discharge  12/12/2022 1500 by Farzana Coleman RN  Outcome: Progressing  [facility] (Taken 12/9/2022 2045 by Taylor Bernal RN)  Care Plan - Patient's Chronic Conditions and Co-Morbidity Symptoms are Monitored and Maintained or Improved:   Monitor and assess patient's chronic conditions and comorbid symptoms for stability, deterioration, or improvement   Collaborate with multidisciplinary team to address chronic and comorbid conditions and prevent exacerbation or deterioration   Update acute care plan with appropriate goals if chronic or comorbid symptoms are exacerbated and prevent overall improvement and discharge     Problem: Discharge Planning  Goal: Discharge to home or other facility with appropriate resources  Outcome: Progressing  Flowsheets (Taken 12/12/2022 2000)  Discharge to home or other facility with appropriate resources:   Identify barriers to discharge with patient and caregiver   Identify discharge learning needs (meds, wound care, etc)     Problem: Safety - Adult  Goal: Free from fall injury  12/12/2022 2342 by Doris Asher RN  Outcome: Progressing  12/12/2022 1500 by Farzana Coleman RN  Outcome: Progressing  Flowsheets (Taken 12/12/2022 1500)  Free From Fall Injury: Instruct family/caregiver on patient safety     Problem: ABCDS Injury Assessment  Goal: Absence of physical injury  12/12/2022 2342 by Richmond Cervantes RN  Outcome: Progressing  12/12/2022 1500 by Michele Low RN  Outcome: Progressing  Flowsheets (Taken 12/7/2022 1715 by Jose Priest RN)  Absence of Physical Injury: Implement safety measures based on patient assessment     Problem: Pain  Goal: Verbalizes/displays adequate comfort level or baseline comfort level  12/12/2022 2342 by Richmond Cervantes RN  Outcome: Progressing  Flowsheets  Taken 12/12/2022 2300  Verbalizes/displays adequate comfort level or baseline comfort level: Assess pain using appropriate pain scale  Taken 12/12/2022 2233  Verbalizes/displays adequate comfort level or baseline comfort level: Administer analgesics based on type and severity of pain and evaluate response  Taken 12/12/2022 2030  Verbalizes/displays adequate comfort level or baseline comfort level: Assess pain using appropriate pain scale  Taken 12/12/2022 2001  Verbalizes/displays adequate comfort level or baseline comfort level: Administer analgesics based on type and severity of pain and evaluate response  Taken 12/12/2022 2000  Verbalizes/displays adequate comfort level or baseline comfort level: Assess pain using appropriate pain scale  12/12/2022 1500 by Michele Low RN  Outcome: Progressing  Flowsheets (Taken 12/7/2022 1712 by Jose Priest RN)  Verbalizes/displays adequate comfort level or baseline comfort level: Assess pain using appropriate pain scale     Problem: Respiratory - Adult  Goal: Achieves optimal ventilation and oxygenation  Outcome: Progressing  Flowsheets (Taken 12/12/2022 2000)  Achieves optimal ventilation and oxygenation:   Assess for changes in respiratory status   Assess for changes in mentation and behavior   Position to facilitate oxygenation and minimize respiratory effort   Oxygen supplementation based on oxygen saturation or arterial blood gases     Problem: Cardiovascular - Adult  Goal: Maintains optimal cardiac output and hemodynamic stability  Outcome: Progressing  Flowsheets (Taken 12/12/2022 2000)  Maintains optimal cardiac output and hemodynamic stability:   Monitor blood pressure and heart rate   Monitor urine output and notify Licensed Independent Practitioner for values outside of normal range   Assess for signs of decreased cardiac output   Administer fluid and/or volume expanders as ordered   Administer vasoactive medications as ordered  Goal: Absence of cardiac dysrhythmias or at baseline  Outcome: Progressing  Flowsheets (Taken 12/12/2022 2000)  Absence of cardiac dysrhythmias or at baseline:   Monitor cardiac rate and rhythm   Assess for signs of decreased cardiac output   Administer antiarrhythmia medication and electrolyte replacement as ordered     Problem: Skin/Tissue Integrity - Adult  Goal: Skin integrity remains intact  Outcome: Progressing  Flowsheets (Taken 12/12/2022 2000)  Skin Integrity Remains Intact:   Monitor for areas of redness and/or skin breakdown   Assess vascular access sites hourly  Goal: Incisions, wounds, or drain sites healing without S/S of infection  Outcome: Progressing     Problem: Gastrointestinal - Adult  Goal: Minimal or absence of nausea and vomiting  Outcome: Progressing  Goal: Maintains or returns to baseline bowel function  Outcome: Progressing     Problem: Genitourinary - Adult  Goal: Absence of urinary retention  Outcome: Progressing     Problem: Infection - Adult  Goal: Absence of infection at discharge  Outcome: Progressing  Flowsheets (Taken 12/12/2022 2000)  Absence of infection at discharge:   Assess and monitor for signs and symptoms of infection   Monitor lab/diagnostic results   Monitor all insertion sites i.e., indwelling lines, tubes and drains  Goal: Absence of infection during hospitalization  Outcome: Progressing  Flowsheets (Taken 12/12/2022 2000)  Absence of infection during hospitalization:   Assess and monitor for signs and symptoms of infection   Monitor lab/diagnostic results   Monitor all insertion sites i.e., indwelling lines, tubes and drains     Problem: Hematologic - Adult  Goal: Maintains hematologic stability  Outcome: Progressing  Flowsheets (Taken 12/12/2022 2000)  Maintains hematologic stability:   Assess for signs and symptoms of bleeding or hemorrhage   Monitor labs for bleeding or clotting disorders   Administer blood products/factors as ordered

## 2022-12-13 NOTE — PLAN OF CARE
Problem: Infection - Adult  Goal: Absence of infection at discharge  12/12/2022 2342 by Dajuan Mcwilliams RN  Outcome: Progressing  Flowsheets (Taken 12/12/2022 2000)  Absence of infection at discharge:   Assess and monitor for signs and symptoms of infection   Monitor lab/diagnostic results   Monitor all insertion sites i.e., indwelling lines, tubes and drains     Problem: Infection - Adult  Goal: Absence of infection during hospitalization  12/13/2022 0739 by jC Helms RN  Outcome: Progressing  Flowsheets (Taken 12/12/2022 2000 by Dajuan Mcwilliams RN)  Absence of infection during hospitalization:   Assess and monitor for signs and symptoms of infection   Monitor lab/diagnostic results   Monitor all insertion sites i.e., indwelling lines, tubes and drains  12/12/2022 2342 by Dajuan Mcwilliams RN  Outcome: Progressing  Flowsheets (Taken 12/12/2022 2000)  Absence of infection during hospitalization:   Assess and monitor for signs and symptoms of infection   Monitor lab/diagnostic results   Monitor all insertion sites i.e., indwelling lines, tubes and drains     Problem: Hematologic - Adult  Goal: Maintains hematologic stability  12/13/2022 0739 by Cj Helms RN  Outcome: Progressing  Flowsheets (Taken 12/12/2022 2000 by Dajuan Mcwilliams RN)  Maintains hematologic stability:   Assess for signs and symptoms of bleeding or hemorrhage   Monitor labs for bleeding or clotting disorders   Administer blood products/factors as ordered  12/12/2022 2342 by Dajuan Mcwilliams RN  Outcome: Progressing  Flowsheets (Taken 12/12/2022 2000)  Maintains hematologic stability:   Assess for signs and symptoms of bleeding or hemorrhage   Monitor labs for bleeding or clotting disorders   Administer blood products/factors as ordered

## 2022-12-13 NOTE — OP NOTE
[address]                  [address] [id number],  [facility]                                OPERATIVE REPORT    PATIENT NAME: Urvashi Anne                       :        1960  MED REC NO:   [id number]                            ROOM:       3816  ACCOUNT NO:   [de-identified]                           ADMIT DATE: 2022  PROVIDER:     Katiuska Miles DO    DATE OF PROCEDURE:  2022    PREOPERATIVE DIAGNOSIS:  Severe multivessel coronary artery disease. POSTOPERATIVE DIAGNOSIS:  Severe multivessel coronary artery disease. OPERATIONS PERFORMED:  1. Coronary artery bypass grafting x3 using left internal mammary  artery to left anterior descending artery, reverse saphenous vein graft  to the dominant obtuse marginal branch of circumflex, reverse saphenous  vein graft to the posterior descending artery. 2.  Left atrial appendage ligation using a 35-mm AtriCure appendage  clip. 3.  Lower extremity endoscopic vein harvest.  4.  Rigid sternal fixation with the [employer] plating system. SURGEON:  Katiuska Miles DO    ASSISTANTS:  1. Boni Washington NP, who assisted with all critical portions of the  procedure including closing as there was no qualified resident  available. 2.  JOURDAN Townsend, who harvested the vein. ANESTHESIA:  General.    ESTIMATED BLOOD LOSS:  Less than 50. COMPLICATIONS:  None. SPECIMENS:  None. INDICATIONS:  The patient is a 56-year-old female who presented to the  emergency department with complaints of chest pain. She was ultimately  diagnosed with a non-ST-elevation MI. She underwent full workup  including transthoracic echo and left heart cath. The left heart  catheterization was significant for severe multivessel coronary artery  disease. CT surgery was consulted for possible bypass surgery.   This  was explained to her as her best long-term option for survival.  She  ultimately agreed to undergo surgery and she presents today for coronary  artery bypass grafting. DESCRIPTION OF PROCEDURE:  After informed written consent had been  obtained, the patient was brought to the operating room, placed in the  supine position on the operating table. Monitoring lines as well as  Kim catheter and transesophageal echo probe were inserted after  induction of anesthesia. Preoperative echo demonstrated a normal RV  function, slightly diminished LV function with apical hypokinesis. Also  no valvular abnormalities. The surgical time-out was held, preoperative  antibiotics were administered and a standard midline sternotomy incision  was carried out. Subcutaneous tissues dissected and the sternum was  divided with a sternal saw. Simultaneously, lower extremity endoscopic  vein harvest was carried out in the usual fashion. The left internal  mammary artery was dissected free from the undersurface of the left  chest wall with a pedicled technique. All branches were clipped and  divided. The patient was administered systemic heparin for an adequate  ACT for bypass. The distal portion of the left internal mammary artery  was clipped and divided. It was noted to be a good conduit for bypass. Next, the mammary retractor was removed. Standard sternal retractor was  placed. Pericardium was opened. The ascending aorta was palpated and  noted to be free of atherosclerotic disease. Suitable areas for  cannulation, cross-clamping, and proximal anastomoses were apparent. Next, central cannulation was commenced in the usual fashion. This was  accomplished with right atrial and ascending aorta cannulas, followed by  insertion of antegrade and retrograde cardioplegia lines. The ACT was  verified. The patient was placed on cardiopulmonary bypass. The distal  targets were inspected and noted to be adequate. For the lateral wall,  there were two OM branches which were significantly diseased.   The more  distal of these two was the largest and more dominant and thus chosen  for bypass. Next, a cross-clamp was applied. Heart was arrested with  combination of antegrade and retrograde cardioplegia. We achieved an  excellent arrest and also administered maintenance doses every 15  minutes. First our attention was turned to the left atrial appendage,  given her high CHADS-VASc score and high likelihood of postoperative  AFib. The left atrial appendage was ligated by placing a 35-mm AtriCure  appendage clip at the base of the left atrial appendage. Next, our  attention was turned to the OM graft. After arteriotomy, an end-to-side  vein graft anastomosis was created here with a running 7-0 Prolene  suture. This was tested and had good flow and also was hemostatic. Next, our attention was turned to the right coronary artery system. The  best suitable target was the posterior descending artery. After  arteriotomy, an end-to-side vein graft anastomosis created here with  running 7-0 Prolene suture. This was tested and had good flow and also  was hemostatic. Next, our attention was turned to the midportion of the  LAD. After arteriotomy, an end-to-side LIMA to LAD anastomosis was  created here with a running 7-0 Prolene suture. This was tested and had  good runoff to the distal vessel and also adjacent diagonal branch. Next, our attention was turned to the proximal anastomoses. Two  standard aortotomies were fashioned in the ascending aorta with a 4.8-mm  punch. The vein grafts were measured to length and the proximal  anastomoses were both carried out with running 6-0 Prolene suture. Once  this was completed, a warm dose of blood was administered via the  retrograde cardioplegia cannula as a \"hot shot. \"  Once this was  completed, the bulldog clamp was removed from left internal mammary  artery. Cross-clamp removed from the aorta. The heart began to  reanimate the reanimate immediately.   A temporary ventricular pacing  wire was placed, however, not used secondary to the patient being in  normal sinus rhythm. Once all evidence of intracardiac air was gone,  the aortic root vent and retrograde cardioplegia cannulas were removed. The patient was then weaned from cardiopulmonary bypass without  difficulty. The venous cannula was removed. Protamine was administered  to normalize the ACT and the arterial cannula was removed as well. Post-bypass echo demonstrated similar to preop, no valvular  abnormalities, and slightly diminished LVEF because of apical  hypokinesis. Hemostasis was achieved. Sponge, needle, and instrument  counts were all correct. Three chest tubes were inserted, one in the  left pleural space, two in the mediastinal space. The sternum was  approximated with stainless steel wires. In addition to this, the  sternum underwent rigid fixation with the [employer] plating system with  one plate in the manubrium, one in the body of the sternum. The wound  was irrigated copiously and closed in multiple layers. The patient  tolerated the procedure well and was transferred to the ICU in stable  condition. Cardiopulmonary bypass time was 66 minutes and cross-clamp  time was 53 minutes.         Ha Taylor DO    D: 12/12/2022 13:03:41       T: 12/12/2022 13:07:02     [id number]  Job#: [id number]     Doc#: [id number]    CC:

## 2022-12-13 NOTE — PLAN OF CARE
Problem: Chronic Conditions and Co-morbidities  Goal: Patient's chronic conditions and co-morbidity symptoms are monitored and maintained or improved  12/13/2022 0739 by Brendan Blankenship RN  Outcome: Progressing  12/12/2022 2342 by Kathie Castillo RN  Outcome: Progressing  Flowsheets (Taken 12/12/2022 2000)  Care Plan - Patient's Chronic Conditions and Co-Morbidity Symptoms are Monitored and Maintained or Improved:   Monitor and assess patient's chronic conditions and comorbid symptoms for stability, deterioration, or improvement   Collaborate with multidisciplinary team to address chronic and comorbid conditions and prevent exacerbation or deterioration   Update acute care plan with appropriate goals if chronic or comorbid symptoms are exacerbated and prevent overall improvement and discharge     Problem: Discharge Planning  Goal: Discharge to home or other facility with appropriate resources  12/13/2022 0739 by Brendan Blankenship RN  Outcome: Progressing  12/12/2022 2342 by Kathie Castillo RN  Outcome: Progressing  Flowsheets (Taken 12/12/2022 2000)  Discharge to home or other facility with appropriate resources:   Identify barriers to discharge with patient and caregiver   Identify discharge learning needs (meds, wound care, etc)     Problem: Safety - Adult  Goal: Free from fall injury  12/13/2022 0739 by Brendan Blankenship RN  Outcome: Progressing  12/12/2022 2342 by Kathie Castillo RN  Outcome: Progressing     Problem: ABCDS Injury Assessment  Goal: Absence of physical injury  12/13/2022 0739 by Brendan Blankenship RN  Outcome: Progressing  12/12/2022 2342 by Kathie Castillo RN  Outcome: Progressing     Problem: Pain  Goal: Verbalizes/displays adequate comfort level or baseline comfort level  12/13/2022 0739 by Brendan Blankenship RN  Outcome: Progressing  Flowsheets  Taken 12/13/2022 0630 by Kathie Castillo RN  Verbalizes/displays adequate comfort level or baseline comfort level: Assess pain using appropriate pain scale  Taken 12/13/2022 0553 by Austen Su RN  Verbalizes/displays adequate comfort level or baseline comfort level: Administer analgesics based on type and severity of pain and evaluate response  Taken 12/13/2022 0400 by Austen Su RN  Verbalizes/displays adequate comfort level or baseline comfort level: Assess pain using appropriate pain scale  Taken 12/13/2022 0339 by Austen Su RN  Verbalizes/displays adequate comfort level or baseline comfort level: Administer analgesics based on type and severity of pain and evaluate response  Taken 12/13/2022 0200 by Austen Su RN  Verbalizes/displays adequate comfort level or baseline comfort level: Assess pain using appropriate pain scale  Taken 12/13/2022 0135 by Austen Su RN  Verbalizes/displays adequate comfort level or baseline comfort level: Administer analgesics based on type and severity of pain and evaluate response  Taken 12/13/2022 0000 by Austen Su RN  Verbalizes/displays adequate comfort level or baseline comfort level: Assess pain using appropriate pain scale  12/12/2022 2342 by Austen Su RN  Outcome: Progressing  Flowsheets  Taken 12/12/2022 2300  Verbalizes/displays adequate comfort level or baseline comfort level: Assess pain using appropriate pain scale  Taken 12/12/2022 2233  Verbalizes/displays adequate comfort level or baseline comfort level: Administer analgesics based on type and severity of pain and evaluate response  Taken 12/12/2022 2030  Verbalizes/displays adequate comfort level or baseline comfort level: Assess pain using appropriate pain scale  Taken 12/12/2022 2001  Verbalizes/displays adequate comfort level or baseline comfort level: Administer analgesics based on type and severity of pain and evaluate response  Taken 12/12/2022 2000  Verbalizes/displays adequate comfort level or baseline comfort level: Assess pain using appropriate pain scale     Problem: Respiratory - Adult  Goal: Achieves optimal ventilation and oxygenation  12/13/2022 0739 by Saint Milder, RN  Outcome: Progressing  12/12/2022 2342 by Rin Ceron RN  Outcome: Progressing  Flowsheets (Taken 12/12/2022 2000)  Achieves optimal ventilation and oxygenation:   Assess for changes in respiratory status   Assess for changes in mentation and behavior   Position to facilitate oxygenation and minimize respiratory effort   Oxygen supplementation based on oxygen saturation or arterial blood gases     Problem: Cardiovascular - Adult  Goal: Maintains optimal cardiac output and hemodynamic stability  12/13/2022 0739 by Saint Milder, RN  Outcome: Progressing  12/12/2022 2342 by Rin Ceron RN  Outcome: Progressing  Flowsheets (Taken 12/12/2022 2000)  Maintains optimal cardiac output and hemodynamic stability:   Monitor blood pressure and heart rate   Monitor urine output and notify Licensed Independent Practitioner for values outside of normal range   Assess for signs of decreased cardiac output   Administer fluid and/or volume expanders as ordered   Administer vasoactive medications as ordered  Goal: Absence of cardiac dysrhythmias or at baseline  12/13/2022 0739 by Saint Milder, RN  Outcome: Progressing  12/12/2022 2342 by Rin Ceron RN  Outcome: Progressing  Flowsheets (Taken 12/12/2022 2000)  Absence of cardiac dysrhythmias or at baseline:   Monitor cardiac rate and rhythm   Assess for signs of decreased cardiac output   Administer antiarrhythmia medication and electrolyte replacement as ordered     Problem: Skin/Tissue Integrity - Adult  Goal: Skin integrity remains intact  12/13/2022 0739 by Saint Milder, RN  Outcome: Progressing  12/12/2022 2342 by Rin Ceron RN  Outcome: Progressing  Flowsheets (Taken 12/12/2022 2000)  Skin Integrity Remains Intact:   Monitor for areas of redness and/or skin breakdown   Assess vascular access sites hourly  Goal: Incisions, wounds, or drain sites healing without S/S of infection  12/13/2022 0739 by Kelsey Mccray RN  Outcome: Progressing  12/12/2022 2342 by Austen Su RN  Outcome: Progressing     Problem: Gastrointestinal - Adult  Goal: Minimal or absence of nausea and vomiting  12/12/2022 2342 by Austen Su RN  Outcome: Progressing  Goal: Maintains or returns to baseline bowel function  12/12/2022 2342 by Austen Su RN  Outcome: Progressing     Problem: Genitourinary - Adult  Goal: Absence of urinary retention  12/13/2022 0739 by Kelsey Mccray RN  Outcome: Progressing  12/12/2022 2342 by Austen Su RN  Outcome: Progressing  Goal: Urinary catheter remains patent  Outcome: Progressing     Problem: Infection - Adult  Goal: Absence of infection at discharge  12/12/2022 2342 by Austen Su RN  Outcome: Progressing  Flowsheets (Taken 12/12/2022 2000)  Absence of infection at discharge:   Assess and monitor for signs and symptoms of infection   Monitor lab/diagnostic results   Monitor all insertion sites i.e., indwelling lines, tubes and drains  Goal: Absence of infection during hospitalization  12/12/2022 2342 by Austen Su RN  Outcome: Progressing  Flowsheets (Taken 12/12/2022 2000)  Absence of infection during hospitalization:   Assess and monitor for signs and symptoms of infection   Monitor lab/diagnostic results   Monitor all insertion sites i.e., indwelling lines, tubes and drains     Problem: Hematologic - Adult  Goal: Maintains hematologic stability  12/12/2022 2342 by Austen Su RN  Outcome: Progressing  Flowsheets (Taken 12/12/2022 2000)  Maintains hematologic stability:   Assess for signs and symptoms of bleeding or hemorrhage   Monitor labs for bleeding or clotting disorders   Administer blood products/factors as ordered

## 2022-12-13 NOTE — CARE COORDINATION
Pod #1 s/p Cabg x3. Spoke with pt re: dc plan. She reports that her  will be home with her 24/7. She is aware of need to ambulate 400 ft before discharging to home. Bedroom is on 2nd floor, but she plans to stay in a lift recliner in the first floor initially. Discussed Hhc. Pt states that her  already checked to see if hhc is covered through their [employer] ,and it is. Referral made to Lonnie Kimball at [facility]. They have an opening for Sun 12/18.

## 2022-12-13 NOTE — PROGRESS NOTES
DAILY PROGRESS NOTE -[facility] CARDIOLOGY    SUBJECTIVE:    Post CABG with LIMA-LAD, veins to obtuse marginal and PDA along with left atrial appendage ligation. Preoperative ejection fraction 45 to 50% with mild mid-distal anterior and apical hypokinesis. Looking pretty good this morning and without complaint of chest pain, worsening dyspnea, palpitations, syncope, presyncope. Already extubated and off of pressors. Hypertension, severe hypertriglyceridemia, diabetes, CAD post inferior infarct and RCA drug-eluting stent 2017. OBJECTIVE:    Her vital signs were reviewed today. Blood pressure 100/50. Rest of vitals as below.     Vitals:    12/13/22 0500   BP:    Pulse: 70   Resp: 18   Temp:    SpO2: 97%       Scheduled Meds:   sodium chloride flush  5-40 mL IntraVENous 2 times per day    aspirin  81 mg Oral Daily    chlorhexidine  15 mL Mouth/Throat BID    magnesium oxide  400 mg Oral BID    mupirocin   Nasal BID    sennosides-docusate sodium  1 tablet Oral BID    pantoprazole  40 mg Oral Daily    ceFAZolin (ANCEF) IVPB  2,000 mg IntraVENous Q8H    ipratropium-albuterol  1 ampule Inhalation Q4H WA    insulin glargine  0.15 Units/kg SubCUTAneous Nightly    insulin lispro  0-16 Units SubCUTAneous Q4H    clopidogrel  75 mg Oral Daily    ezetimibe  10 mg Oral Nightly    albumin human  25 g IntraVENous Once    atorvastatin  80 mg Oral Nightly     Continuous Infusions:   sodium chloride 50 mL/hr at 12/12/22 1500    sodium chloride      propofol Stopped (12/12/22 1158)    sodium chloride      norepinephrine 1 mcg/min (12/13/22 0315)    nitroprusside (NIPRIDE) 50 mg in D5W infusion      insulin Stopped (12/13/22 0130)    dextrose       PRN Meds:.sodium chloride flush, sodium chloride, acetaminophen, oxyCODONE **OR** oxyCODONE, fentanNYL **OR** fentanNYL, meperidine, propofol, magnesium hydroxide, bisacodyl, potassium chloride, magnesium sulfate, calcium chloride IVPB **OR** calcium chloride IVPB, albumin human, sodium chloride, norepinephrine, nitroprusside (NIPRIDE) 50 mg in D5W infusion, glucose, dextrose bolus **OR** dextrose bolus, glucagon (rDNA), dextrose, acetaminophen, amiodarone, amiodarone bolus, magnesium sulfate, trimethobenzamide    REVIEW OF SYSTEMS:     No pruritus, rash, bruising. Cardiac and pulmonary symptoms per HPI. No nausea, vomiting, abdominal pain, GI bleeding, change in bowel habits. No dysuria, urinary frequency, urgency, hematuria, flank pain. Joint pain but no muscle soreness, stiffness, aching. No headache, speech disturbance, lateralizing neurologic deficit. No hemoptysis, epistaxis, easy bruising. Persistent anxiety, but no depression. No symptoms of hypothyroidism, hyperthyroidism, diabetes, heat or cold intolerance. FAMILY HISTORY: Negative for CAD in first-degree relatives. SOCIAL HISTORY: Negative for alcohol, tobacco, or illicit drug use. PHYSICAL EXAM:    General Appearance: in no acute distress  Neck:  no bruits  Lungs:  Normal expansion. Clear to auscultation. No rales, rhonchi, or wheezing. Heart:  Heart sounds are normal.  Regular rate and rhythm without murmur, gallop or rub. Abdomen:  Soft, non-tender. Extremities: Extremities warm to touch, pink, with no edema. Neuro/musculosketal:  Unremarkable. LABS:    Recent Labs     12/12/22  0546 12/12/22  0806 12/12/22  1019 12/12/22  1100 12/13/22  0315     --   --  138 140   CREATININE 0.5   < > 0.6 0.5 0.5    < > = values in this interval not displayed. Recent Labs     12/11/22  0832 12/12/22  1100 12/13/22  0315   HGB 12.9 9.4* 9.1*       Recent Labs     12/11/22  0832 12/12/22  1100 12/13/22  0315   INR 1.1 1.3 1.3           IMPRESSION:    1.  CAD post non-STEMI with surgical disease-looking very good after three-vessel CABG and left atrial appendage ligation. Off of pressors. Continue indefinite aspirin. 2.  Ischemic cardiomyopathy-echocardiogram as above.   Restart metoprolol succinate when taking p.o. Has a cough from lisinopril and plan to use valsartan post surgery for cardioprotection. 3.  Severe hyperlipidemia-triglycerides 1200 and LDL unable to be calculated. High-dose atorvastatin along with Zetia to achieve LDL below 70.    4.  Diabetes-per hospitalist team.    5.  Continue to follow.

## 2022-12-14 ENCOUNTER — APPOINTMENT (OUTPATIENT)
Dept: GENERAL RADIOLOGY | Age: 62
DRG: 233 | End: 2022-12-14
Attending: INTERNAL MEDICINE

## 2022-12-14 LAB
ANION GAP SERPL CALCULATED.3IONS-SCNC: 12 MMOL/L (ref 7–16)
B.E.: -2.2 MMOL/L (ref -3–3)
BUN BLDV-MCNC: 7 MG/DL (ref 6–23)
CALCIUM IONIZED: 1.26 MMOL/L (ref 1.15–1.33)
CALCIUM SERPL-MCNC: 8.7 MG/DL (ref 8.6–10.2)
CHLORIDE BLD-SCNC: 104 MMOL/L (ref 98–107)
CO2: 19 MMOL/L (ref 22–29)
COHB: 0.4 % (ref 0–1.5)
CREAT SERPL-MCNC: 0.4 MG/DL (ref 0.5–1)
CRITICAL: ABNORMAL
DATE ANALYZED: ABNORMAL
DATE OF COLLECTION: ABNORMAL
GFR SERPL CREATININE-BSD FRML MDRD: >60 ML/MIN/1.73
GLUCOSE BLD-MCNC: 210 MG/DL (ref 74–99)
HCO3: 20.5 MMOL/L (ref 22–26)
HCT VFR BLD CALC: 27.8 % (ref 34–48)
HEMOGLOBIN: 9.2 G/DL (ref 11.5–15.5)
HHB: 7.2 % (ref 0–5)
LAB: ABNORMAL
Lab: ABNORMAL
MAGNESIUM: 2.1 MG/DL (ref 1.6–2.6)
MCH RBC QN AUTO: 26.5 PG (ref 26–35)
MCHC RBC AUTO-ENTMCNC: 33.1 % (ref 32–34.5)
MCV RBC AUTO: 80.1 FL (ref 80–99.9)
METER GLUCOSE: 236 MG/DL (ref 74–99)
METER GLUCOSE: 240 MG/DL (ref 74–99)
METER GLUCOSE: 242 MG/DL (ref 74–99)
METER GLUCOSE: 262 MG/DL (ref 74–99)
METHB: 0.1 % (ref 0–1.5)
MODE: ABNORMAL
O2 SATURATION: 92.8 % (ref 92–98.5)
O2HB: 92.3 % (ref 94–97)
OPERATOR ID: 7221
PATIENT TEMP: 37 C
PCO2: 28.3 MMHG (ref 35–45)
PDW BLD-RTO: 14.6 FL (ref 11.5–15)
PH BLOOD GAS: 7.48 (ref 7.35–7.45)
PLATELET # BLD: 168 E9/L (ref 130–450)
PMV BLD AUTO: 10.8 FL (ref 7–12)
PO2: 63.3 MMHG (ref 75–100)
POTASSIUM SERPL-SCNC: 4.1 MMOL/L (ref 3.5–5)
RBC # BLD: 3.47 E12/L (ref 3.5–5.5)
SODIUM BLD-SCNC: 135 MMOL/L (ref 132–146)
SOURCE, BLOOD GAS: ABNORMAL
THB: 10.4 G/DL (ref 11.5–16.5)
TIME ANALYZED: 416
WBC # BLD: 10.3 E9/L (ref 4.5–11.5)

## 2022-12-14 PROCEDURE — 93798 PHYS/QHP OP CAR RHAB W/ECG: CPT

## 2022-12-14 PROCEDURE — 37799 UNLISTED PX VASCULAR SURGERY: CPT

## 2022-12-14 PROCEDURE — 2580000003 HC RX 258: Performed by: PHYSICIAN ASSISTANT

## 2022-12-14 PROCEDURE — 6360000002 HC RX W HCPCS: Performed by: PHYSICIAN ASSISTANT

## 2022-12-14 PROCEDURE — 6370000000 HC RX 637 (ALT 250 FOR IP): Performed by: NURSE PRACTITIONER

## 2022-12-14 PROCEDURE — 85027 COMPLETE CBC AUTOMATED: CPT

## 2022-12-14 PROCEDURE — 2140000000 HC CCU INTERMEDIATE R&B

## 2022-12-14 PROCEDURE — 82330 ASSAY OF CALCIUM: CPT

## 2022-12-14 PROCEDURE — 83735 ASSAY OF MAGNESIUM: CPT

## 2022-12-14 PROCEDURE — 82805 BLOOD GASES W/O2 SATURATION: CPT

## 2022-12-14 PROCEDURE — 82962 GLUCOSE BLOOD TEST: CPT

## 2022-12-14 PROCEDURE — 94640 AIRWAY INHALATION TREATMENT: CPT

## 2022-12-14 PROCEDURE — 97535 SELF CARE MNGMENT TRAINING: CPT

## 2022-12-14 PROCEDURE — 6370000000 HC RX 637 (ALT 250 FOR IP): Performed by: INTERNAL MEDICINE

## 2022-12-14 PROCEDURE — 36415 COLL VENOUS BLD VENIPUNCTURE: CPT

## 2022-12-14 PROCEDURE — 2700000000 HC OXYGEN THERAPY PER DAY

## 2022-12-14 PROCEDURE — S5553 INSULIN LONG ACTING 5 U: HCPCS | Performed by: INTERNAL MEDICINE

## 2022-12-14 PROCEDURE — 6370000000 HC RX 637 (ALT 250 FOR IP): Performed by: PHYSICIAN ASSISTANT

## 2022-12-14 PROCEDURE — 71045 X-RAY EXAM CHEST 1 VIEW: CPT

## 2022-12-14 PROCEDURE — 97530 THERAPEUTIC ACTIVITIES: CPT

## 2022-12-14 PROCEDURE — 2580000003 HC RX 258: Performed by: NURSE PRACTITIONER

## 2022-12-14 PROCEDURE — 80048 BASIC METABOLIC PNL TOTAL CA: CPT

## 2022-12-14 RX ORDER — POTASSIUM CHLORIDE 20 MEQ/1
20 TABLET, EXTENDED RELEASE ORAL PRN
Status: DISCONTINUED | OUTPATIENT
Start: 2022-12-14 | End: 2022-12-15 | Stop reason: HOSPADM

## 2022-12-14 RX ORDER — ASPIRIN 81 MG/1
81 TABLET ORAL DAILY
Status: DISCONTINUED | OUTPATIENT
Start: 2022-12-14 | End: 2022-12-15 | Stop reason: HOSPADM

## 2022-12-14 RX ORDER — BISACODYL 10 MG
10 SUPPOSITORY, RECTAL RECTAL DAILY PRN
Status: DISCONTINUED | OUTPATIENT
Start: 2022-12-14 | End: 2022-12-15 | Stop reason: HOSPADM

## 2022-12-14 RX ORDER — INSULIN GLARGINE-YFGN 100 [IU]/ML
18 INJECTION, SOLUTION SUBCUTANEOUS NIGHTLY
Status: DISCONTINUED | OUTPATIENT
Start: 2022-12-14 | End: 2022-12-15 | Stop reason: HOSPADM

## 2022-12-14 RX ORDER — METOPROLOL SUCCINATE 25 MG/1
25 TABLET, EXTENDED RELEASE ORAL DAILY
Status: DISCONTINUED | OUTPATIENT
Start: 2022-12-15 | End: 2022-12-15 | Stop reason: HOSPADM

## 2022-12-14 RX ORDER — ASCORBIC ACID 500 MG
500 TABLET ORAL 2 TIMES DAILY
Status: DISCONTINUED | OUTPATIENT
Start: 2022-12-14 | End: 2022-12-15 | Stop reason: HOSPADM

## 2022-12-14 RX ORDER — FOLIC ACID 1 MG/1
1 TABLET ORAL DAILY
Status: DISCONTINUED | OUTPATIENT
Start: 2022-12-14 | End: 2022-12-15 | Stop reason: HOSPADM

## 2022-12-14 RX ORDER — MAGNESIUM SULFATE IN WATER 40 MG/ML
2000 INJECTION, SOLUTION INTRAVENOUS PRN
Status: DISCONTINUED | OUTPATIENT
Start: 2022-12-14 | End: 2022-12-15 | Stop reason: HOSPADM

## 2022-12-14 RX ORDER — AMIODARONE HYDROCHLORIDE 200 MG/1
400 TABLET ORAL PRN
Status: DISCONTINUED | OUTPATIENT
Start: 2022-12-14 | End: 2022-12-15 | Stop reason: HOSPADM

## 2022-12-14 RX ORDER — SENNA AND DOCUSATE SODIUM 50; 8.6 MG/1; MG/1
1 TABLET, FILM COATED ORAL 2 TIMES DAILY
Status: DISCONTINUED | OUTPATIENT
Start: 2022-12-14 | End: 2022-12-15 | Stop reason: HOSPADM

## 2022-12-14 RX ORDER — TRAMADOL HYDROCHLORIDE 50 MG/1
50 TABLET ORAL EVERY 6 HOURS PRN
Status: DISCONTINUED | OUTPATIENT
Start: 2022-12-14 | End: 2022-12-15 | Stop reason: HOSPADM

## 2022-12-14 RX ORDER — FERROUS SULFATE 325(65) MG
325 TABLET ORAL 2 TIMES DAILY WITH MEALS
Status: DISCONTINUED | OUTPATIENT
Start: 2022-12-14 | End: 2022-12-15 | Stop reason: HOSPADM

## 2022-12-14 RX ORDER — INSULIN GLARGINE-YFGN 100 [IU]/ML
20 INJECTION, SOLUTION SUBCUTANEOUS NIGHTLY
Status: DISCONTINUED | OUTPATIENT
Start: 2022-12-14 | End: 2022-12-14

## 2022-12-14 RX ORDER — PANTOPRAZOLE SODIUM 40 MG/1
40 TABLET, DELAYED RELEASE ORAL DAILY
Status: DISCONTINUED | OUTPATIENT
Start: 2022-12-14 | End: 2022-12-15 | Stop reason: HOSPADM

## 2022-12-14 RX ADMIN — CLOPIDOGREL BISULFATE 75 MG: 75 TABLET ORAL at 08:19

## 2022-12-14 RX ADMIN — ATORVASTATIN CALCIUM 80 MG: 80 TABLET, FILM COATED ORAL at 20:17

## 2022-12-14 RX ADMIN — IPRATROPIUM BROMIDE AND ALBUTEROL SULFATE 1 AMPULE: 2.5; .5 SOLUTION RESPIRATORY (INHALATION) at 12:47

## 2022-12-14 RX ADMIN — BISACODYL 5 MG: 5 TABLET, COATED ORAL at 11:54

## 2022-12-14 RX ADMIN — INSULIN GLARGINE-YFGN 18 UNITS: 100 INJECTION, SOLUTION SUBCUTANEOUS at 20:16

## 2022-12-14 RX ADMIN — MUPIROCIN: 20 OINTMENT TOPICAL at 08:22

## 2022-12-14 RX ADMIN — DOCUSATE SODIUM 50 MG AND SENNOSIDES 8.6 MG 1 TABLET: 8.6; 5 TABLET, FILM COATED ORAL at 08:18

## 2022-12-14 RX ADMIN — IPRATROPIUM BROMIDE AND ALBUTEROL SULFATE 1 AMPULE: 2.5; .5 SOLUTION RESPIRATORY (INHALATION) at 21:41

## 2022-12-14 RX ADMIN — MAGNESIUM OXIDE 400 MG (241.3 MG MAGNESIUM) TABLET 400 MG: TABLET at 08:22

## 2022-12-14 RX ADMIN — FOLIC ACID 1 MG: 1 TABLET ORAL at 11:54

## 2022-12-14 RX ADMIN — IPRATROPIUM BROMIDE AND ALBUTEROL SULFATE 1 AMPULE: 2.5; .5 SOLUTION RESPIRATORY (INHALATION) at 16:01

## 2022-12-14 RX ADMIN — MAGNESIUM OXIDE 400 MG (241.3 MG MAGNESIUM) TABLET 400 MG: TABLET at 20:17

## 2022-12-14 RX ADMIN — OXYCODONE HYDROCHLORIDE AND ACETAMINOPHEN 500 MG: 500 TABLET ORAL at 11:54

## 2022-12-14 RX ADMIN — EZETIMIBE 10 MG: 10 TABLET ORAL at 21:31

## 2022-12-14 RX ADMIN — INSULIN LISPRO 2 UNITS: 100 INJECTION, SOLUTION INTRAVENOUS; SUBCUTANEOUS at 08:20

## 2022-12-14 RX ADMIN — FERROUS SULFATE TAB 325 MG (65 MG ELEMENTAL FE) 325 MG: 325 (65 FE) TAB at 17:27

## 2022-12-14 RX ADMIN — SODIUM CHLORIDE, PRESERVATIVE FREE 10 ML: 5 INJECTION INTRAVENOUS at 20:17

## 2022-12-14 RX ADMIN — PANTOPRAZOLE SODIUM 40 MG: 40 TABLET, DELAYED RELEASE ORAL at 08:18

## 2022-12-14 RX ADMIN — FERROUS SULFATE TAB 325 MG (65 MG ELEMENTAL FE) 325 MG: 325 (65 FE) TAB at 11:54

## 2022-12-14 RX ADMIN — DOCUSATE SODIUM 50 MG AND SENNOSIDES 8.6 MG 1 TABLET: 8.6; 5 TABLET, FILM COATED ORAL at 20:17

## 2022-12-14 RX ADMIN — IPRATROPIUM BROMIDE AND ALBUTEROL SULFATE 1 AMPULE: 2.5; .5 SOLUTION RESPIRATORY (INHALATION) at 08:26

## 2022-12-14 RX ADMIN — SODIUM CHLORIDE, PRESERVATIVE FREE 10 ML: 5 INJECTION INTRAVENOUS at 08:21

## 2022-12-14 RX ADMIN — MAGNESIUM HYDROXIDE 30 ML: 400 SUSPENSION ORAL at 11:54

## 2022-12-14 RX ADMIN — OXYCODONE HYDROCHLORIDE AND ACETAMINOPHEN 500 MG: 500 TABLET ORAL at 20:17

## 2022-12-14 RX ADMIN — ACETAMINOPHEN 1000 MG: 500 TABLET ORAL at 20:17

## 2022-12-14 RX ADMIN — INSULIN LISPRO 4 UNITS: 100 INJECTION, SOLUTION INTRAVENOUS; SUBCUTANEOUS at 11:54

## 2022-12-14 RX ADMIN — CEFAZOLIN 2000 MG: 2 INJECTION, POWDER, FOR SOLUTION INTRAMUSCULAR; INTRAVENOUS at 03:38

## 2022-12-14 RX ADMIN — INSULIN LISPRO 2 UNITS: 100 INJECTION, SOLUTION INTRAVENOUS; SUBCUTANEOUS at 17:27

## 2022-12-14 RX ADMIN — ACETAMINOPHEN 1000 MG: 500 TABLET ORAL at 05:30

## 2022-12-14 RX ADMIN — ASPIRIN 81 MG: 81 TABLET, COATED ORAL at 08:19

## 2022-12-14 ASSESSMENT — PAIN DESCRIPTION - FREQUENCY
FREQUENCY: CONTINUOUS
FREQUENCY: INTERMITTENT

## 2022-12-14 ASSESSMENT — PAIN DESCRIPTION - ONSET
ONSET: ON-GOING
ONSET: ON-GOING

## 2022-12-14 ASSESSMENT — PAIN DESCRIPTION - PAIN TYPE
TYPE: ACUTE PAIN;SURGICAL PAIN
TYPE: CHRONIC PAIN

## 2022-12-14 ASSESSMENT — PAIN - FUNCTIONAL ASSESSMENT
PAIN_FUNCTIONAL_ASSESSMENT: PREVENTS OR INTERFERES WITH MANY ACTIVE NOT PASSIVE ACTIVITIES
PAIN_FUNCTIONAL_ASSESSMENT: PREVENTS OR INTERFERES SOME ACTIVE ACTIVITIES AND ADLS

## 2022-12-14 ASSESSMENT — PAIN SCALES - GENERAL
PAINLEVEL_OUTOF10: 0
PAINLEVEL_OUTOF10: 7
PAINLEVEL_OUTOF10: 0
PAINLEVEL_OUTOF10: 4
PAINLEVEL_OUTOF10: 5
PAINLEVEL_OUTOF10: 5

## 2022-12-14 ASSESSMENT — PAIN DESCRIPTION - LOCATION
LOCATION: BACK;CHEST
LOCATION: BACK

## 2022-12-14 ASSESSMENT — PAIN DESCRIPTION - ORIENTATION
ORIENTATION: MID;LOWER
ORIENTATION: MID;LOWER

## 2022-12-14 ASSESSMENT — PAIN DESCRIPTION - DESCRIPTORS
DESCRIPTORS: SORE;ACHING;DISCOMFORT
DESCRIPTORS: SPASM

## 2022-12-14 NOTE — PROGRESS NOTES
DAILY PROGRESS NOTE -[facility] CARDIOLOGY    SUBJECTIVE:    Looking good this morning. Sitting in a bedside chair with no new complaints. Sinus rhythm on telemetry. CABG yesterday 12/13 with LIMA-LAD, veins to place marginal and PDA and left atrial appendage ligation after non-STEMI. Preoperative ejection fraction 45 to 50% with mid-distal anterior and apical hypokinesis. Hypertension, severe hypertriglyceridemia, diabetes, CAD post inferior infarct and RCA drug-eluting stent 2017. OBJECTIVE:    Her vital signs were reviewed today. Blood pressure 120/70, pulse 75 and regular, respiratory rate 16. Vitals:    12/14/22 1114   BP:    Pulse:    Resp:    Temp:    SpO2: 95%       Scheduled Meds:   aspirin  81 mg Oral Daily    bisacodyl  5 mg Oral Daily    sennosides-docusate sodium  1 tablet Oral BID    magnesium hydroxide  30 mL Oral Daily    ferrous sulfate  325 mg Oral BID WC    vitamin C  500 mg Oral BID    folic acid  1 mg Oral Daily    pantoprazole  40 mg Oral Daily    lidocaine  1 patch TransDERmal Daily    insulin glargine  10 Units SubCUTAneous Nightly    insulin lispro  0-8 Units SubCUTAneous TID WC    insulin lispro  0-4 Units SubCUTAneous Nightly    sodium chloride flush  5-40 mL IntraVENous 2 times per day    magnesium oxide  400 mg Oral BID    mupirocin   Nasal BID    ipratropium-albuterol  1 ampule Inhalation Q4H WA    clopidogrel  75 mg Oral Daily    ezetimibe  10 mg Oral Nightly    atorvastatin  80 mg Oral Nightly     Continuous Infusions:   dextrose       PRN Meds:.traMADol, amiodarone, amiodarone bolus, magnesium sulfate, sodium chloride, trimethobenzamide, potassium chloride, bisacodyl, acetaminophen, sodium chloride flush, glucose, dextrose bolus **OR** dextrose bolus, glucagon (rDNA), dextrose    REVIEW OF SYSTEMS:     No pruritus, rash, bruising. Cardiac and pulmonary symptoms per HPI. No nausea, vomiting, abdominal pain, GI bleeding, change in bowel habits.   No dysuria, urinary frequency, urgency, hematuria, flank pain. Joint pain but no muscle soreness, stiffness, aching. No headache, speech disturbance, lateralizing neurologic deficit. No hemoptysis, epistaxis, easy bruising. Persistent anxiety, but no depression. No symptoms of hypothyroidism, hyperthyroidism, diabetes, heat or cold intolerance. FAMILY HISTORY: Negative for CAD in first-degree relatives. SOCIAL HISTORY: Negative for alcohol, tobacco, or illicit drug use. PHYSICAL EXAM:    General Appearance: in no acute distress  Neck:  no bruits  Lungs:  Normal expansion. Clear to auscultation. No rales, rhonchi, or wheezing. Heart:  Heart sounds are normal.  Regular rate and rhythm without murmur, gallop or rub. Abdomen:  Soft, non-tender. Extremities: Extremities warm to touch, pink, with no edema. Neuro/musculosketal:  Unremarkable. LABS:    Recent Labs     12/12/22  1100 12/13/22  0315 12/14/22  0415    140 135   CREATININE 0.5 0.5 0.4*       Recent Labs     12/12/22  1100 12/13/22  0315 12/14/22  0415   HGB 9.4* 9.1* 9.2*       Recent Labs     12/12/22  1100 12/13/22  0315   INR 1.3 1.3           IMPRESSION:    1.  CAD post non-STEMI with surgical disease-looking very good after three-vessel CABG and left atrial appendage ligation. Off of pressors. Continue indefinite aspirin. Does she require dual antiplatelet therapy with clopidogrel and aspirin. To my knowledge, not currently indicated from cardiac standpoint. 2.  Ischemic cardiomyopathy-echocardiogram as above. Restart metoprolol succinate 25 mg daily tomorrow for cardioprotection. Has a cough from lisinopril and plan to use valsartan post surgery for cardioprotection if blood pressure can tolerate. 3.  Severe hyperlipidemia-triglycerides 1200 and LDL unable to be calculated. High-dose atorvastatin along with Zetia to achieve LDL below 70.    4.  Diabetes-per hospitalist team.    5.  Continue to follow.

## 2022-12-14 NOTE — PLAN OF CARE
Problem: Cardiovascular - Adult  Goal: Maintains optimal cardiac output and hemodynamic stability  Outcome: Progressing     Problem: Cardiovascular - Adult  Goal: Absence of cardiac dysrhythmias or at baseline  Outcome: Progressing     Problem: Skin/Tissue Integrity - Adult  Goal: Skin integrity remains intact  Outcome: Progressing     Problem: Skin/Tissue Integrity - Adult  Goal: Incisions, wounds, or drain sites healing without S/S of infection  Outcome: Progressing     Problem: Infection - Adult  Goal: Absence of infection during hospitalization  Outcome: Progressing

## 2022-12-14 NOTE — PLAN OF CARE
Problem: Chronic Conditions and Co-morbidities  Goal: Patient's chronic conditions and co-morbidity symptoms are monitored and maintained or improved  Outcome: Progressing     Problem: Discharge Planning  Goal: Discharge to home or other facility with appropriate resources  Outcome: Progressing     Problem: Safety - Adult  Goal: Free from fall injury  Outcome: Progressing     Problem: ABCDS Injury Assessment  Goal: Absence of physical injury  Outcome: Progressing  Flowsheets (Taken 12/7/2022 1715 by Santiago Lu RN)  Absence of Physical Injury: Implement safety measures based on patient assessment     Problem: Pain  Goal: Verbalizes/displays adequate comfort level or baseline comfort level  Outcome: Progressing     Problem: Respiratory - Adult  Goal: Achieves optimal ventilation and oxygenation  Outcome: Progressing     Problem: Cardiovascular - Adult  Goal: Maintains optimal cardiac output and hemodynamic stability  12/14/2022 0740 by David Vasquez RN  Flowsheets (Taken 12/12/2022 2000 by Delvin Mccormick RN)  Maintains optimal cardiac output and hemodynamic stability:   Monitor blood pressure and heart rate   Monitor urine output and notify Licensed Independent Practitioner for values outside of normal range   Assess for signs of decreased cardiac output   Administer fluid and/or volume expanders as ordered   Administer vasoactive medications as ordered  12/13/2022 2331 by James Trujillo RN  Outcome: Progressing  Goal: Absence of cardiac dysrhythmias or at baseline  12/14/2022 0740 by David Vasquez RN  Outcome: Progressing  12/13/2022 2331 by James Trujillo RN  Outcome: Progressing     Problem: Skin/Tissue Integrity - Adult  Goal: Skin integrity remains intact  12/14/2022 0740 by David Vasquez RN  Outcome: Progressing  Flowsheets (Taken 12/12/2022 2000 by Delvin Mccormick RN)  Skin Integrity Remains Intact:   Monitor for areas of redness and/or skin breakdown   Assess vascular access sites hourly  12/13/2022 2331 by Mary Ann Wagner RN  Outcome: Progressing  Goal: Incisions, wounds, or drain sites healing without S/S of infection  12/14/2022 0740 by Sandhya Salinas RN  Outcome: Progressing  12/13/2022 2331 by Mary Ann Wagner RN  Outcome: Progressing     Problem: Gastrointestinal - Adult  Goal: Minimal or absence of nausea and vomiting  Outcome: Progressing  Flowsheets (Taken 12/14/2022 0740)  Minimal or absence of nausea and vomiting: Administer IV fluids as ordered to ensure adequate hydration  Goal: Maintains or returns to baseline bowel function  Outcome: Progressing     Problem: Genitourinary - Adult  Goal: Absence of urinary retention  Outcome: Progressing  Goal: Urinary catheter remains patent  Outcome: Progressing     Problem: Infection - Adult  Goal: Absence of infection at discharge  Outcome: Progressing  Goal: Absence of infection during hospitalization  12/14/2022 0740 by Sandhya Salinas RN  Outcome: Progressing  12/13/2022 2331 by Mary Ann Wagner RN  Outcome: Progressing     Problem: Hematologic - Adult  Goal: Maintains hematologic stability  Outcome: Progressing     Problem: Skin/Tissue Integrity  Goal: Absence of new skin breakdown  Description: 1. Monitor for areas of redness and/or skin breakdown  2. Assess vascular access sites hourly  3. Every 4-6 hours minimum:  Change oxygen saturation probe site  4. Every 4-6 hours:  If on nasal continuous positive airway pressure, respiratory therapy assess nares and determine need for appliance change or resting period.   Outcome: Progressing

## 2022-12-14 NOTE — PROGRESS NOTES
ENDOCRINOLOGY PROGRESS NOTE      Date of admission: 12/7/2022  Date of service: 12/14/2022  Admitting physician: Elier Loera MD   Primary Care Physician: Queenie Higginbotham MD  Consultant physician: Lena Solomon MD     Reason for the consultation:  Uncontrolled DM    History of Present Illness: The history is provided by the patient. Accuracy of the patient data is excellent. Leslie Pablo is a very pleasant 57 y.o. old female with PMH of poorly controlled DM type 2, CAD s/p stent, HTN, HLD and other listed below admitted to [address] [place] [address] on 12/7/2022 because of CP and found to have MVCAD, endocrine service was consulted for diabetes management. Subjective   Pt was seen and examined this AM, no acute events, -260 today. POD#2 s/p CABG x 3. Not eating much, roughly 25%.     Inpatient diet:   Carb Restricted diet     Point of care glucose monitoring   (Independently reviewed)   Recent Labs     12/13/22  0131 12/13/22  0319 12/13/22  0708 12/13/22  1126 12/13/22  1636 12/13/22  2104 12/14/22  0747 12/14/22  1119   GLUMET 90 139* 181* 165* 185* 242* 242* 262*     Scheduled Meds:   aspirin  81 mg Oral Daily    bisacodyl  5 mg Oral Daily    sennosides-docusate sodium  1 tablet Oral BID    magnesium hydroxide  30 mL Oral Daily    ferrous sulfate  325 mg Oral BID WC    vitamin C  500 mg Oral BID    folic acid  1 mg Oral Daily    pantoprazole  40 mg Oral Daily    [START ON 12/15/2022] metoprolol succinate  25 mg Oral Daily    insulin glargine  20 Units SubCUTAneous Nightly    lidocaine  1 patch TransDERmal Daily    insulin lispro  0-8 Units SubCUTAneous TID     insulin lispro  0-4 Units SubCUTAneous Nightly    sodium chloride flush  5-40 mL IntraVENous 2 times per day    magnesium oxide  400 mg Oral BID    mupirocin   Nasal BID    ipratropium-albuterol  1 ampule Inhalation Q4H WA    clopidogrel  75 mg Oral Daily    ezetimibe  10 mg Oral Nightly    atorvastatin  80 mg Oral Nightly       PRN Meds:   traMADol, 50 mg, Q6H PRN  amiodarone, 400 mg, PRN  amiodarone bolus, 150 mg, PRN  magnesium sulfate, 2,000 mg, PRN  sodium chloride, 1 spray, PRN  trimethobenzamide, 200 mg, Q6H PRN  potassium chloride, 20 mEq, PRN  bisacodyl, 10 mg, Daily PRN  acetaminophen, 1,000 mg, Q8H PRN  sodium chloride flush, 5-40 mL, PRN  glucose, 4 tablet, PRN  dextrose bolus, 125 mL, PRN   Or  dextrose bolus, 250 mL, PRN  glucagon (rDNA), 1 mg, PRN  dextrose, , Continuous PRN    Continuous Infusions:   dextrose         Review of Systems  All systems reviewed. All negative except for symptoms mentioned in HPI     OBJECTIVE    /64   Pulse 81   Temp 97 °F (36.1 °C)   Resp 20   Ht 5' 6\" (1.676 m)   Wt 163 lb 1.8 oz (74 kg)   SpO2 94% Comment: during ambulation  BMI 26.33 kg/m²     Intake/Output Summary (Last 24 hours) at 12/14/2022 1431  Last data filed at 12/14/2022 1252  Gross per 24 hour   Intake 1600 ml   Output 1595 ml   Net 5 ml         Physical examination:  General: awake alert, oriented x3  HEENT: normocephalic non traumatic, no exophthalmos   Neck: supple, No thyroid tenderness,  Pulm: good equal air entry no added sounds  CVS: S1 + S2  Abd: soft lax, no tenderness  Skin: warm, no lesions, no rash.  No open wounds, no ulcers   Neuro: CN intact, sensation decreased bilateral , muscle power normal  Psych: normal mood, and affect    Review of Laboratory Data:  I personally reviewed the following labs:   Recent Labs     12/12/22  1100 12/13/22  0315 12/14/22  0415   WBC 9.7 11.4 10.3   RBC 3.58 3.47* 3.47*   HGB 9.4* 9.1* 9.2*   HCT 29.2* 28.4* 27.8*   MCV 81.6 81.8 80.1   MCH 26.3 26.2 26.5   MCHC 32.2 32.0 33.1   RDW 13.7 14.3 14.6    213 168   MPV 10.3 10.7 10.8       Recent Labs     12/12/22  1100 12/12/22  1506 12/13/22  0315 12/13/22  1445 12/14/22  6055     --  140  --  135   K 3.7   < > 4.1 3.8 4.1     --  106  --  104   CO2 20*  --  20*  --  19*   BUN 9  --  9  --  7   CREATININE 0.5  --  0.5  --  0.4*   GLUCOSE 209*  --  156*  --  210*   CALCIUM 9.4  --  8.8  --  8.7    < > = values in this interval not displayed. No results found for: BHYDRXBUT  Lab Results   Component Value Date/Time    LABA1C 12.1 12/08/2022 08:03 AM    LABA1C 12.0 12/07/2022 02:10 AM    LABA1C 12.3 12/06/2022 07:34 AM     No results found for: TSH, T4FREE, W7DWVFD, FT3, Z7QJLJC, TSI, TPOABS, THGAB  Lab Results   Component Value Date/Time    LABA1C 12.1 12/08/2022 08:03 AM    GLUCOSE 210 12/14/2022 04:15 AM    GLUCOSE 186 11/08/2010 08:28 AM     Lab Results   Component Value Date/Time    TRIG 2,539 12/06/2022 07:34 AM    HDL 21 12/06/2022 07:34 AM    LDLCALC - 12/06/2022 07:34 AM    CHOL 449 12/06/2022 07:34 AM       Blood culture   No results found for: [facility]    Radiology:  XR CHEST PORTABLE   Final Result   Further development of a bi basilar multi segmental atelectasis in the lower   lung bases. XR CHEST PORTABLE   Final Result   Endotracheal nasogastric extubation with developing atelectasis at the bases,   left greater than right. XR CHEST PORTABLE   Final Result   Status post sternotomy. CT CHEST WO CONTRAST   Final Result   Normal ascending thoracic aorta without calcification or dissection. There   is diffuse coronary artery calcification. Multiple 2-7 mm nonspecific pulmonary nodules. Consider surveillance   according to [address]   Final Result   Atherosclerotic disease. No hemodynamically significant stenosis is   identified   Estimated stenosis by NASCET criteria in the proximal right carotid   artery is between 0% and 49%. Estimated stenosis by NASCET criteria in the proximal left carotid   artery is between 0% and 49%. US DUP LOWER EXTREMITY MAPPING BILAT VENOUS   Final Result   1. Bilateral venous mapping as described. 2. No superficial venous thrombosis visualized.          VL CATERINA BILATERAL LIMITED 1-2 LEVELS   Final Result      XR CHEST PORTABLE    (Results Pending)       Medical Records/Labs/Images review:   I personally reviewed and summarized previous records   All labs and imaging were reviewed independently     950 DM Rivera, a 57 y.o.-old female seen today for inpatient diabetes management     Diabetes Mellitus type 2  Patient's diabetes is uncontrolled , A1c 12%  Pt was refusing insulin but after long discussion she agreed to start insulin at this time   will change diabetes regimen to: Increase Lantus 18u nightly  Hold Humalog 7u with meals while not eating much. Medium dose sliding scale. Continue glucose check with meals and at bedtime   Will titrate insulin dose based on the blood glucose trend & insulin requirement  Will arrange for patient to be seen in endocrinology clinic upon discharge for routine diabetes maintenance and prevention. NSTEMI  S/p CABG surgery   Discussed the importance of controlling DM in management of CAD     Dietary noncompliance  Discussed with patient the importance of eating consistent carbohydrate meals, avoiding high glycemic index food. Also, discussed with patient the risk and negative consequences of dietary noncompliance on blood glucose control, blood pressure and weight    Interdisciplinary plan for communication with healthcare providers:   Consult recommendations were discussed with the Primary Service/Nursing staff      The above issues were reviewed with the patient who understood and agreed with the plan. Thank you for allowing us to participate in the care of this patient. Please do not hesitate to contact us with any additional questions.      I saw the patient and discussed the management with the resident physician Dr. Eliza Martinez MD.  I reviewed and agree with the findings and plan as documented in the resident's note    Aida Beatty MD  Endocrinologist, [address] [facility]   [facility] [place] [facility] [place] [facility] [address]   Phone: [phone]  Fax: [phone]  --------------------------------------------  An electronic signature was used to authenticate this note.  Aide Ortiz MD on 12/14/2022 at 2:31 PM

## 2022-12-14 NOTE — PROGRESS NOTES
OCCUPATIONAL THERAPY TREATMENT NOTE    [address] [address] [facility]  [address] [place], [place]       [place]:                                                               Patient Name: Vicente Almodovar  MRN: [id number]  : 1960  Room: 3816       Evaluating OT: Conor Duenas, OTR/L 1406     Referring Provider: Mary Ann Garcia PA-C   Specific Provider Orders/Date: OT eval and treat (22)        Diagnosis: CAD in native artery [I25.10]        Surgery/Procedures:   CABG x3      Pertinent Medical History: : Inferior infarction s/p RCA MIKEL in 2017, Uncontrolled DMII, HTN, chronic RBBB, hyperlipidemia, hypertriglyceridemia      *Precautions:  Fall Risk, sternal, O2, chest tubes x 2     Assessment of current deficits   [x]Functional mobility            [x]ADLs           [x]Strength                  []Cognition  [x]Functional transfers          [x]IADLs          [x]Safety Awareness   [x]Endurance  []Fine Motor Coordination   [x]Balance       []Vision/perception    []Sensation      []Gross Motor Coordination [x]ROM           []Delirium                  [] Motor Control     []Communication      OT PLAN OF CARE   OT POC based on physician orders, patient diagnosis and results of clinical assessment.         Frequency/Duration: 1-3 days/wk for 1-2 weeks PRN     Specific OT Treatment to include:   ADL retraining/adapted techniques and AE recommendations to increase functional independence within precautions                    Energy conservation techniques to improve tolerance for selfcare routine   Functional transfer/mobility training/DME recommendations for increased independence, safety and fall prevention         Patient/family education to increase safety and functional independence within precautions             Environmental modifications for safe mobility and completion of ADLs                             Therapeutic activity to improve functional performance during ADLs                                         Therapeutic exercise to improve tolerance and functional strength for ADLs   Balance retraining exercises/tasks for facilitation of postural control with dynamic challenges during ADLs . Recommended Adaptive Equipment:  LB dressing AE pending progress, shower seat, hand held shower     Home Living: Pt lives with  (home to assist)  in a 2 floor plan with 2+1 step(s) to enter and no rail(s); bed/bath on first floor  Bathroom setup: tub/shower; standard commode seat  Equipment owned: no DME     Prior Level of Function: IND with ADLs;  IND with IADLs. No device for ambulation. Driving: yes  Occupation: works part time ([employer])      Pain Level: pt c/o 4/10 back/ chest pain  this session     Cognition: A&O: 4/4    Follows 1-2 step commands appropriately.               Memory: Good              Comprehension Good              Problem solving: Fair+/Goo              Judgement/safety: Fair+/Good                 Communication skills: WFL              Vision: WFL                     Glasses:yes                                                        Hearing: WFL                RASS: 0  CAM-ICU: (NT) Delirium      UE Assessment:  Hand Dominance: Right [x]  Left []       ROM Strength STM goal: PRN   RUE  Grossly WFL within precautions Not formally tested; grossly WFL              WNL for ADLS      LUE Grossly WFL within precautions Not formally tested; grossly WFL              WNL for ADLS         Sensation: No c/o numbness or tingling in extremities   Tone: WNL   Edema: WFL     Functional Assessment: AM-PAC Daily Activity Raw Score: 15/24    Initial Eval Status  Date: 12/13/22 Treatment Status  Date:12/14 STG=LTG  Time Frame: 5-7days   Feeding S; set up Yumiko                       IND  while seated up in chair to increase activity tolerance         Grooming Min A; set up S; set up   seated                       Yumiko   while standing sink level demonstrating G tolerance; G balance. UB dressing/bathing Max A  Mod A                      Min A   demonstrating G knowledge of precautions during tasks      LB dressing/bathing Max A        Min A  Seated demo G follow through of sternal precautions; short breaks                      Min A  using AE as needed for safe reach/ energy conservation        Toileting NT  NT                      Min A      Bed Mobility  Supine to sit:   Mod A+2     Sit to supine:   NT  Min A                      Min A  in prep of ADL tasks & transfers   Functional Transfers Sit to stand: Min A  from higher bed surface; Mod A from lower chair surface     Stand to sit: Mod A  Min A                      Yumiko  sit<>stand/functional bathroom transfers using AD/DME as needed for balance and safety   Functional Mobility Min A  no device  Min A                         Yumiko   functional/bathroom mobility using AD as needed & demonstrating G safety      Balance Sitting:     Static:  SBA    Dynamic:Min A  Standing: Min A Sitting:     Static:  S    Dynamic:SBA  Standing: Min A  Yumiko dynamic sitting balance; Yumiko dynamic standing balance  during ADL tasks & transfers   Endurance/  Activity Tolerance    F tolerance with light activity. Fair+/good tolerance with light to moderate activity; min SOB  G   tolerance with moderate activity/self care routine   Visual/  Perceptual               WFL                                    Vitals:  Heart Rate at rest 77 bpm Heart Rate post session 61 bpm   SpO2 at rest 97% SpO2 post session -%   Blood Pressure at rest 108/60 mmHg Blood Pressure post session 114/75 mmHg     Treatment: OT treatment provided this date:  Bed mobility: Instruction on precautions prior to bed mobility to facilitate safe transfers and ADLS. HOB elevated to assist. Pt demo G follow through; no c/o dizziness EOB.     Balance retraining: Performed sitting/standing balance ex's with instruction to facilitate righting reactions with postural changes during ADLS. Pt demo improved balance and strength to perform ADLs seated in chair. ADL retraining: Instruction on adapted techniques/AE(crossing LE's) to increase independence and safe reach during dressing/bathing activities. Pt demonstrated G follow through. Energy conservation reviewed. Energy conservation: Education on breathing techniques, pacing, work simplification strategies & recommended bathroom DME for safety and energy conservation during self care tasks and activities of daily living. Line management and environmental modifications made prior to and end of session to ensure patient safety and to increase efficiency of session. Skilled monitoring of HR, O2 saturation, blood pressure and patient's response to activity performed throughout session. Comments: OK from RN to see patient. Upon arrival, patient supine in bed, agreeable to session. Pt demo fair+/good tolerance with G understanding of education/techniques. At end of session, patient left seated in chair to increase activity tolerance;  present . Call light within reach, all lines and tubes intact. Pt instructed on use of call light for assistance and fall prevention. Pt can benefit from continued skilled OT during hospital stay to increase safety, functional independence & quality of life. Pt has made G progress towards set goals.    Continue with current plan of care       Time In:0905              Time Out: 0920         Total Treatment Time: 15      Treatment Charges: Mins Units   Ther Ex  [id number]     Manual Therapy [id number]     Thera Activities [id number]     ADL/Home Mgt 17730 15 1   Neuro Re-ed [id number]     Orthotic manage/training  [id number]     Non-Billable Time         Delorise  OTR/L 2160

## 2022-12-14 NOTE — PROGRESS NOTES
Pt ambulated 400ft on RA without difficulty. Pt will need full flight of stairs before discharged.      SpO2 readings:  97% before on RA  96% during on RA  98% after on RA

## 2022-12-14 NOTE — PROGRESS NOTES
Physical Therapy  Physical Therapy Treatment Note    Name: Armond Aparicio  : 1960  MRN: [id number]      Date of Service: 2022    Evaluating PT:  Marie Nair, PT, DPT [id number]    Room #:  3055/1423-Y  Diagnosis:  CAD in native artery [I25.10]  PMHx/PSHx:    Past Medical History:   Diagnosis Date    2019 novel coronavirus disease (COVID-19) 11/10/2021    Coronary artery disease involving native coronary artery of native heart without angina pectoris     Diabetes mellitus ([facility] [address]     Hyperlipidemia     Hypertension      Procedure/Surgery:   CABG x 3  Precautions:  Falls, Sternal, Equipment Needs:  TBD    SUBJECTIVE:    Pt lives with  in a 2 story home with 2 stairs to enter and no rail. Full flight of steps and no rail to bedroom. Pt can sleep on 1st floor. Pt ambulated without device and was independent PTA. OBJECTIVE:   Initial Evaluation  Date: 22 Treatment  22 Short Term/ Long Term   Goals   AM-PAC 6 Clicks     Was pt agreeable to Eval/treatment? yes yes    Does pt have pain?  7/10 back pain 4/10 back and surgical pain    Bed Mobility  Rolling: NT  Supine to sit: ModA x 2 with HOB elevated  Sit to supine: NT  Scooting: MaxA Rolling: NT  Supine to sit: Tr with HOB elevated  Sit to supine: NT  Scooting: Tr Tr   Transfers Sit to stand: Tr bed; [employer] chair  Stand to sit: ModA  Stand pivot: Tr no device Sit to stand: Tr   Stand to sit: Tr  Stand pivot: Tr no device Independent   Ambulation   25 feet with Tr no device 225 feet with Tr  no device >400 feet Independently   Stair negotiation: ascended and descended NT NT >4 steps with 1 rail Mod Independent   ROM BUE:  Defer to OT note  BLE:  WFL     Strength BUE:  Defer to OT note  BLE:  4+/5  Increase by 1/3 MMT grade   Balance Sitting EOB:  Tr  Dynamic Standing:  Tr no device Sitting EOB:  Tr  Dynamic Standing:  Tr no device Sitting EOB:  Independent  Dynamic Standing:  Independent     Pt is A & O x 4  CAM-ICU: NT  RASS: 0  Sensation:  No reported paresthesias  Edema:  None    Vitals:  Heart Rate at rest 77 bpm Heart Rate post session 61 bpm   SpO2 at rest 97% SpO2 post session -%   Blood Pressure at rest 108/60 mmHg Blood Pressure post session 114/75 mmHg   BP in chair: 126/63    Functional Status Score-Intensive Care Unit (FSS-ICU)   Rolling -/7   Supine to sit transfer 3/7   Unsupported sitting  4/7   Sit to stand transfers 4/7   Ambulation 4/7   Total  15/35     Therapeutic Exercises:  NA    Patient education  Pt educated on safety    Patient response to education:   Pt verbalized understanding Pt demonstrated skill Pt requires further education in this area   x x x     ASSESSMENT:    Conditions Requiring Skilled Therapeutic Intervention:    [x]Decreased strength     []Decreased ROM  [x]Decreased functional mobility  [x]Decreased balance   [x]Decreased endurance   [x]Decreased posture  []Decreased sensation  []Decreased coordination   []Decreased vision  []Decreased safety awareness   [x]Increased pain       Comments:  NP reported pt was medically stable. Pt was in bed upon arrival, agreeable to treatment session. Reviewed sternal precautions prior to activity. Pt demonstrated improved mobility this session. Pt ambulated with slight unsteadiness with one minor LOB. Improved gait mechanics and fluidity of movement noted. Pt was left in chair with all needs met and call light in reach. Treatment:  Patient practiced and was instructed in the following treatment:    Bed mobility training - pt given verbal and tactile cues to facilitate proper sequencing and safety during supine>sit as well as provided with physical assistance.    Sitting EOB for >5 minutes for upright tolerance, postural awareness and BLE ROM  Transfer training - pt was given verbal and tactile cues to facilitate proper hand placement, technique and safety during sit to stand and stand to sit as well as provided with physical assistance. Gait training- pt was given verbal and tactile cues to facilitate safety and balance during ambulation as well as provided with physical assistance. PLAN:    Patient is making good progress towards established goals. Will continue with current POC.       Time in  0850  Time out  0913    Total Treatment Time  23 minutes     CPT codes:  [] Gait training 55935 - minutes  [] Manual therapy 14915 - minutes  [x] Therapeutic activities 03432 23 minutes  [] Therapeutic exercises 96814 - minutes  [] Neuromuscular reeducation 31852 - minutes    Sami Morris PT, DPT  [id number]

## 2022-12-14 NOTE — PROGRESS NOTES
CVICU Progress Note    Name: Jun Cleaning  MRN: [id number]    CC: Postoperative Critical Care Management     Indication for Surgery/Procedure: NSTEMI, CAD  AILIN LVEF:  60%    RVF:  NML     Important/Relevant PMH/PSH: Inferior infarction s/p RCA MIKEL in 2017, Uncontrolled DMII, HTN, chronic RBBB, hyperlipidemia, hypertriglyceridemia     Procedure/Surgeries: 12/12/2022  CABG x3 (LIMA-LAD, SVG-OM, SVG-PDA)  LAAL (35 mm AtriCure)  EVH  Sternal plating      Pacing wires:  Ventricular         Intake/Output Summary (Last 24 hours) at 12/14/2022 0758  Last data filed at 12/14/2022 0700  Gross per 24 hour   Intake 1840 ml   Output 1735 ml   Net 105 ml       Recent Labs     12/12/22  1100 12/13/22  0315 12/14/22  0415   WBC 9.7 11.4 10.3   HGB 9.4* 9.1* 9.2*   HCT 29.2* 28.4* 27.8*    213 168      Lab Results   Component Value Date/Time     12/14/2022 04:15 AM    K 4.1 12/14/2022 04:15 AM    K 4.2 12/12/2022 05:46 AM     12/14/2022 04:15 AM    CO2 19 12/14/2022 04:15 AM    BUN 7 12/14/2022 04:15 AM    CREATININE 0.4 12/14/2022 04:15 AM    GLUCOSE 210 12/14/2022 04:15 AM    GLUCOSE 186 11/08/2010 08:28 AM    CALCIUM 8.7 12/14/2022 04:15 AM         Physical Exam:    BP (!) 100/50   Pulse 74   Temp 98.4 °F (36.9 °C) (Oral)   Resp 23   Ht 5' 6\" (1.676 m)   Wt 163 lb 1.8 oz (74 kg)   SpO2 98%   BMI 26.33 kg/m²       CXR Findings: 12/14/2022      --  CXR personally viewed and interpreted by ICU Nurse Practitioner, final readings per radiology     General: Awake, alert. Sitting in chair  Eyes: PERRL, anicteric   Pulmonary: Diminished bibasilar.  No wheezes, no accessory muscle use noted on 2L NC  Cardiovascular:  RRR, no heaves or thrills on palpation  Tele: SR  Abdomen: Soft, nontender, + BS   Extremities: Palpable pulses all extremities, no edema   Neurologic/Psych: A&Ox3, HADDAD to command   Skin: Warm and dry  Incisions: MSI with louie dressing intact, RLE SVG sites well approximated       Assessment/Plan: POD #2  1. NSTEMI/CAD S/p CABG x3 (LIMA-LAD, SVG-OM, SVG-PDA), LAAL (35 mm AtriCure)  - ASA, Plavix, Lipitor, Zetia  - Start BB once BP permits, SBP ~114  - All chest tubes removed w/o difficulty, pt tolerated well  - Sternal precautions   - PT/OT     2. Acute Pulmonary Insufficiency Following Surgery    - Expected 2/2 surgery  - Adequate o/v on 2L NC, encourage IS, ezpap per RT. Ambulate. Wean off oxygen as able      3. Postoperative hypotension   - resolved     4. Acute Post Operative Pain   - scheduled tylenol; lidoderm patch     5.  Uncontrolled DMII  -Hemoglobin A1c 12.1  -SSI/long acting, dosage changes per endocrine service        VTE Prophylaxis: Pharmacologic/Mechanical:  Yes, SCDs   Line infection prevention: Can CVC or arterial line be removed: yes  Continued need for urinary catheter: no, remove    Dispo: awaiting PCCU bed     Electronically signed by ILA Lewis CNP on 12/14/2022 at 7:58 AM

## 2022-12-15 ENCOUNTER — APPOINTMENT (OUTPATIENT)
Dept: GENERAL RADIOLOGY | Age: 62
DRG: 233 | End: 2022-12-15
Attending: INTERNAL MEDICINE

## 2022-12-15 ENCOUNTER — TELEPHONE (OUTPATIENT)
Dept: FAMILY MEDICINE CLINIC | Age: 62
End: 2022-12-15

## 2022-12-15 VITALS
OXYGEN SATURATION: 94 % | HEART RATE: 72 BPM | WEIGHT: 161.4 LBS | BODY MASS INDEX: 25.94 KG/M2 | DIASTOLIC BLOOD PRESSURE: 59 MMHG | TEMPERATURE: 97.3 F | RESPIRATION RATE: 18 BRPM | HEIGHT: 66 IN | SYSTOLIC BLOOD PRESSURE: 94 MMHG

## 2022-12-15 LAB
ANION GAP SERPL CALCULATED.3IONS-SCNC: 14 MMOL/L (ref 7–16)
BLOOD BANK DISPENSE STATUS: NORMAL
BLOOD BANK PRODUCT CODE: NORMAL
BPU ID: NORMAL
BUN BLDV-MCNC: 8 MG/DL (ref 6–23)
CALCIUM SERPL-MCNC: 8.9 MG/DL (ref 8.6–10.2)
CHLORIDE BLD-SCNC: 104 MMOL/L (ref 98–107)
CO2: 19 MMOL/L (ref 22–29)
CREAT SERPL-MCNC: 0.4 MG/DL (ref 0.5–1)
DESCRIPTION BLOOD BANK: NORMAL
GFR SERPL CREATININE-BSD FRML MDRD: >60 ML/MIN/1.73
GLUCOSE BLD-MCNC: 185 MG/DL (ref 74–99)
HCT VFR BLD CALC: 31.7 % (ref 34–48)
HEMOGLOBIN: 9.4 G/DL (ref 11.5–15.5)
MAGNESIUM: 2.1 MG/DL (ref 1.6–2.6)
MCH RBC QN AUTO: 26.5 PG (ref 26–35)
MCHC RBC AUTO-ENTMCNC: 29.7 % (ref 32–34.5)
MCV RBC AUTO: 89.3 FL (ref 80–99.9)
METER GLUCOSE: 199 MG/DL (ref 74–99)
METER GLUCOSE: 204 MG/DL (ref 74–99)
PDW BLD-RTO: 14.5 FL (ref 11.5–15)
PLATELET # BLD: 185 E9/L (ref 130–450)
PMV BLD AUTO: 11 FL (ref 7–12)
POTASSIUM SERPL-SCNC: 3.8 MMOL/L (ref 3.5–5)
RBC # BLD: 3.55 E12/L (ref 3.5–5.5)
SODIUM BLD-SCNC: 137 MMOL/L (ref 132–146)
WBC # BLD: 8.3 E9/L (ref 4.5–11.5)

## 2022-12-15 PROCEDURE — 85027 COMPLETE CBC AUTOMATED: CPT

## 2022-12-15 PROCEDURE — 2580000003 HC RX 258: Performed by: NURSE PRACTITIONER

## 2022-12-15 PROCEDURE — 94640 AIRWAY INHALATION TREATMENT: CPT

## 2022-12-15 PROCEDURE — 6370000000 HC RX 637 (ALT 250 FOR IP): Performed by: INTERNAL MEDICINE

## 2022-12-15 PROCEDURE — 6370000000 HC RX 637 (ALT 250 FOR IP): Performed by: NURSE PRACTITIONER

## 2022-12-15 PROCEDURE — 82962 GLUCOSE BLOOD TEST: CPT

## 2022-12-15 PROCEDURE — 83735 ASSAY OF MAGNESIUM: CPT

## 2022-12-15 PROCEDURE — 36415 COLL VENOUS BLD VENIPUNCTURE: CPT

## 2022-12-15 PROCEDURE — 93798 PHYS/QHP OP CAR RHAB W/ECG: CPT

## 2022-12-15 PROCEDURE — 71045 X-RAY EXAM CHEST 1 VIEW: CPT

## 2022-12-15 PROCEDURE — 80048 BASIC METABOLIC PNL TOTAL CA: CPT

## 2022-12-15 RX ORDER — ATORVASTATIN CALCIUM 80 MG/1
80 TABLET, FILM COATED ORAL NIGHTLY
Qty: 30 TABLET | Refills: 3 | Status: SHIPPED | OUTPATIENT
Start: 2022-12-15

## 2022-12-15 RX ORDER — METOPROLOL SUCCINATE 25 MG/1
25 TABLET, EXTENDED RELEASE ORAL DAILY
Qty: 30 TABLET | Refills: 3 | Status: SHIPPED | OUTPATIENT
Start: 2022-12-15

## 2022-12-15 RX ORDER — EZETIMIBE 10 MG/1
10 TABLET ORAL NIGHTLY
Qty: 30 TABLET | Refills: 3 | Status: SHIPPED | OUTPATIENT
Start: 2022-12-15

## 2022-12-15 RX ORDER — LANCETS
EACH MISCELLANEOUS
Qty: 250 EACH | Refills: 5 | Status: SHIPPED | OUTPATIENT
Start: 2022-12-15

## 2022-12-15 RX ORDER — DOCUSATE SODIUM 100 MG/1
100 CAPSULE, LIQUID FILLED ORAL 2 TIMES DAILY PRN
Qty: 60 CAPSULE | Refills: 0 | Status: SHIPPED | OUTPATIENT
Start: 2022-12-15 | End: 2023-01-14

## 2022-12-15 RX ORDER — CLOPIDOGREL BISULFATE 75 MG/1
75 TABLET ORAL DAILY
Qty: 30 TABLET | Refills: 1 | Status: SHIPPED | OUTPATIENT
Start: 2022-12-16

## 2022-12-15 RX ORDER — FOLIC ACID 1 MG/1
1 TABLET ORAL DAILY
Qty: 30 TABLET | Refills: 0 | Status: SHIPPED | OUTPATIENT
Start: 2022-12-15 | End: 2023-01-14

## 2022-12-15 RX ORDER — ASCORBIC ACID 500 MG
500 TABLET ORAL 2 TIMES DAILY
Qty: 60 TABLET | Refills: 0 | Status: SHIPPED | OUTPATIENT
Start: 2022-12-15 | End: 2023-01-14

## 2022-12-15 RX ORDER — INSULIN LISPRO 100 [IU]/ML
INJECTION, SOLUTION INTRAVENOUS; SUBCUTANEOUS
Qty: 5 ADJUSTABLE DOSE PRE-FILLED PEN SYRINGE | Refills: 3 | Status: SHIPPED | OUTPATIENT
Start: 2022-12-15

## 2022-12-15 RX ORDER — PANTOPRAZOLE SODIUM 40 MG/1
40 TABLET, DELAYED RELEASE ORAL DAILY
Qty: 14 TABLET | Refills: 0 | Status: SHIPPED | OUTPATIENT
Start: 2022-12-16 | End: 2022-12-30

## 2022-12-15 RX ORDER — GLUCOSAMINE HCL/CHONDROITIN SU 500-400 MG
CAPSULE ORAL
Qty: 250 STRIP | Refills: 5 | Status: SHIPPED | OUTPATIENT
Start: 2022-12-15

## 2022-12-15 RX ORDER — INSULIN LISPRO 100 [IU]/ML
5 INJECTION, SOLUTION INTRAVENOUS; SUBCUTANEOUS
Status: DISCONTINUED | OUTPATIENT
Start: 2022-12-15 | End: 2022-12-15 | Stop reason: HOSPADM

## 2022-12-15 RX ORDER — PEN NEEDLE, DIABETIC 32 GX 1/4"
NEEDLE, DISPOSABLE MISCELLANEOUS
Qty: 250 EACH | Refills: 5 | Status: SHIPPED | OUTPATIENT
Start: 2022-12-15

## 2022-12-15 RX ORDER — FERROUS SULFATE 325(65) MG
325 TABLET ORAL 2 TIMES DAILY WITH MEALS
Qty: 60 TABLET | Refills: 0 | Status: SHIPPED | OUTPATIENT
Start: 2022-12-15 | End: 2023-01-14

## 2022-12-15 RX ORDER — INSULIN GLARGINE 100 [IU]/ML
18 INJECTION, SOLUTION SUBCUTANEOUS NIGHTLY
Qty: 5 ADJUSTABLE DOSE PRE-FILLED PEN SYRINGE | Refills: 3 | Status: SHIPPED | OUTPATIENT
Start: 2022-12-15

## 2022-12-15 RX ADMIN — FERROUS SULFATE TAB 325 MG (65 MG ELEMENTAL FE) 325 MG: 325 (65 FE) TAB at 08:37

## 2022-12-15 RX ADMIN — IPRATROPIUM BROMIDE AND ALBUTEROL SULFATE 1 AMPULE: 2.5; .5 SOLUTION RESPIRATORY (INHALATION) at 12:25

## 2022-12-15 RX ADMIN — METOPROLOL SUCCINATE 25 MG: 25 TABLET, EXTENDED RELEASE ORAL at 08:37

## 2022-12-15 RX ADMIN — ACETAMINOPHEN 1000 MG: 500 TABLET ORAL at 11:58

## 2022-12-15 RX ADMIN — DOCUSATE SODIUM 50 MG AND SENNOSIDES 8.6 MG 1 TABLET: 8.6; 5 TABLET, FILM COATED ORAL at 08:37

## 2022-12-15 RX ADMIN — POTASSIUM CHLORIDE 20 MEQ: 1500 TABLET, EXTENDED RELEASE ORAL at 10:53

## 2022-12-15 RX ADMIN — MAGNESIUM OXIDE 400 MG (241.3 MG MAGNESIUM) TABLET 400 MG: TABLET at 08:36

## 2022-12-15 RX ADMIN — OXYCODONE HYDROCHLORIDE AND ACETAMINOPHEN 500 MG: 500 TABLET ORAL at 08:37

## 2022-12-15 RX ADMIN — INSULIN LISPRO 5 UNITS: 100 INJECTION, SOLUTION INTRAVENOUS; SUBCUTANEOUS at 08:39

## 2022-12-15 RX ADMIN — POTASSIUM CHLORIDE 20 MEQ: 1500 TABLET, EXTENDED RELEASE ORAL at 10:54

## 2022-12-15 RX ADMIN — INSULIN LISPRO 5 UNITS: 100 INJECTION, SOLUTION INTRAVENOUS; SUBCUTANEOUS at 12:00

## 2022-12-15 RX ADMIN — SODIUM CHLORIDE, PRESERVATIVE FREE 10 ML: 5 INJECTION INTRAVENOUS at 08:38

## 2022-12-15 RX ADMIN — PANTOPRAZOLE SODIUM 40 MG: 40 TABLET, DELAYED RELEASE ORAL at 08:37

## 2022-12-15 RX ADMIN — FOLIC ACID 1 MG: 1 TABLET ORAL at 08:37

## 2022-12-15 RX ADMIN — MUPIROCIN: 20 OINTMENT TOPICAL at 10:53

## 2022-12-15 RX ADMIN — MUPIROCIN: 20 OINTMENT TOPICAL at 00:40

## 2022-12-15 RX ADMIN — INSULIN LISPRO 2 UNITS: 100 INJECTION, SOLUTION INTRAVENOUS; SUBCUTANEOUS at 12:00

## 2022-12-15 RX ADMIN — ASPIRIN 81 MG: 81 TABLET, COATED ORAL at 08:37

## 2022-12-15 RX ADMIN — CLOPIDOGREL BISULFATE 75 MG: 75 TABLET ORAL at 08:36

## 2022-12-15 ASSESSMENT — PAIN SCALES - GENERAL
PAINLEVEL_OUTOF10: 3
PAINLEVEL_OUTOF10: 0
PAINLEVEL_OUTOF10: 0

## 2022-12-15 NOTE — TELEPHONE ENCOUNTER
Des Ralph is being discharged from [facility] in [place] today and would like to schedule a hospital follow up. The only opening I could find is on December 20th, but she already has an appt scheduled for that day with endo. Please advise. Thank you.

## 2022-12-15 NOTE — DISCHARGE SUMMARY
Physician Discharge Summary     Patient ID:  Emma Jc  [id number]  75 y.o.  1960    Admit date: 12/7/2022    Discharge date: 12/15/22    Admitting Physician: Sarah Ashley MD     Discharge Physician: Dennie Hof, DO    Discharge Diagnoses:   POSTOPERATIVE DIAGNOSIS:  Severe multivessel coronary artery disease. OPERATIONS PERFORMED:  1. Coronary artery bypass grafting x3 using left internal mammary  artery to left anterior descending artery, reverse saphenous vein graft  to the dominant obtuse marginal branch of circumflex, reverse saphenous  vein graft to the posterior descending artery. 2.  Left atrial appendage ligation using a 35-mm AtriCure appendage  clip. 3.  Lower extremity endoscopic vein harvest.  4.  Rigid sternal fixation with the [employer] plating system. Discharged Condition: stable    Indication for Admission:   Severe multivessel coronary artery disease    Hospital Course: Course as above, and on 12/12/22 the patient underwent CABG x3 (LIMA-LAD, SVG-OM, SVG-PDA), LAAL (35 mm AtriCure). Postoperatively, she was transferred to the CVICU in guarded stable condition and extubated once indications met. Once appropriate, she was transferred to the monitored stepdown unit and beta blockade was initiated. Endocrine was following for diabetes management. The mediastinal/pleural chest tubes, and epicardial pacing wires were discontinued in the usual fashion. Supplemental oxygen was weaned off, all ambulatory requirements were met, and she was deemed ready for discharge. She was discharged to home on POD 3 (12/15/22) in stable condition with a post-operative appointment scheduled.        Consults: cardiology and endocrinology    Discharge Exam:  Vitals          Vitals:     12/15/22 0307 12/15/22 0555 12/15/22 0804 12/15/22 0815   BP: 126/63   114/62     Pulse: 75   82     Resp: 16   18     Temp: 97.8 °F (36.6 °C)   97.1 °F (36.2 °C)     TempSrc: Temporal   Temporal     SpO2: 97%     98% Weight:   161 lb 6.4 oz (73.2 kg)       Height:                 O2: RA        Intake/Output Summary (Last 24 hours) at 12/15/2022 0901  Last data filed at 12/15/2022 7377      Gross per 24 hour   Intake 480 ml   Output 1925 ml   Net -1445 ml         +BM on 12/15     UO: 1125mL/8hr              Recent Labs     12/12/22  1100 12/13/22  0315 12/14/22  0415   WBC 9.7 11.4 10.3   HGB 9.4* 9.1* 9.2*   HCT 29.2* 28.4* 27.8*    213 168            Recent Labs     12/13/22  0315 12/14/22  0415 12/15/22  0601   BUN 9 7 8   CREATININE 0.5 0.4* 0.4*         Telemetry: SR        PE  Cardiac: RRR  Lungs: decreased bases  Chest incision C/D/I, approximated, no erythema. Sternum stable. Prior chest tube site incisions C/D/I, no erythema with intact sutures. Epicardial pacing wires present and secure.    Abd: Soft, nontender, +BS  Ext: Incisions C/D/I, approximated, no erythema, + edema      Disposition: home    Patient Instructions:      Medication List        START taking these medications      ascorbic acid 500 MG tablet  Commonly known as: VITAMIN C  Take 1 tablet by mouth 2 times daily     atorvastatin 80 MG tablet  Commonly known as: LIPITOR  Take 1 tablet by mouth nightly     clopidogrel 75 MG tablet  Commonly known as: PLAVIX  Take 1 tablet by mouth daily  Start taking on: December 16, 2022     docusate sodium 100 MG capsule  Commonly known as: COLACE  Take 1 capsule by mouth 2 times daily as needed for Constipation     ezetimibe 10 MG tablet  Commonly known as: ZETIA  Take 1 tablet by mouth nightly     ferrous sulfate 325 (65 Fe) MG tablet  Commonly known as: IRON 325  Take 1 tablet by mouth 2 times daily (with meals)     folic acid 1 MG tablet  Commonly known as: FOLVITE  Take 1 tablet by mouth daily     metoprolol succinate 25 MG extended release tablet  Commonly known as: TOPROL XL  Take 1 tablet by mouth daily     pantoprazole 40 MG tablet  Commonly known as: PROTONIX  Take 1 tablet by mouth daily for 14 days  Start taking on: December 16, 2022            CONTINUE taking these medications      aspirin 81 MG chewable tablet     fish oil 1000 MG capsule     magnesium oxide 400 MG tablet  Commonly known as: MAG-OX     POTASSIUM PO     Vitamin D3 125 MCG (5000 UT) Tabs            STOP taking these medications      calcium carbonate 600 MG Tabs tablet     lisinopril-hydroCHLOROthiazide 20-12.5 MG per tablet  Commonly known as: PRINZIDE;ZESTORETIC     metoprolol tartrate 25 MG tablet  Commonly known as: LOPRESSOR     nitroGLYCERIN 0.4 MG SL tablet  Commonly known as: NITROSTAT            ASK your doctor about these medications      * glipiZIDE 10 MG tablet  Commonly known as: GLUCOTROL     * glipiZIDE 10 MG tablet  Commonly known as: GLUCOTROL           * This list has 2 medication(s) that are the same as other medications prescribed for you. Read the directions carefully, and ask your doctor or other care provider to review them with you.                    Where to Get Your Medications        You can get these medications from any pharmacy    Bring a paper prescription for each of these medications  ascorbic acid 500 MG tablet  atorvastatin 80 MG tablet  clopidogrel 75 MG tablet  docusate sodium 100 MG capsule  ezetimibe 10 MG tablet  ferrous sulfate 325 (65 Fe) MG tablet  folic acid 1 MG tablet  metoprolol succinate 25 MG extended release tablet  pantoprazole 40 MG tablet       Activity: sternal precautions  Diet: cardiac diet  Wound Care: as directed    Follow-up with   Future Appointments   Date Time Provider Marcos Cedeno   12/20/2022  1:40 PM LIA Valencia - CNS BDM ENDO [facility]   1/10/2023  1:15 PM Shelia Diaz DO CARDIO SURG [facility]   1/27/2023  3:30 PM DO JIMENEZ [facility]       Smoking cessation education provided prior to discharge    Discussed with patient the benefits of participation in cardiac rehab after discharge once approved safe by the surgeon and that a referral will be made at the follow up appointment. Pt verbalized comprehension.     Signed:  Electronically signed by ILA Pedersen CNP on 12/15/2022 at 9:50 AM

## 2022-12-15 NOTE — CONSULTS
Met with patient and discussed that their physician has ordered a referral to our outpatient Phase II Cardiac Rehabilitation program. Reviewed the benefits of cardiac rehabilitation based on their diagnosis and personal risk factors. Patient demonstrates strong interest in Cardiac Rehabilitation at this time. Cardiac Rehabilitation brochure provided to patient/family. The Cardiac Rehabilitation Program has been provided the patient's referral information and pertinent patient details and history. The patient may call [facility] [employer] at [phone] for additional information or questions. Contact information for [facility] and other choices close to the patient's residence have been provided in the discharge instructions so that the patient may call and schedule an appointment when cleared by their physician. Thank you for the referral.     PT was provided with Home Heart information, and outpatient phase 2 cardiac rehab information.

## 2022-12-15 NOTE — PROGRESS NOTES
POD#3 Awake, alert. No complaints. Denies CP, palpitations, SOB at rest, dizziness/lightheadedness. Vitals:    12/15/22 0307 12/15/22 0555 12/15/22 0804 12/15/22 0815   BP: 126/63  114/62    Pulse: 75  82    Resp: 16  18    Temp: 97.8 °F (36.6 °C)  97.1 °F (36.2 °C)    TempSrc: Temporal  Temporal    SpO2: 97%   98%   Weight:  161 lb 6.4 oz (73.2 kg)     Height:         O2: RA      Intake/Output Summary (Last 24 hours) at 12/15/2022 0901  Last data filed at 12/15/2022 0634  Gross per 24 hour   Intake 480 ml   Output 1925 ml   Net -1445 ml       +BM on 12/15    UO: 1125mL/8hr      Recent Labs     12/12/22  1100 12/13/22  0315 12/14/22  0415   WBC 9.7 11.4 10.3   HGB 9.4* 9.1* 9.2*   HCT 29.2* 28.4* 27.8*    213 168      Recent Labs     12/13/22  0315 12/14/22  0415 12/15/22  0601   BUN 9 7 8   CREATININE 0.5 0.4* 0.4*       Telemetry: SR      PE  Cardiac: RRR  Lungs: decreased bases  Chest incision with intact ANAYA DSD. Sternum stable. Prior chest tube site incisions C/D/I, no erythema with intact sutures. Epicardial pacing wires present and secure. Abd: Soft, nontender, +BS  Ext: Incisions C/D/I, approximated, no erythema, + edema         A/P: POD#3    1. CAD/NSTEMI  --Stable s/p CABG x3 (LIMA-LAD, SVG-OM, SVG-PDA), LAAL (35 mm AtriCure) on 12/12/2022  --Post op AILIN reveals 63% EF on no inotrope  --Scr stable  --DAPT/statin/BB  --reinforce sternal precautions  --epicardial pacing wires cut without difficulty  --chest tubes removed       2. Expected acute blood loss anemia secondary to open heart surgery  --CBC pending - will review      3. NSR  --continue BB with hold parameters        4. Expected acute pulmonary insufficiency in the setting following surgery  --wean oxygen to keep SpO2 greater than or equal to 92%  --continue duonebs with ezpap  --encourage C&DB, SMI  --currently on RA      5.  Constipation--expected delayed return of bowel function  --secondary to anesthesia, narcotics, decreased oral intake, and decreased physical activity   --resolved   --Continue daily MOM/oral bisacodyl until +BM and senna-s as scheduled. --Encouraged continued increase in oral intake and activity. 6. Expected deconditioning in the setting following surgery  --Increase activity as tolerated  --PT/OT  --currently ambulating 400ft      7. Acute post-op pain  --Patient is currently taking scheduled tylenol and refuses any narcotics; tramadol offered and patient refused      8.  Uncontrolled DMII  --Endocrine following and dosing insulin   --Hemoglobin A1c 12.1           DVT prophylaxis:  --continue bilateral knee high CHIARA hose  --continue PCDs  --continue progressive ambulation  --patient consistently ambulating 400ft      Dispo: home later today         This patient's case and care plan was discussed with the attending surgeon

## 2022-12-15 NOTE — PLAN OF CARE
Problem: Chronic Conditions and Co-morbidities  Goal: Patient's chronic conditions and co-morbidity symptoms are monitored and maintained or improved  Outcome: Progressing     Problem: Discharge Planning  Goal: Discharge to home or other facility with appropriate resources  Outcome: Progressing     Problem: Safety - Adult  Goal: Free from fall injury  Outcome: Progressing     Problem: ABCDS Injury Assessment  Goal: Absence of physical injury  Outcome: Progressing     Problem: Pain  Goal: Verbalizes/displays adequate comfort level or baseline comfort level  Outcome: Progressing     Problem: Respiratory - Adult  Goal: Achieves optimal ventilation and oxygenation  Outcome: Progressing     Problem: Cardiovascular - Adult  Goal: Maintains optimal cardiac output and hemodynamic stability  Outcome: Progressing  Goal: Absence of cardiac dysrhythmias or at baseline  Outcome: Progressing     Problem: Skin/Tissue Integrity - Adult  Goal: Skin integrity remains intact  Outcome: Progressing  Flowsheets (Taken 12/14/2022 2150)  Skin Integrity Remains Intact: Monitor for areas of redness and/or skin breakdown  Goal: Incisions, wounds, or drain sites healing without S/S of infection  Outcome: Progressing     Problem: Gastrointestinal - Adult  Goal: Minimal or absence of nausea and vomiting  Outcome: Progressing  Goal: Maintains or returns to baseline bowel function  Outcome: Progressing     Problem: Genitourinary - Adult  Goal: Absence of urinary retention  Outcome: Progressing  Goal: Urinary catheter remains patent  Outcome: Progressing     Problem: Infection - Adult  Goal: Absence of infection at discharge  Outcome: Progressing  Goal: Absence of infection during hospitalization  Outcome: Progressing     Problem: Hematologic - Adult  Goal: Maintains hematologic stability  Outcome: Progressing     Problem: Skin/Tissue Integrity  Goal: Absence of new skin breakdown  Description: 1.   Monitor for areas of redness and/or skin

## 2022-12-15 NOTE — PROGRESS NOTES
DAILY PROGRESS NOTE -[facility] CARDIOLOGY    SUBJECTIVE:    Looking good this morning and out of ICU. Now on telemetry. Sitting in a bedside chair with no new complaints. Sinus rhythm on telemetry. CABG 12/13 with LIMA-LAD, veins to obtuse marginal, PDA and left atrial appendage ligation after non-STEMI. Preoperative ejection fraction 45-50% with mid-distal anterior and apical hypokinesis. Hypertension, severe hypertriglyceridemia, diabetes, CAD post inferior infarct and RCA drug-eluting stent 2017. OBJECTIVE:    Her vital signs were reviewed today. Blood pressure 120/70, pulse 75 and regular, respiratory rate 16. Vitals:    12/15/22 0815   BP:    Pulse:    Resp:    Temp:    SpO2: 98%       Scheduled Meds:   insulin lispro  5 Units SubCUTAneous TID WC    aspirin  81 mg Oral Daily    bisacodyl  5 mg Oral Daily    sennosides-docusate sodium  1 tablet Oral BID    magnesium hydroxide  30 mL Oral Daily    ferrous sulfate  325 mg Oral BID WC    vitamin C  500 mg Oral BID    folic acid  1 mg Oral Daily    pantoprazole  40 mg Oral Daily    metoprolol succinate  25 mg Oral Daily    insulin glargine  18 Units SubCUTAneous Nightly    lidocaine  1 patch TransDERmal Daily    insulin lispro  0-8 Units SubCUTAneous TID WC    insulin lispro  0-4 Units SubCUTAneous Nightly    sodium chloride flush  5-40 mL IntraVENous 2 times per day    magnesium oxide  400 mg Oral BID    mupirocin   Nasal BID    ipratropium-albuterol  1 ampule Inhalation Q4H WA    clopidogrel  75 mg Oral Daily    ezetimibe  10 mg Oral Nightly    atorvastatin  80 mg Oral Nightly     Continuous Infusions:   dextrose       PRN Meds:.traMADol, amiodarone, amiodarone bolus, magnesium sulfate, sodium chloride, trimethobenzamide, potassium chloride, bisacodyl, acetaminophen, sodium chloride flush, glucose, dextrose bolus **OR** dextrose bolus, glucagon (rDNA), dextrose    REVIEW OF SYSTEMS:     No pruritus, rash, bruising.   Cardiac and pulmonary symptoms per HPI. No nausea, vomiting, abdominal pain, GI bleeding, change in bowel habits. No dysuria, urinary frequency, urgency, hematuria, flank pain. Joint pain but no muscle soreness, stiffness, aching. No headache, speech disturbance, lateralizing neurologic deficit. No hemoptysis, epistaxis, easy bruising. Persistent anxiety, but no depression. No symptoms of hypothyroidism, hyperthyroidism, diabetes, heat or cold intolerance. FAMILY HISTORY: Negative for CAD in first-degree relatives. SOCIAL HISTORY: Negative for alcohol, tobacco, or illicit drug use. PHYSICAL EXAM:    General Appearance: in no acute distress  Neck:  no bruits  Lungs:  Normal expansion. Clear to auscultation. No rales, rhonchi, or wheezing. Heart:  Heart sounds are normal.  Regular rate and rhythm without murmur, gallop or rub. Abdomen:  Soft, non-tender. Extremities: Extremities warm to touch, pink, with no edema. Neuro/musculosketal:  Unremarkable. LABS:    Recent Labs     12/13/22  0315 12/14/22  0415 12/15/22  0601    135 137   CREATININE 0.5 0.4* 0.4*       Recent Labs     12/12/22  1100 12/13/22  0315 12/14/22  0415   HGB 9.4* 9.1* 9.2*       Recent Labs     12/12/22  1100 12/13/22  0315   INR 1.3 1.3           IMPRESSION:    1.  CAD post non-STEMI with surgical disease-looking very good after three-vessel CABG and left atrial appendage ligation. Off of pressors. Continue indefinite aspirin. Does she require dual antiplatelet therapy with clopidogrel and aspirin. To my knowledge, not currently indicated from cardiac standpoint. 2.  Ischemic cardiomyopathy-echocardiogram as above. Restart metoprolol succinate 25 mg daily this morning for cardioprotection. Has a cough from lisinopril and plan to use valsartan post surgery for cardioprotection if blood pressure can tolerate. 3.  Severe hyperlipidemia-triglycerides 1200 and LDL unable to be calculated.   High-dose atorvastatin along with Zetia to achieve

## 2022-12-15 NOTE — PROGRESS NOTES
Reason for consult:  uncontrolled DM  Insulin instruction    A1C:   12.1%                       Patient states the following concerns/barriers to diabetes self-management:     [x] None       [] Medication cost   [] Food cost/availability        [] Reading  [] Hearing   [] Vision                [] Work    [] Transportation  [] No insurance  [] Physical limitations    [] Other:        Patient states the following about their diabetes/health:   [x]   Hx of DM and stated good control until now [place] lot of stress\"  [x]   Has meter and supplies  [x]   Knows how to read labels and I ask that the RD see her before discharge    Diabetes survival packet provided to:   [x] Patient     [] Other:    Information reviewed:   Definition of diabetes   Target glucose ranges/A1C   Self-monitoring of blood glucose   Prevention/symptoms/treatment of hypo-/hyperglycemia   Medication adherence   The plate method/meal planning guidelines   The benefits of exercise and recommendations   Reducing the risk of chronic complications      Diabetes medications reviewed (use, purpose, action, timing, storage): Humalog and Lantus reviewed. Instructed on an insulin pen with written/pictorial instructions. Demo given. Feels she can do this at home with instructions. I ask that the nurse let her give her own insulin with lunch. Pt agrees to do. Site selection and rotation reviewed with rationale. Needle disposal reviewed.             Post-education Assessment  [x]  Attentive to teaching  [x]  Answered questions appropriately when asked   [x]  Seems able to apply concepts to daily lifestyle  [x]  Seems motivated to do well  [x]  Verbalized an understanding of CHO counting and label reading  RD to see  [x]  Verbalized an understanding of prescribed antidiabetes medications   [x]  Verbalized an understanding of target glucose ranges/A1C level  [x]  Expresses an intent to comply with treatment plan but hoping to get off insulin  []  Showed very little interest in complying with treatment plan   []  Seems to have trouble applying concepts to daily lifestyle       COMMENTS:  60 minute session. Contact numbers given and encouraged to use. ADA web site given. Class information with book. Recommendations:   [x] Carbohydrate-controlled diet    [] Script for glucometer and supplies (per preference of patient's insurance)  [x] Script for insulin pens and pen needles (if insulin is ordered at discharge for home use)   [] Consult to social work: patient has no insurance or has financial hardship  [x] Inpatient consult to endocrinologist   [x] Follow up with endocrinologist as an outpatient   [] Home healthcare nursing to increase compliance to treatment plan   [] Script for outpatient diabetes education classes (from doctor)        Thank you for this consult.     Casandra Arias RN [facility]

## 2022-12-15 NOTE — PROGRESS NOTES
[facility] Quality Flow/Interdisciplinary Rounds Progress Note        Quality Flow Rounds held on December 15, 2022    Disciplines Attending:  Bedside Nurse, , , and Nursing Unit Leadership    Lorenza Ballard was admitted on 12/7/2022  5:00 PM    Anticipated Discharge Date:       Disposition:    Barrington Score:  Barrington Scale Score: 19    Readmission Risk              Risk of Unplanned Readmission:  14           Discussed patient goal for the day, patient clinical progression, and barriers to discharge.   The following Goal(s) of the Day/Commitment(s) have been identified:  Discharge - [facility] Pratibha Larson RN  December 15, 2022

## 2022-12-15 NOTE — DISCHARGE INSTRUCTIONS
Discharge Instructions for Open Heart Surgery    What you will need at home:               * Accurate Scale               *  Digital Thermometer               *  Antibacterial Soap                *  Clean Wash Cloths             *  Someone to be with you for one week              DO NOT LIFT, PUSH, OR PULL ANYTHING OVER 5 POUNDS FOR 8 WEEK from the day of surgery. This could prevent your sternum from healing properly. ? When you are given permission from your surgeon to begin lifting again,                     do so gradually. You will need time to build your muscle strength. ? A gallon of milk is more than 5 lbs. you should buy ½ gallons. NEVER SMOKE AGAIN! Absolutely no tobacco products! Do not allow others to smoke around you. Second hand smoke can be just as bad. The [employer] has a free program available, call 7-200.654.7134. Activity: See your activity diary in your binder for your walking plan. Plan to walk indoors on days the temperature is below 40? or over 80? or during smog alerts. At first limit your stair climbing to once or twice a day. You may use the handrail for balance only. Do not pull yourself up with it. It is not unusual to feel tired for the first few weeks, but walking builds up your strength. Driving: Do not drive a car or any vehicle for 4 weeks from the day of surgery. You can not drive a truck, tractor or  for 8 weeks from the day of surgery. After 4 weeks, you can drive vehicles with power steering only. Do not drive while on pain medication. Incentive Spirometer:  Continue to use your lung exerciser for the next 4 weeks. Support your chest and cough each time you complete the 10 exercises. Weight:  Weigh yourself every morning. Call the surgeon if you gain 3 pounds or more overnight or 5 pounds in a week. This may be a sign of fluid retention.     Medication:    Pain pills are ordered to keep you comfortable and able to increase activity         level. Your need for pain pills should decrease over the next 2 weeks. For mild pain you take Tylenol, but do not exceed 4000mg of Tylenol a day. Vicodin contains Tylenol,  so watch how much Tylenol (Acetaminophen) you take  Stool Softners: You may have been prescribed Colace (docusate), to help prevent straining with bowel movements. If your bowels have not moved by the second day home, take a laxative of your choice. If no bowel movement by the third day, call your surgeon. If you find you have the opposite problem and your stools are too soft, stop taking the stool softener. Avoid herbal, dietary products and vitamins unless OKd by your surgeon. If on Coumadin monitor Vitamin K intake and keep it consistent. Call during business hours Monday through Friday for medication refills. [phone]      Incision Care:  [facility] YOUR INCISIONS DAILY WITH A CLEAN WASHCLOTH AND ANTIBACTERIAL SOAP. Do not wash your incisions after you have cleansed other parts of your body. You may shower but keep your back to the spray. Avoid hot water, comfortably warm is OK. ? If you went home with a dressing on any incision: Remove the dressing daily     and wash the incision with antibacterial soap and water and pat dry. Only     cover the incision with a dressing if it is draining. Otherwise leave it     uncovered (unless your doctor told you otherwise)  ? No tub baths until your incisions are healed. ? DO NOT APPLY ANYTHING OTHER THAN ANTIBACTERIAL SOAP AND     WATER TO YOUR INCISIONS. Do not apply lotions, creams, ointments,     hydrogen peroxide or powder. ? Keep your incisions CLEAN. Think CLEAN. No one should touch your     incisions without washing their hands first.  Do not let pets touch your incisions     (have incisions covered with clothing when around pets). Keep your heart     pillow clean and off the floor. ?  Expect some swelling in the leg with the incision. Keep your legs elevated     above the level of your heart when lying or sitting. ? Wear loose clothing. For support women should wear a bra. CHIARA Hose (white stockings): Should be worn during the day and off at night. Someone other than the patient will need to put on and take off the hose. It is too much pulling and tugging for the patient. Expect them to be difficult to put on and they should feel snug. These are usually worn for 2-4 weeks. Wash them by hand to keep their elasticity. Diet:  Eat a low fat, low salt diet. Eat a high fiber diet to avoid constipation and straining during bowel movements (whole grains, raw veggies, fruits)    Diabetics:  Check blood sugars twice a day. Contact your primary care physician if your blood sugar is consistently above 130. Good tissue healing occurs when blood sugars are less than 130. Housework: Do not cut the grass, vacuum, sweep or do any heavy housework. Riding: You may ride in the car for local trips, up to 45 minutes. If it you have to travel further stop every 45 min. Wear your lap and shoulder belts in the car. Place your heart pillow between your chest and the shoulder belt. Sexual Activity: When you can climb 2 flights of stairs without chest pain, shortness of breath or fatigue, it is generally OK to resume sexual activity. Depression: It is not unusual to have feelings of anxiety, fear or depression after surgery. If you need help with these feelings, call your primary care physician. There are medications to help and healing usually occurs sooner is youre not depressed. Heart Valve Replacement:  If you had your heart valve replaced you should receive a card for your wallet. Show ALL your doctors and dentist the card before treatment. You will need to take antibiotics before and after surgery, teeth cleaning etc. for the rest of your life.     ? If you get a sore throat or fever over 101? that lasts more than a day or     two call your doctor. ? If you are exposed to someone with strept throat, then get a sore throat     yourself, call your doctor IMMEDIATELY. Any doubts about taking antibiotics before or after a procedure call your surgeons office. When to call the doctor:  [phone]    If you have sudden, severe shortness of breath or shortness of breath while resting. Pain, tenderness, warmth or redness in your calf. Weight gain of 3 pounds in one day or 5 pounds in one week. If your heart rate is below 50 or over 110 beats per minute, or symptoms of fluttering in your chest or irregular pulse. Temperature (taken daily) greater than 101? Gela Menon If your bowels have not moved by the third day home. Persistent diarrhea, nausea or vomiting. If you are unable to eat, or unable to drink 5 glasses of fluid a day for more than one day. For redness, warmth, tenderness, drainage or swelling of any incision. For ANY chest incision drainage. If you have pain not relieved by pain medication. If you experience the same pain or other symptoms that brought you to the hospital or doctor before surgery. Diabetics:  Call Primary Care Physician for blood sugars consistently above 130. Depression:  Call Primary Care Physician if feelings of depression persist.      If you think something is seriously wrong go to the nearest emergency room! Call 911 for any EMERGENCY and go to the nearest hospital!    Future Appointments   Date Time Provider Marcos Cedeno   12/20/2022  1:40 PM Joo Ozuna, APRN - CNS BDM ENDO [facility]   1/10/2023  1:15 PM Moriah Segundo DO CARDIO SURG [facility]   1/27/2023  3:30 PM DO JIMENEZ [facility]     Cardiac Rehabilitation: Discharge instructions        Cardiac rehabilitation is a program for people who have a heart problem, such as a heart attack, coronary stent placed, heart failure, or a heart valve disease.  The program includes exercise, lifestyle changes, education, and emotional support. Cardiac rehab can help you improve the quality of your life through better overall health. It can help you lose weight and feel better about yourself. On your cardiac rehab team, you may have your doctor, a nurse specialist, an exercise physiologist, and a dietitian. They will design your cardiac rehab program specifically for you. You will learn how to reduce your risk for heart problems, how to manage stress, and how to eat a heart-healthy diet. By the end of the program, you will be ready to maintain a healthier lifestyle on your own. Follow-up care is a key part of your treatment and safety. Be sure to make and go to all appointments, and call your doctor if you are having problems. It's also a good idea to know your test results and keep a list of the medicines you take. Please call to schedule your first appointment once you have been cleared by your cardiologist to attend Phase II Outpatient Cardiac Rehabilitation. Cardiac Rehabilitation Options:  [address] [place] [address]. Cardiology Services  [place], [address]                                                                              Carolina Peter [address] [place] [address] [phone]                                                                                         [address]  Hours: M/W/F 7am-7pm & T/Th 7am-12pm                                                  [phone]                                                                                                                          [phone] Professor [address]  Cardiac Rehab  [facility].                                                                                          [place] , [address] [phone]                                                                                         Cardiac Rehabilitation  Hours: M/W/F 7am-6pm                                                                                [facility] [place], [address]  [facility]                                                          [phone]  Cardiac Rehabilitation                                                                                   [phone]  [address] [place] [address] Dr, [place] [place] [address]                                                                                        [address] [facility]  [phone]                                                                                          Cardiac Rehabilitation  [phone]                                                                                          [address] [facility] [address]  [address], [facility]                                                                                                                        [phone]                                                                                                                        [phone]

## 2022-12-17 PROBLEM — Z95.1 S/P CABG (CORONARY ARTERY BYPASS GRAFT): Status: ACTIVE | Noted: 2022-12-17

## 2022-12-20 ENCOUNTER — OFFICE VISIT (OUTPATIENT)
Dept: ENDOCRINOLOGY | Age: 62
End: 2022-12-20

## 2022-12-20 VITALS
OXYGEN SATURATION: 97 % | BODY MASS INDEX: 26.52 KG/M2 | SYSTOLIC BLOOD PRESSURE: 131 MMHG | HEART RATE: 78 BPM | HEIGHT: 66 IN | WEIGHT: 165 LBS | DIASTOLIC BLOOD PRESSURE: 73 MMHG

## 2022-12-20 DIAGNOSIS — E11.59 TYPE 2 DIABETES MELLITUS WITH CARDIAC COMPLICATION (HCC): ICD-10-CM

## 2022-12-20 DIAGNOSIS — E11.65 UNCONTROLLED TYPE 2 DIABETES MELLITUS WITH HYPERGLYCEMIA (HCC): Primary | ICD-10-CM

## 2022-12-20 DIAGNOSIS — Z91.119 DIETARY NONCOMPLIANCE: ICD-10-CM

## 2022-12-20 DIAGNOSIS — E78.1 HYPERTRIGLYCERIDEMIA: ICD-10-CM

## 2022-12-20 PROCEDURE — 3074F SYST BP LT 130 MM HG: CPT | Performed by: CLINICAL NURSE SPECIALIST

## 2022-12-20 PROCEDURE — 3078F DIAST BP <80 MM HG: CPT | Performed by: CLINICAL NURSE SPECIALIST

## 2022-12-20 PROCEDURE — 99214 OFFICE O/P EST MOD 30 MIN: CPT | Performed by: CLINICAL NURSE SPECIALIST

## 2022-12-20 PROCEDURE — 3046F HEMOGLOBIN A1C LEVEL >9.0%: CPT | Performed by: CLINICAL NURSE SPECIALIST

## 2022-12-20 RX ORDER — GLIPIZIDE 10 MG/1
15 TABLET ORAL 2 TIMES DAILY
COMMUNITY

## 2022-12-20 RX ORDER — GLIPIZIDE 5 MG/1
15 TABLET ORAL
COMMUNITY
End: 2022-12-21

## 2022-12-20 NOTE — PROGRESS NOTES
[address] Department of Endocrinology Diabetes and Metabolism   [address] [facility] [address]   Phone: [phone]  Fax: [phone]    Date of Service: 12/20/2022    Primary Care Physician: Rachelle Reyes MD  Referring physician: No ref. provider found  Provider: ILA Yuen     Reason for the visit:      History of Present Illness: The history is provided by the patient. No  was used. Accuracy of the patient data is excellent. Gustabo Zelaya is a very pleasant 57 y.o. female seen today for diabetes management     Gustabo Zelaya was diagnosed with diabetes dx at the age of 62  and currently on Glipizde 10 mg in am and 5 mg at bedtime   The patient has been checking blood sugar 3 times per day   -low 200's.     Could not tolerate metformin as it caused diarrhea  Recently hospitalized for CABG  Was discharged on insulin however she could not afford it  Most recent A1c results summarized below  Lab Results   Component Value Date/Time    LABA1C 12.1 12/08/2022 08:03 AM    LABA1C 12.0 12/07/2022 02:10 AM    LABA1C 12.3 12/06/2022 07:34 AM     Patient has had no hypoglycemic episodes   The patient has been mindful of what has been eating and following diabetic diet as encouraged  I reviewed current medications and the patient has no issues with diabetes medications  The patient is due for an eye exam. Last eye exam was > 1 year  ago  , no h/o diabetic retinopathy  Performs  own feet care  Microvascular complications:  No Retinopathy, Nephropathy or Neuropathy   Macrovascular complications: + CAD, PVD, or Stroke  No HX of pancreatitis  No Hx of MTC  No HX of gastroparesis   No HX of UTI/Mycotic infection         PAST MEDICAL HISTORY   Past Medical History:   Diagnosis Date    2019 novel coronavirus disease (COVID-19) 11/10/2021    Coronary artery disease involving native coronary artery of native heart without angina pectoris     Diabetes mellitus ([address]     Hyperlipidemia     Hypertension        PAST SURGICAL HISTORY   Past Surgical History:   Procedure Laterality Date     SECTION      3  sections    CORONARY ANGIOPLASTY WITH STENT PLACEMENT  04/10/2017    3.0/38 and 3.0/20 Synergy stents to mid and distal RCA by Dr. Jhoan Dean N/A 2022    CABG CORONARY ARTERY BYPASS performed by Srinivas Parada DO at [facility]   Tobacco:   reports that she has never smoked. She has never used smokeless tobacco.  Alcohol:   reports no history of alcohol use. Drugs:   reports no history of drug use.     FAMILY HISTORY   Family History   Problem Relation Age of Onset    Diabetes Maternal Grandfather     Dementia Mother     High Blood Pressure Father        ALLERGIES AND DRUG REACTIONS   Allergies   Allergen Reactions    Metformin Diarrhea       CURRENT MEDICATIONS   Current Outpatient Medications   Medication Sig Dispense Refill    glipiZIDE (GLUCOTROL) 10 MG tablet Take 15 mg by mouth      glipiZIDE (GLUCOTROL) 5 MG tablet Take 15 mg by mouth      atorvastatin (LIPITOR) 80 MG tablet Take 1 tablet by mouth nightly 30 tablet 3    metoprolol succinate (TOPROL XL) 25 MG extended release tablet Take 1 tablet by mouth daily 30 tablet 3    ferrous sulfate (IRON 325) 325 (65 Fe) MG tablet Take 1 tablet by mouth 2 times daily (with meals) 60 tablet 0    folic acid (FOLVITE) 1 MG tablet Take 1 tablet by mouth daily 30 tablet 0    docusate sodium (COLACE) 100 MG capsule Take 1 capsule by mouth 2 times daily as needed for Constipation 60 capsule 0    clopidogrel (PLAVIX) 75 MG tablet Take 1 tablet by mouth daily 30 tablet 1    pantoprazole (PROTONIX) 40 MG tablet Take 1 tablet by mouth daily for 14 days 14 tablet 0    ascorbic acid (VITAMIN C) 500 MG tablet Take 1 tablet by mouth 2 times daily 60 tablet 0    insulin glargine (LANTUS SOLOSTAR) 100 UNIT/ML injection pen Inject 18 Units into the skin nightly 5 Adjustable Dose Pre-filled Pen Syringe 3    Insulin Pen Needle (BD PEN NEEDLE MICRO U/F) 32G X 6 MM MISC Uses with insulin 4 times a day 250 each 5    insulin lispro, 1 Unit Dial, (HUMALOG KWIKPEN) 100 UNIT/ML SOPN Inject 5 units with meals + following sliding scale. -200 add 2U, -250 add 4U, -300 add 6U, -350 add 8U, -400 add 10U, BS over 400 add 12U 5 Adjustable Dose Pre-filled Pen Syringe 3    Blood Glucose Monitoring Suppl MISC OneTouch ultra Glucometer 1 each 0    ONE TOUCH ULTRASOFT LANCETS MISC Test 4 times/day before meals and at bedtime and as needed for symptoms of irregular blood glucose. 250 each 5    blood glucose monitor strips One-Touch Ultra strips. Check 4 times/day before meals and at bedtime and as needed for symptoms of irregular blood glucose 250 strip 5    aspirin 81 MG chewable tablet Take 81 mg by mouth in the morning and 81 mg in the evening.      magnesium oxide (MAG-OX) 400 MG tablet Take 400 mg by mouth nightly      Omega-3 Fatty Acids (FISH OIL) 1000 MG capsule Take 1,000 mg by mouth 2 times daily      POTASSIUM PO Take 50 mg by mouth every other day      Cholecalciferol (VITAMIN D3) 5000 UNITS TABS Take 5,000 Units by mouth every morning       No current facility-administered medications for this visit. Review of Systems  Constitutional: No fever, no chills, no diaphoresis, no generalized weakness. HEENT: No blurred vision, No sore throat, no ear pain, no hair loss  Neck: denied any neck swelling, difficulty swallowing,   Cardio-pulmonary: No CP, SOB or palpitation, No orthopnea or PND. No cough or wheezing. GI: No N/V/D, no constipation, No abdominal pain, no melena or hematochezia   : Denied any dysuria, hematuria, flank pain, discharge, or incontinence. Skin: denied any rash, ulcer, Hirsute, or hyperpigmentation. MSK: denied any joint deformity, joint pain/swelling, muscle pain, or back pain.   Neuro: no numbness, no tingling, no weakness, _    OBJECTIVE    /73   Pulse 78   Ht 5' 6\" (1.676 m)   Wt 165 lb (74.8 kg)   SpO2 97%   BMI 26.63 kg/m²   BP Readings from Last 4 Encounters:   12/20/22 131/73   12/15/22 (!) 94/59   12/07/22 126/82   01/21/22 132/62     Wt Readings from Last 6 Encounters:   12/20/22 165 lb (74.8 kg)   12/15/22 161 lb 6.4 oz (73.2 kg)   12/06/22 155 lb (70.3 kg)   01/21/22 156 lb (70.8 kg)   11/10/21 155 lb (70.3 kg)   11/13/20 168 lb (76.2 kg)       Physical examination:  General: awake alert, oriented x3, no abnormal position or movements. HEENT: normocephalic non-traumatic, no exophthalmos   Neck: supple, no LN enlargement, no thyromegaly, no thyroid tenderness, no JVD. Pulm: Clear equal air entry no added sounds, no wheezing or rhonchi    CVS: S1 + S2, no murmur, no heave. Dorsalis pedis pulse palpable   Abd: soft lax, no tenderness, no organomegaly, audible bowel sounds. Skin: warm, no lesions, no rash.  No callus, no Ulcers, No acanthosis nigricans  Musculoskeletal: No back tenderness, no kyphosis/scoliosis    Neuro: CN intact, Monofilament sensation present bilateral , muscle power normal  Psych: normal mood, and affect      Review of Laboratory Data:  I personally reviewed the following lab:  Lab Results   Component Value Date/Time    WBC 8.3 12/15/2022 06:01 AM    RBC 3.55 12/15/2022 06:01 AM    HGB 9.4 (L) 12/15/2022 06:01 AM    HCT 31.7 (L) 12/15/2022 06:01 AM    HCT 27.0 (L) 12/12/2022 10:19 AM    MCV 89.3 12/15/2022 06:01 AM    MCH 26.5 12/15/2022 06:01 AM    MCHC 29.7 (L) 12/15/2022 06:01 AM    RDW 14.5 12/15/2022 06:01 AM     12/15/2022 06:01 AM    MPV 11.0 12/15/2022 06:01 AM      Lab Results   Component Value Date/Time     12/15/2022 06:01 AM    K 3.8 12/15/2022 06:01 AM    K 4.2 12/12/2022 05:46 AM    CO2 19 (L) 12/15/2022 06:01 AM    BUN 8 12/15/2022 06:01 AM    CREATININE 0.4 (L) 12/15/2022 06:01 AM    CALCIUM 8.9 12/15/2022 06:01 AM    LABGLOM >60 12/15/2022 06:01 AM GFRAA >60 04/11/2017 04:15 AM      No results found for: TSH, T4FREE, Y4RMWNE, FT3, I0NECIA, TSI, TPOABS, THGAB  Lab Results   Component Value Date/Time    LABA1C 12.1 12/08/2022 08:03 AM    GLUCOSE 185 12/15/2022 06:01 AM    GLUCOSE 186 11/08/2010 08:28 AM     Lab Results   Component Value Date/Time    LABA1C 12.1 12/08/2022 08:03 AM    LABA1C 12.0 12/07/2022 02:10 AM    LABA1C 12.3 12/06/2022 07:34 AM     Lab Results   Component Value Date/Time    TRIG 2,539 12/06/2022 07:34 AM    HDL 21 12/06/2022 07:34 AM    LDLCALC - 12/06/2022 07:34 AM    CHOL 449 12/06/2022 07:34 AM     No results found for: Tianna Morrissey, a 57 y.o.-old female seen in for the following issues       Assessment:      Diagnosis Orders   1. Uncontrolled type 2 diabetes mellitus with hyperglycemia ([address]        2. Type 2 diabetes mellitus with cardiac complication (HCC)        3. Dietary noncompliance        4. Hypertriglyceridemia            Plan:     1. Uncontrolled type 2 diabetes mellitus with hyperglycemia ([address]   Patient's diabetes variable  Patient was discharged from hospital on insulin but could not afford it  Patient will be on Medicare 1/2023   Patient does not wish to continue insulin at this point  Will continue glipizide 10 mg in the morning and 5 mg with dinner  Will start Jardiance 10 mg 1 tablet daily  No contraindications.   Counseled on side effects  The patient was advised to check blood sugars twice per day fasting and before dinner  Send blood glucose log in 2 weeks  Counseled on the importance of optimal blood glucose control given recent history of CABG  Discussed with patient A1c and blood sugar goals   Optimal blood sugars: 100-140 pre-prandial, < 180 peak post-prandial  The patient counseled about the complications of uncontrolled diabetes   Patient was counselled about the importance of self-blood glucose monitoring and eating consistent carb diet to avoid blood sugar fluctuations Patient will need routine diabetes maintenance and prevention  The patient will meet with dietitian at today's visit  Discussed lifestyle changes including diet and exercise with patient  Advised follow-up with ophthalmology   2. Type 2 diabetes mellitus with cardiac complication [facility])   Following with cardiology   3. Dietary noncompliance   Discussed with patient the importance of eating consistent carbohydrate meals, avoiding high glycemic index food. Also, discussed with patient the risk and negative consequences of dietary noncompliance on blood glucose control, blood pressure and weight  Patient will meet with dietitian   4. Hypertriglyceridemia   Continue atorvastatin 80 mg daily   On fish oil 1 g twice daily  Counseled on optimal blood glucose control  Will reassess once blood glucose levels optimize         I personally spent > 30 minutes reviewing external notes from PCP and other patient's care team providers, and personally interpreted labs associated with the above diagnosis. I also ordered labs to further assess and manage the above addressed medical conditions. Return in about 3 months (around 3/20/2023). The above issues were reviewed with the patient who understood and agreed with the plan. Thank you for allowing us to participate in the care of this patient. Please do not hesitate to contact us with any additional questions. ILA Gama     [facility] Diabetes Care and Endocrinology   [address] [place] [address]   Phone: [phone]  Fax: [phone]  --------------------------------------------  An electronic signature was used to authenticate this note.  ILA Gama on 12/20/2022 at 1:58 PM

## 2022-12-21 ENCOUNTER — OFFICE VISIT (OUTPATIENT)
Dept: PRIMARY CARE CLINIC | Age: 62
End: 2022-12-21

## 2022-12-21 VITALS
OXYGEN SATURATION: 100 % | WEIGHT: 162.25 LBS | HEIGHT: 66 IN | DIASTOLIC BLOOD PRESSURE: 66 MMHG | TEMPERATURE: 96.7 F | BODY MASS INDEX: 26.08 KG/M2 | SYSTOLIC BLOOD PRESSURE: 138 MMHG | HEART RATE: 68 BPM

## 2022-12-21 DIAGNOSIS — I25.5 ISCHEMIC CARDIOMYOPATHY: ICD-10-CM

## 2022-12-21 DIAGNOSIS — I25.10 CORONARY ARTERY DISEASE INVOLVING NATIVE CORONARY ARTERY OF NATIVE HEART WITHOUT ANGINA PECTORIS: ICD-10-CM

## 2022-12-21 DIAGNOSIS — Z95.1 S/P CABG (CORONARY ARTERY BYPASS GRAFT): ICD-10-CM

## 2022-12-21 DIAGNOSIS — Z09 HOSPITAL DISCHARGE FOLLOW-UP: ICD-10-CM

## 2022-12-21 DIAGNOSIS — E11.65 POORLY CONTROLLED TYPE 2 DIABETES MELLITUS (HCC): ICD-10-CM

## 2022-12-21 DIAGNOSIS — I21.4 NSTEMI (NON-ST ELEVATED MYOCARDIAL INFARCTION) (HCC): Primary | ICD-10-CM

## 2022-12-21 PROBLEM — G89.18 ACUTE POSTOPERATIVE PAIN: Status: RESOLVED | Noted: 2022-12-12 | Resolved: 2022-12-21

## 2022-12-21 PROBLEM — R73.9 HYPERGLYCEMIA: Status: RESOLVED | Noted: 2022-12-12 | Resolved: 2022-12-21

## 2022-12-21 PROBLEM — Z91.119 DIETARY NONCOMPLIANCE: Status: RESOLVED | Noted: 2022-12-10 | Resolved: 2022-12-21

## 2022-12-21 PROBLEM — I95.81 POSTOPERATIVE HYPOTENSION: Status: RESOLVED | Noted: 2022-12-12 | Resolved: 2022-12-21

## 2022-12-21 PROBLEM — T81.9XXA COMPLICATION OF SURGICAL PROCEDURE: Status: RESOLVED | Noted: 2022-12-12 | Resolved: 2022-12-21

## 2022-12-21 PROBLEM — U07.1 2019 NOVEL CORONAVIRUS DISEASE (COVID-19): Status: RESOLVED | Noted: 2021-11-10 | Resolved: 2022-12-21

## 2022-12-21 RX ORDER — CYANOCOBALAMIN/FOLIC ACID 1MG-400MCG
TABLET, SUBLINGUAL SUBLINGUAL
COMMUNITY

## 2022-12-21 RX ORDER — CHOLECALCIFEROL (VITAMIN D3) 1250 MCG
CAPSULE ORAL
COMMUNITY

## 2022-12-21 RX ORDER — FERROUS SULFATE 325(65) MG
325 TABLET ORAL
COMMUNITY

## 2022-12-21 RX ORDER — CHLORAL HYDRATE 500 MG
CAPSULE ORAL 2 TIMES DAILY
COMMUNITY

## 2022-12-21 RX ORDER — ASPIRIN 81 MG/1
81 TABLET ORAL 2 TIMES DAILY
COMMUNITY

## 2022-12-21 RX ORDER — ASCORBIC ACID 500 MG
500 TABLET ORAL 2 TIMES DAILY
COMMUNITY

## 2022-12-21 ASSESSMENT — ENCOUNTER SYMPTOMS
SHORTNESS OF BREATH: 0
DIARRHEA: 0
WHEEZING: 0
ABDOMINAL PAIN: 0
VOMITING: 0
BACK PAIN: 0
CONSTIPATION: 0
COUGH: 0
NAUSEA: 0

## 2022-12-21 NOTE — PROGRESS NOTES
Post-Discharge Transitional Care Follow Up      Fabián Peralta   YOB: 1960    Date of Office Visit:  12/21/2022  Date of Hospital Admission: 12/7/22  Date of Hospital Discharge: 12/15/22  Readmission Risk Score (high >=14%. Medium >=10%):Readmission Risk Score: 11.3      Care management risk score Rising risk (score 2-5) and Complex Care (Scores >=6): No Risk Score On File     Non face to face  following discharge, date last encounter closed (first attempt may have been earlier): *No documented post hospital discharge outreach found in the last 14 days     Call initiated 2 business days of discharge: *No response recorded in the last 14 days     NSTEMI (non-ST elevated myocardial infarction) [facility])    Coronary artery disease involving native coronary artery of native heart without angina pectoris  Now on appropriate medications. Tolerating well. Will see Dr. Cristian Castillo in March    S/P CABG (coronary artery bypass graft)    Ischemic cardiomyopathy    Poorly controlled type 2 diabetes mellitus [facility])  Following with [address] discharge follow-up  -     MT DISCHARGE MEDS RECONCILED W/ CURRENT OUTPATIENT MED LIST    Medical Decision Making: high complexity  Return in about 3 months (around 3/21/2023), or if symptoms worsen or fail to improve, for AWV. On this date 12/21/2022 I have spent 45 minutes reviewing previous notes, test results and face to face with the patient discussing the diagnosis and importance of compliance with the treatment plan as well as documenting on the day of the visit. Subjective:     Inpatient course: Discharge summary reviewed- see chart. Interval history/Current status: 56 yo female presents for hospital discharge follow up. Noted issues with chest pressure that persisted throughout the day. Already had previous history of CAD with PCI. In the ER, EKG stable, but elevated troponins.   Transferred downtown and had catheterization that demonstrated multiple vessel disease. Underwent CABG with Dr. Joan Gomez on 12/12. Echo demonstrated EF of 40-45%. Noted to have very elevated blood sugars and A1c above 12%. Seen in consultation by endocrinology. Medications adjusted. Feeling well today. Tenderness at incision site. Patient Active Problem List   Diagnosis    Benign essential hypertension    HLD (hyperlipidemia)    Poorly controlled type 2 diabetes mellitus ([address]    Coronary artery disease involving native coronary artery of native heart without angina pectoris    NSTEMI (non-ST elevated myocardial infarction) [facility])    History of right coronary artery stent placement    RBBB (right bundle branch block)    S/P CABG (coronary artery bypass graft)    Ischemic cardiomyopathy       Medications listed as ordered at the time of discharge from hospital     Medication List            Accurate as of December 21, 2022  1:26 PM. If you have any questions, ask your nurse or doctor.                 CONTINUE taking these medications      aspirin 81 MG EC tablet     atorvastatin 80 MG tablet  Commonly known as: LIPITOR  Take 1 tablet by mouth nightly     B-12 1000-400 MCG Subl     clopidogrel 75 MG tablet  Commonly known as: PLAVIX  Take 1 tablet by mouth daily     Co Q 10 100 MG Caps     empagliflozin 10 MG tablet  Commonly known as: JARDIANCE     ferrous sulfate 325 (65 Fe) MG tablet  Commonly known as: IRON 325     fish oil 1000 MG capsule     glipiZIDE 10 MG tablet  Commonly known as: GLUCOTROL     magnesium oxide 400 MG tablet  Commonly known as: MAG-OX     metoprolol succinate 25 MG extended release tablet  Commonly known as: TOPROL XL  Take 1 tablet by mouth daily     Potassium 99 MG Tabs     vitamin C 500 MG tablet  Commonly known as: ASCORBIC ACID     Vitamin D3 1.25 MG (83341 UT) Caps               Medications marked \"taking\" at this time  Outpatient Medications Marked as Taking for the 12/21/22 encounter (Office Visit) with Merline Broody, DO   Medication Sig Dispense Refill    aspirin 81 MG EC tablet Take 81 mg by mouth in the morning and at bedtime      Omega-3 Fatty Acids (FISH OIL) 1000 MG capsule Take by mouth 2 times daily      Cholecalciferol (VITAMIN D3) 1.25 MG (70056 UT) CAPS Take by mouth      vitamin C (ASCORBIC ACID) 500 MG tablet Take 500 mg by mouth 2 times daily      ferrous sulfate (IRON 325) 325 (65 Fe) MG tablet Take 325 mg by mouth daily (with breakfast)      Potassium 99 MG TABS Take by mouth Every other day      Cobalamin Combinations (B-12) 1000-400 MCG SUBL Place under the tongue      Coenzyme Q10 (CO Q 10) 100 MG CAPS Take by mouth      glipiZIDE (GLUCOTROL) 10 MG tablet Take 15 mg by mouth in the morning and at bedtime Take 1 in the am and 0.5 at night      atorvastatin (LIPITOR) 80 MG tablet Take 1 tablet by mouth nightly 30 tablet 3    metoprolol succinate (TOPROL XL) 25 MG extended release tablet Take 1 tablet by mouth daily 30 tablet 3    clopidogrel (PLAVIX) 75 MG tablet Take 1 tablet by mouth daily 30 tablet 1    magnesium oxide (MAG-OX) 400 MG tablet Take 400 mg by mouth nightly          Medications patient taking as of now reconciled against medications ordered at time of hospital discharge: Yes    Review of Systems   Constitutional:  Negative for chills, fatigue and fever. Respiratory:  Negative for cough, shortness of breath and wheezing. Cardiovascular:  Negative for chest pain and palpitations. Gastrointestinal:  Negative for abdominal pain, constipation, diarrhea, nausea and vomiting. Musculoskeletal:  Negative for arthralgias and back pain. Skin:  Negative for rash. Neurological:  Negative for dizziness and headaches. Psychiatric/Behavioral:  Negative for dysphoric mood. The patient is not nervous/anxious. All other systems reviewed and are negative.     Objective:    /66   Pulse 68   Temp (!) 96.7 °F (35.9 °C)   Ht 5' 6\" (1.676 m)   Wt 162 lb 4 oz (73.6 kg)   SpO2 100%   BMI 26.19 kg/m²   Physical Exam  Vitals and nursing note reviewed. Constitutional:       General: She is not in acute distress. Appearance: Normal appearance. She is well-developed and normal weight. She is not ill-appearing. HENT:      Head: Normocephalic and atraumatic. Right Ear: Hearing and external ear normal.      Left Ear: Hearing and external ear normal.      Nose: Nose normal.      Mouth/Throat:      Mouth: Mucous membranes are moist.   Eyes:      General: Lids are normal. No scleral icterus. Extraocular Movements: Extraocular movements intact. Conjunctiva/sclera: Conjunctivae normal.   Neck:      Thyroid: No thyromegaly. Vascular: No carotid bruit. Cardiovascular:      Rate and Rhythm: Normal rate and regular rhythm. Heart sounds: Normal heart sounds. No murmur heard. Pulmonary:      Effort: Pulmonary effort is normal. No respiratory distress. Breath sounds: Normal breath sounds. No wheezing. Chest:      Comments: CABG scar noted  Musculoskeletal:         General: No tenderness or deformity. Normal range of motion. Cervical back: Normal range of motion and neck supple. Right lower leg: No edema. Left lower leg: No edema. Lymphadenopathy:      Cervical: No cervical adenopathy. Skin:     General: Skin is warm and dry. Findings: No rash. Neurological:      General: No focal deficit present. Mental Status: She is alert and oriented to person, place, and time. Gait: Gait normal.   Psychiatric:         Mood and Affect: Mood and affect normal.         Speech: Speech normal.         Behavior: Behavior normal.         Thought Content: Thought content normal.       An electronic signature was used to authenticate this note.   --Justine Martins DO

## 2022-12-21 NOTE — PROCEDURES
[address]                  [address] [facility]  [facility]                               PULMONARY FUNCTION    PATIENT NAME: Christal Amado                       :        1960  MED REC NO:   [id number]                            ROOM:       0333  ACCOUNT NO:   [de-identified]                           ADMIT DATE: 2022  PROVIDER:     Debbie Scott DO    DATE OF PROCEDURE:  2022    SPIROMETRY    REFERRING PROVIDER:  ILA London    HEIGHT:  66 inches. WEIGHT:  151 pounds. DIAGNOSIS:  Preop. TOBACCO USE:  The patient never smoked. SPIROMETRY:  FVC is 2.72 liters, which is 84% of predicted. FEV1 is 2.2  liters, which is 87% of predicted. FEV1/FVC ratio is 81. MVV is 100,  which is 107% of predicted. LUNG VOLUME:  SVC is 2.56 liters, which is 79% of predicted. ERV is  0.42 liters, which is 44% of predicted. DIFFUSION:  Diffusion is 20.04 mL/min of mercury which is 74% of  predicted. Corrected for alveolar ventilation is 98% of predicted. FLOW-VOLUME LOOP:  Flow-volume loop is essentially normal.    INTERPRETATION:  Overall spirometry shows no obstruction. Lung volumes  are not consistent with restriction. Diffusion is slightly reduced but  does correct for alveolar ventilation.         Aleksander Hall DO    D: 2022 12:13:05       T: 2022 14:41:46     [email or web address]  Job#: [id number]     Doc#: [id number]    CC:

## 2022-12-27 ENCOUNTER — HOSPITAL ENCOUNTER (OUTPATIENT)
Dept: CARDIAC REHAB | Age: 62
Setting detail: THERAPIES SERIES
Discharge: HOME | End: 2022-12-27

## 2022-12-30 ENCOUNTER — COMMUNITY OUTREACH (OUTPATIENT)
Dept: PRIMARY CARE CLINIC | Age: 62
End: 2022-12-30

## 2023-01-03 ENCOUNTER — HOSPITAL ENCOUNTER (OUTPATIENT)
Dept: CARDIAC REHAB | Age: 63
Setting detail: THERAPIES SERIES
Discharge: HOME | End: 2023-01-03

## 2023-01-05 ENCOUNTER — HOSPITAL ENCOUNTER (OUTPATIENT)
Dept: CARDIAC REHAB | Age: 63
Setting detail: THERAPIES SERIES
Discharge: HOME | End: 2023-01-05

## 2023-01-09 ENCOUNTER — TELEPHONE (OUTPATIENT)
Dept: CARDIOTHORACIC SURGERY | Age: 63
End: 2023-01-09

## 2023-01-09 ENCOUNTER — HOSPITAL ENCOUNTER (OUTPATIENT)
Dept: CARDIAC REHAB | Age: 63
Setting detail: THERAPIES SERIES
Discharge: HOME | End: 2023-01-09
Payer: MEDICARE

## 2023-01-11 ENCOUNTER — HOSPITAL ENCOUNTER (OUTPATIENT)
Dept: CARDIAC REHAB | Age: 63
Setting detail: THERAPIES SERIES
Discharge: HOME | End: 2023-01-11
Payer: MEDICARE

## 2023-01-13 ENCOUNTER — HOSPITAL ENCOUNTER (OUTPATIENT)
Dept: CARDIAC REHAB | Age: 63
Setting detail: THERAPIES SERIES
Discharge: HOME | End: 2023-01-13
Payer: MEDICARE

## 2023-01-13 PROCEDURE — 93798 PHYS/QHP OP CAR RHAB W/ECG: CPT

## 2023-01-16 NOTE — TELEPHONE ENCOUNTER
Endocrinology prescribed Jardiance and gave Radha samples. She said that you told her to call the office when she was ready for a prescription to be sent. Patient is asking for a refill to be sent to [facility] in [place].     Last Appointment:  12/21/2022  Future Appointments   Date Time Provider Department Center   1/17/2023  2:00 PM Tres Hamlin DO CARDIO SURG [facility]   3/20/2023  2:20 PM ILA Richardson - CNS BDM ENDO [facility]   3/30/2023  1:45 PM DO [facility]

## 2023-01-16 NOTE — PROGRESS NOTES
Subjective:      Patient ID: Eamon Sinclair is a 70 y.o. female. CC: CABg follow up    HPI  She presents for routine 4 week follow up s/p:  OPERATIONS PERFORMED:  1. Coronary artery bypass grafting x3 using left internal mammary  artery to left anterior descending artery, reverse saphenous vein graft  to the dominant obtuse marginal branch of circumflex, reverse saphenous  vein graft to the posterior descending artery. 2.  Left atrial appendage ligation using a 35-mm AtriCure appendage  clip. 3.  Lower extremity endoscopic vein harvest.  4.  Rigid sternal fixation with the Beatsy plating system. She did well postoperatively and went home on POD 3. She has not had any readmissions since discharge. She denies incision issues, sternal instability, fever or chills. Review of Systems   Constitutional:  Negative for chills and fever. All other systems reviewed and are negative. Objective:   Physical Exam  Constitutional:       General: She is not in acute distress. Cardiovascular:      Rate and Rhythm: Normal rate and regular rhythm. Pulmonary:      Effort: Pulmonary effort is normal. No respiratory distress. Comments: midsternal and chest tube incisions well healed without evidence of infection. Sternum stable. Abdominal:      General: Abdomen is flat. Tenderness: There is no guarding. Skin:     General: Skin is warm and dry. Comments: Endoscopic vein harvest incisions intact without evidence of infection     Neurological:      General: No focal deficit present. Mental Status: She is alert and oriented to person, place, and time. Psychiatric:         Mood and Affect: Mood normal.         Behavior: Behavior normal.       Assessment:      S/p CABG x 3; progressing well      Plan:      Remove sternal precautions on 1/23/23  Cardiac rehab referral  Continue follow up with PCP, Cardiology as scheduled. Encouraged to call office with any questions, concerns.   Otherwise no further follow up necessary from CTS standpoint.

## 2023-01-17 ENCOUNTER — OFFICE VISIT (OUTPATIENT)
Dept: CARDIOTHORACIC SURGERY | Age: 63
End: 2023-01-17

## 2023-01-17 VITALS
WEIGHT: 147 LBS | DIASTOLIC BLOOD PRESSURE: 85 MMHG | SYSTOLIC BLOOD PRESSURE: 149 MMHG | HEIGHT: 66 IN | HEART RATE: 83 BPM | BODY MASS INDEX: 23.63 KG/M2

## 2023-01-17 DIAGNOSIS — Z95.1 S/P CABG (CORONARY ARTERY BYPASS GRAFT): Primary | ICD-10-CM

## 2023-01-17 PROCEDURE — 99024 POSTOP FOLLOW-UP VISIT: CPT | Performed by: THORACIC SURGERY (CARDIOTHORACIC VASCULAR SURGERY)

## 2023-01-31 RX ORDER — CLOPIDOGREL BISULFATE 75 MG/1
75 TABLET ORAL DAILY
Qty: 90 TABLET | Refills: 1 | Status: SHIPPED | OUTPATIENT
Start: 2023-01-31

## 2023-01-31 RX ORDER — METOPROLOL SUCCINATE 25 MG/1
25 TABLET, EXTENDED RELEASE ORAL DAILY
Qty: 90 TABLET | Refills: 1 | Status: SHIPPED | OUTPATIENT
Start: 2023-01-31

## 2023-01-31 NOTE — TELEPHONE ENCOUNTER
Last Appointment:  12/21/2022  Future Appointments   Date Time Provider Marcos Cedeno   3/20/2023  2:20 PM ILA Bolden BDM [facility]   3/30/2023  1:45 PM DO WEST Saez [facility]

## 2023-03-14 RX ORDER — ATORVASTATIN CALCIUM 40 MG/1
80 TABLET, FILM COATED ORAL DAILY
Qty: 180 TABLET | Refills: 1 | Status: SHIPPED | OUTPATIENT
Start: 2023-03-14 | End: 2023-06-12

## 2023-03-14 NOTE — TELEPHONE ENCOUNTER
Last Appointment:  12/21/2022  Future Appointments   Date Time Provider Marcos Cedeno   4/13/2023  1:40 PM ILA De La Garza - CNS BDM [facility]   5/4/2023 11:15 AM DO WEST Saucedo [facility]          Patient wanted a refill but she wanted the 40mg because its free through her insurance. She would take 2 tablets once a day to make it 80mg.

## 2023-04-20 DIAGNOSIS — E55.9 VITAMIN D INSUFFICIENCY: ICD-10-CM

## 2023-04-20 DIAGNOSIS — E11.65 POORLY CONTROLLED TYPE 2 DIABETES MELLITUS (HCC): ICD-10-CM

## 2023-04-20 LAB
ALBUMIN SERPL-MCNC: 4.3 G/DL (ref 3.5–5.2)
ALP SERPL-CCNC: 96 U/L (ref 35–104)
ALT SERPL-CCNC: 13 U/L (ref 0–32)
ANION GAP SERPL CALCULATED.3IONS-SCNC: 16 MMOL/L (ref 7–16)
AST SERPL-CCNC: 18 U/L (ref 0–31)
BASOPHILS # BLD: 0.03 E9/L (ref 0–0.2)
BASOPHILS NFR BLD: 0.5 % (ref 0–2)
BILIRUB SERPL-MCNC: 0.5 MG/DL (ref 0–1.2)
BUN SERPL-MCNC: 13 MG/DL (ref 6–23)
CALCIUM SERPL-MCNC: 9.4 MG/DL (ref 8.6–10.2)
CHLORIDE SERPL-SCNC: 106 MMOL/L (ref 98–107)
CHOLESTEROL, TOTAL: 160 MG/DL (ref 0–199)
CO2 SERPL-SCNC: 21 MMOL/L (ref 22–29)
CREAT SERPL-MCNC: 0.6 MG/DL (ref 0.5–1)
EOSINOPHIL # BLD: 0.23 E9/L (ref 0.05–0.5)
EOSINOPHIL NFR BLD: 3.9 % (ref 0–6)
ERYTHROCYTE [DISTWIDTH] IN BLOOD BY AUTOMATED COUNT: 15.7 FL (ref 11.5–15)
GLUCOSE SERPL-MCNC: 156 MG/DL (ref 74–99)
HBA1C MFR BLD: 8.9 % (ref 4–5.6)
HCT VFR BLD AUTO: 45.4 % (ref 34–48)
HDLC SERPL-MCNC: 36 MG/DL
HGB BLD-MCNC: 13.2 G/DL (ref 11.5–15.5)
IMM GRANULOCYTES # BLD: 0.01 E9/L
IMM GRANULOCYTES NFR BLD: 0.2 % (ref 0–5)
LDLC SERPL CALC-MCNC: 91 MG/DL (ref 0–99)
LYMPHOCYTES # BLD: 2.18 E9/L (ref 1.5–4)
LYMPHOCYTES NFR BLD: 37.1 % (ref 20–42)
MCH RBC QN AUTO: 23.5 PG (ref 26–35)
MCHC RBC AUTO-ENTMCNC: 29.1 % (ref 32–34.5)
MCV RBC AUTO: 80.8 FL (ref 80–99.9)
MONOCYTES # BLD: 0.42 E9/L (ref 0.1–0.95)
MONOCYTES NFR BLD: 7.1 % (ref 2–12)
NEUTROPHILS # BLD: 3.01 E9/L (ref 1.8–7.3)
NEUTS SEG NFR BLD: 51.2 % (ref 43–80)
PLATELET # BLD AUTO: 224 E9/L (ref 130–450)
PMV BLD AUTO: 10.8 FL (ref 7–12)
POTASSIUM SERPL-SCNC: 4.5 MMOL/L (ref 3.5–5)
PROT SERPL-MCNC: 6.7 G/DL (ref 6.4–8.3)
RBC # BLD AUTO: 5.62 E12/L (ref 3.5–5.5)
SODIUM SERPL-SCNC: 143 MMOL/L (ref 132–146)
TRIGL SERPL-MCNC: 167 MG/DL (ref 0–149)
TSH SERPL-MCNC: 1.39 UIU/ML (ref 0.27–4.2)
VITAMIN D 25-HYDROXY: 55 NG/ML (ref 30–100)
VLDLC SERPL CALC-MCNC: 33 MG/DL
WBC # BLD: 5.9 E9/L (ref 4.5–11.5)

## 2023-04-24 LAB
CREAT UR-MCNC: 35 MG/DL (ref 29–226)
MICROALBUMIN UR-MCNC: <12 MG/L
MICROALBUMIN/CREAT UR-RTO: ABNORMAL (ref 0–30)

## 2023-05-01 ENCOUNTER — TELEPHONE (OUTPATIENT)
Dept: PHARMACY | Facility: CLINIC | Age: 63
End: 2023-05-01

## 2023-05-01 SDOH — ECONOMIC STABILITY: INCOME INSECURITY: HOW HARD IS IT FOR YOU TO PAY FOR THE VERY BASICS LIKE FOOD, HOUSING, MEDICAL CARE, AND HEATING?: NOT VERY HARD

## 2023-05-01 SDOH — HEALTH STABILITY: PHYSICAL HEALTH: ON AVERAGE, HOW MANY MINUTES DO YOU ENGAGE IN EXERCISE AT THIS LEVEL?: 30 MIN

## 2023-05-01 SDOH — ECONOMIC STABILITY: FOOD INSECURITY: WITHIN THE PAST 12 MONTHS, THE FOOD YOU BOUGHT JUST DIDN'T LAST AND YOU DIDN'T HAVE MONEY TO GET MORE.: NEVER TRUE

## 2023-05-01 SDOH — ECONOMIC STABILITY: TRANSPORTATION INSECURITY
IN THE PAST 12 MONTHS, HAS LACK OF TRANSPORTATION KEPT YOU FROM MEETINGS, WORK, OR FROM GETTING THINGS NEEDED FOR DAILY LIVING?: NO

## 2023-05-01 SDOH — HEALTH STABILITY: PHYSICAL HEALTH: ON AVERAGE, HOW MANY DAYS PER WEEK DO YOU ENGAGE IN MODERATE TO STRENUOUS EXERCISE (LIKE A BRISK WALK)?: 5 DAYS

## 2023-05-01 SDOH — ECONOMIC STABILITY: HOUSING INSECURITY
IN THE LAST 12 MONTHS, WAS THERE A TIME WHEN YOU DID NOT HAVE A STEADY PLACE TO SLEEP OR SLEPT IN A SHELTER (INCLUDING NOW)?: NO

## 2023-05-01 SDOH — ECONOMIC STABILITY: FOOD INSECURITY: WITHIN THE PAST 12 MONTHS, YOU WORRIED THAT YOUR FOOD WOULD RUN OUT BEFORE YOU GOT MONEY TO BUY MORE.: NEVER TRUE

## 2023-05-01 ASSESSMENT — PATIENT HEALTH QUESTIONNAIRE - PHQ9
2. FEELING DOWN, DEPRESSED OR HOPELESS: 0
1. LITTLE INTEREST OR PLEASURE IN DOING THINGS: 0
SUM OF ALL RESPONSES TO PHQ QUESTIONS 1-9: 0
SUM OF ALL RESPONSES TO PHQ9 QUESTIONS 1 & 2: 0
SUM OF ALL RESPONSES TO PHQ QUESTIONS 1-9: 0

## 2023-05-01 ASSESSMENT — LIFESTYLE VARIABLES
HOW OFTEN DO YOU HAVE A DRINK CONTAINING ALCOHOL: 1
HOW MANY STANDARD DRINKS CONTAINING ALCOHOL DO YOU HAVE ON A TYPICAL DAY: 0
HOW MANY STANDARD DRINKS CONTAINING ALCOHOL DO YOU HAVE ON A TYPICAL DAY: PATIENT DOES NOT DRINK
HOW OFTEN DO YOU HAVE A DRINK CONTAINING ALCOHOL: NEVER
HOW OFTEN DO YOU HAVE SIX OR MORE DRINKS ON ONE OCCASION: 1

## 2023-05-01 NOTE — TELEPHONE ENCOUNTER
[facility] CLINICAL PHARMACY: ADHERENCE REVIEW  Identified care gap per [place] fills with [address]: Diabetes and Statin adherence      Recent Visits  Date Type Provider Dept   12/21/22 Office Visit DO Sara [facility]   01/21/22 Office Visit DO Sara [facility]   Showing recent visits within past 540 days with a meds authorizing provider and meeting all other requirements  Future Appointments  Date Type Provider Dept   05/04/23 Appointment DO Sara Velasquez N [address]   Showing future appointments within next 150 days with a meds authorizing provider and meeting all other requirements    Patient also appears to be prescribed:No Others in the metric at this time  Per insurer report, LIS-0 - co-pays are based on tiers and patient is subject to coverage gap. ASSESSMENT    DIABETES ADHERENCE    Insurance Records claims through 04/24/2023 (Prior Year [place] = not reported; YTD [place] = 100%; Potential Fail Date: 09.25.23):   Jardiance last filled on 03.30.23 for 90 day supply. Next refill due: 06.28.23  Glipizide last filled on 01.08.23 for 90 day supply. Next refill due: 04.08.23    Per Insurer Portal: last filled on (same as above). Lab Results   Component Value Date    LABA1C 8.9 (H) 04/20/2023    LABA1C 12.1 (H) 12/08/2022    LABA1C 12.0 (H) 12/07/2022     NOTE: A1c <9%    [address] Records claims through 04/24/2023 (Prior Year PDC = not reported; YTD PDC = FIRST FILL; Potential Fail Date: 06.10.23): Atorvastatin last filled on 03.14.23 for 30 day supply. Next refill due: 04.13.23    Per Insurer Portal: last filled on (same as above).       Lab Results   Component Value Date    CHOL 160 04/20/2023    TRIG 167 (H) 04/20/2023    HDL 36 04/20/2023    LDLCALC 91 04/20/2023     ALT   Date Value Ref Range Status   04/20/2023 13 0 - 32 U/L Final     AST   Date Value Ref Range Status   04/20/2023 18 0 - 31 U/L Final     The ASCVD Risk score (Tasia

## 2023-05-04 ENCOUNTER — OFFICE VISIT (OUTPATIENT)
Dept: PRIMARY CARE CLINIC | Age: 63
End: 2023-05-04
Payer: MEDICARE

## 2023-05-04 ENCOUNTER — TELEPHONE (OUTPATIENT)
Dept: ADMINISTRATIVE | Age: 63
End: 2023-05-04

## 2023-05-04 VITALS
DIASTOLIC BLOOD PRESSURE: 90 MMHG | HEIGHT: 66 IN | SYSTOLIC BLOOD PRESSURE: 172 MMHG | BODY MASS INDEX: 24.34 KG/M2 | HEART RATE: 73 BPM | TEMPERATURE: 97.1 F | OXYGEN SATURATION: 100 % | WEIGHT: 151.44 LBS

## 2023-05-04 DIAGNOSIS — I10 BENIGN ESSENTIAL HYPERTENSION: ICD-10-CM

## 2023-05-04 DIAGNOSIS — I25.10 CORONARY ARTERY DISEASE INVOLVING NATIVE CORONARY ARTERY OF NATIVE HEART WITHOUT ANGINA PECTORIS: ICD-10-CM

## 2023-05-04 DIAGNOSIS — E78.2 MIXED HYPERLIPIDEMIA: ICD-10-CM

## 2023-05-04 DIAGNOSIS — I25.5 ISCHEMIC CARDIOMYOPATHY: ICD-10-CM

## 2023-05-04 DIAGNOSIS — Z95.1 S/P CABG (CORONARY ARTERY BYPASS GRAFT): ICD-10-CM

## 2023-05-04 DIAGNOSIS — Z00.00 WELCOME TO MEDICARE PREVENTIVE VISIT: Primary | ICD-10-CM

## 2023-05-04 DIAGNOSIS — E11.65 TYPE 2 DIABETES MELLITUS WITH HYPERGLYCEMIA, WITHOUT LONG-TERM CURRENT USE OF INSULIN (HCC): ICD-10-CM

## 2023-05-04 PROBLEM — I21.4 NSTEMI (NON-ST ELEVATED MYOCARDIAL INFARCTION) (HCC): Status: RESOLVED | Noted: 2022-12-06 | Resolved: 2023-05-04

## 2023-05-04 PROCEDURE — 3017F COLORECTAL CA SCREEN DOC REV: CPT | Performed by: FAMILY MEDICINE

## 2023-05-04 PROCEDURE — 3077F SYST BP >= 140 MM HG: CPT | Performed by: FAMILY MEDICINE

## 2023-05-04 PROCEDURE — 1123F ACP DISCUSS/DSCN MKR DOCD: CPT | Performed by: FAMILY MEDICINE

## 2023-05-04 PROCEDURE — 3080F DIAST BP >= 90 MM HG: CPT | Performed by: FAMILY MEDICINE

## 2023-05-04 PROCEDURE — G0402 INITIAL PREVENTIVE EXAM: HCPCS | Performed by: FAMILY MEDICINE

## 2023-05-04 PROCEDURE — 3052F HG A1C>EQUAL 8.0%<EQUAL 9.0%: CPT | Performed by: FAMILY MEDICINE

## 2023-05-04 RX ORDER — GLIPIZIDE 10 MG/1
10 TABLET ORAL
Qty: 180 TABLET | Refills: 1 | Status: SHIPPED | OUTPATIENT
Start: 2023-05-04 | End: 2023-08-02

## 2023-05-04 RX ORDER — ROSUVASTATIN CALCIUM 10 MG/1
10 TABLET, COATED ORAL
Qty: 90 TABLET | Refills: 1 | Status: SHIPPED | OUTPATIENT
Start: 2023-05-04 | End: 2023-08-02

## 2023-05-04 NOTE — PROGRESS NOTES
Medicare Annual Wellness Visit    Mack Beaver is here for Medicare AWV and Discuss Medications (Not doing well on Lipitor)    Assessment & Plan   Welcome to Medicare preventive visit  HRA reviewed and addressed. Due for multiple screenings. Will work on getting accomplished over the next several months. Labs all reviewed in detail. Type 2 diabetes mellitus with hyperglycemia, without long-term current use of insulin (HCC)  -     glipiZIDE (GLUCOTROL) 10 MG tablet; Take 1 tablet by mouth 2 times daily (before meals), Disp-180 tablet, R-1Normal  -     CBC with Auto Differential; Future  -     Comprehensive Metabolic Panel; Future  -     Hemoglobin A1C; Future  -     Lipid Panel; Future  -     TSH; Future  -     Urinalysis; Future  -     Microalbumin / Creatinine Urine Ratio; Future  Improved, but not at goal yet. Patient working on dietary changes and exercise first.     Benign essential hypertension  Poorly controlled today, but patient states it has been well controlled at cardiac rehab. Will monitor. Consider adding ACE/ARB back if still uncontrolled with home check. Mixed hyperlipidemia  -     rosuvastatin (CRESTOR) 10 MG tablet; Take 1 tablet by mouth nightly, Disp-90 tablet, R-1Normal  -     Lipid Panel; Future  Will try switching Lipitor to Crestor for less myopathy symptoms. Ischemic cardiomyopathy  -     [address] [place] [address], Cardiology, [place]  Needs a follow up with Cardiology, but would prefer to not go back to Dr. Romulo Archuleta     Coronary artery disease involving native coronary artery of native heart without angina pectoris  -     rosuvastatin (CRESTOR) 10 MG tablet; Take 1 tablet by mouth nightly, Disp-90 tablet, R-1Normal  -     Yolanda Perez, [address] [place] [address], Cardiology, [place]    S/P CABG (coronary artery bypass graft)  -     rosuvastatin (CRESTOR) 10 MG tablet;  Take 1 tablet by mouth nightly, Disp-90 tablet, R-1Normal  -     [address], Cardiology, [place]

## 2023-06-29 ENCOUNTER — OFFICE VISIT (OUTPATIENT)
Dept: ENDOCRINOLOGY | Age: 63
End: 2023-06-29
Payer: MEDICARE

## 2023-06-29 VITALS
BODY MASS INDEX: 24.11 KG/M2 | SYSTOLIC BLOOD PRESSURE: 153 MMHG | HEART RATE: 89 BPM | HEIGHT: 66 IN | DIASTOLIC BLOOD PRESSURE: 60 MMHG | OXYGEN SATURATION: 98 % | WEIGHT: 150 LBS

## 2023-06-29 DIAGNOSIS — Z91.119 DIETARY NONCOMPLIANCE: ICD-10-CM

## 2023-06-29 DIAGNOSIS — E11.65 UNCONTROLLED TYPE 2 DIABETES MELLITUS WITH HYPERGLYCEMIA (HCC): Primary | ICD-10-CM

## 2023-06-29 DIAGNOSIS — E11.59 TYPE 2 DIABETES MELLITUS WITH CARDIAC COMPLICATION (HCC): ICD-10-CM

## 2023-06-29 PROCEDURE — 3078F DIAST BP <80 MM HG: CPT | Performed by: CLINICAL NURSE SPECIALIST

## 2023-06-29 PROCEDURE — G8400 PT W/DXA NO RESULTS DOC: HCPCS | Performed by: CLINICAL NURSE SPECIALIST

## 2023-06-29 PROCEDURE — 99214 OFFICE O/P EST MOD 30 MIN: CPT | Performed by: CLINICAL NURSE SPECIALIST

## 2023-06-29 PROCEDURE — G8427 DOCREV CUR MEDS BY ELIG CLIN: HCPCS | Performed by: CLINICAL NURSE SPECIALIST

## 2023-06-29 PROCEDURE — 1036F TOBACCO NON-USER: CPT | Performed by: CLINICAL NURSE SPECIALIST

## 2023-06-29 PROCEDURE — 3077F SYST BP >= 140 MM HG: CPT | Performed by: CLINICAL NURSE SPECIALIST

## 2023-06-29 PROCEDURE — 3017F COLORECTAL CA SCREEN DOC REV: CPT | Performed by: CLINICAL NURSE SPECIALIST

## 2023-06-29 PROCEDURE — G8420 CALC BMI NORM PARAMETERS: HCPCS | Performed by: CLINICAL NURSE SPECIALIST

## 2023-06-29 PROCEDURE — 2022F DILAT RTA XM EVC RTNOPTHY: CPT | Performed by: CLINICAL NURSE SPECIALIST

## 2023-06-29 PROCEDURE — 1090F PRES/ABSN URINE INCON ASSESS: CPT | Performed by: CLINICAL NURSE SPECIALIST

## 2023-06-29 PROCEDURE — 1123F ACP DISCUSS/DSCN MKR DOCD: CPT | Performed by: CLINICAL NURSE SPECIALIST

## 2023-06-29 PROCEDURE — 3052F HG A1C>EQUAL 8.0%<EQUAL 9.0%: CPT | Performed by: CLINICAL NURSE SPECIALIST

## 2023-07-07 ENCOUNTER — OFFICE VISIT (OUTPATIENT)
Dept: CARDIOLOGY CLINIC | Age: 63
End: 2023-07-07
Payer: MEDICARE

## 2023-07-07 VITALS
DIASTOLIC BLOOD PRESSURE: 70 MMHG | BODY MASS INDEX: 23.54 KG/M2 | HEIGHT: 67 IN | HEART RATE: 69 BPM | WEIGHT: 150 LBS | SYSTOLIC BLOOD PRESSURE: 122 MMHG | RESPIRATION RATE: 16 BRPM

## 2023-07-07 DIAGNOSIS — I25.10 CORONARY ARTERY DISEASE INVOLVING NATIVE CORONARY ARTERY OF NATIVE HEART WITHOUT ANGINA PECTORIS: Primary | ICD-10-CM

## 2023-07-07 PROCEDURE — 1036F TOBACCO NON-USER: CPT | Performed by: INTERNAL MEDICINE

## 2023-07-07 PROCEDURE — G8420 CALC BMI NORM PARAMETERS: HCPCS | Performed by: INTERNAL MEDICINE

## 2023-07-07 PROCEDURE — G8427 DOCREV CUR MEDS BY ELIG CLIN: HCPCS | Performed by: INTERNAL MEDICINE

## 2023-07-07 PROCEDURE — 3017F COLORECTAL CA SCREEN DOC REV: CPT | Performed by: INTERNAL MEDICINE

## 2023-07-07 PROCEDURE — 1123F ACP DISCUSS/DSCN MKR DOCD: CPT | Performed by: INTERNAL MEDICINE

## 2023-07-07 PROCEDURE — 93000 ELECTROCARDIOGRAM COMPLETE: CPT | Performed by: INTERNAL MEDICINE

## 2023-07-07 PROCEDURE — 3078F DIAST BP <80 MM HG: CPT | Performed by: INTERNAL MEDICINE

## 2023-07-07 PROCEDURE — 1090F PRES/ABSN URINE INCON ASSESS: CPT | Performed by: INTERNAL MEDICINE

## 2023-07-07 PROCEDURE — 99204 OFFICE O/P NEW MOD 45 MIN: CPT | Performed by: INTERNAL MEDICINE

## 2023-07-07 PROCEDURE — 3074F SYST BP LT 130 MM HG: CPT | Performed by: INTERNAL MEDICINE

## 2023-07-07 PROCEDURE — G8400 PT W/DXA NO RESULTS DOC: HCPCS | Performed by: INTERNAL MEDICINE

## 2023-07-07 RX ORDER — LISINOPRIL 10 MG/1
10 TABLET ORAL DAILY
Qty: 90 TABLET | Refills: 3
Start: 2023-07-07

## 2023-07-24 RX ORDER — CLOPIDOGREL BISULFATE 75 MG/1
TABLET ORAL
Qty: 90 TABLET | Refills: 1 | Status: SHIPPED | OUTPATIENT
Start: 2023-07-24

## 2023-08-08 ENCOUNTER — OFFICE VISIT (OUTPATIENT)
Dept: PRIMARY CARE CLINIC | Age: 63
End: 2023-08-08
Payer: MEDICARE

## 2023-08-08 VITALS
HEIGHT: 67 IN | OXYGEN SATURATION: 98 % | BODY MASS INDEX: 23.7 KG/M2 | SYSTOLIC BLOOD PRESSURE: 124 MMHG | HEART RATE: 72 BPM | DIASTOLIC BLOOD PRESSURE: 72 MMHG | WEIGHT: 151 LBS | TEMPERATURE: 97.7 F

## 2023-08-08 DIAGNOSIS — I50.22 CHRONIC SYSTOLIC (CONGESTIVE) HEART FAILURE (HCC): ICD-10-CM

## 2023-08-08 DIAGNOSIS — I25.10 CORONARY ARTERY DISEASE INVOLVING NATIVE CORONARY ARTERY OF NATIVE HEART WITHOUT ANGINA PECTORIS: ICD-10-CM

## 2023-08-08 DIAGNOSIS — I25.5 ISCHEMIC CARDIOMYOPATHY: ICD-10-CM

## 2023-08-08 DIAGNOSIS — I10 BENIGN ESSENTIAL HYPERTENSION: ICD-10-CM

## 2023-08-08 DIAGNOSIS — E11.65 TYPE 2 DIABETES MELLITUS WITH HYPERGLYCEMIA, WITHOUT LONG-TERM CURRENT USE OF INSULIN (HCC): Primary | ICD-10-CM

## 2023-08-08 DIAGNOSIS — Z95.1 S/P CABG (CORONARY ARTERY BYPASS GRAFT): ICD-10-CM

## 2023-08-08 PROCEDURE — 99214 OFFICE O/P EST MOD 30 MIN: CPT | Performed by: FAMILY MEDICINE

## 2023-08-08 PROCEDURE — 3052F HG A1C>EQUAL 8.0%<EQUAL 9.0%: CPT | Performed by: FAMILY MEDICINE

## 2023-08-08 PROCEDURE — 1123F ACP DISCUSS/DSCN MKR DOCD: CPT | Performed by: FAMILY MEDICINE

## 2023-08-08 PROCEDURE — 3078F DIAST BP <80 MM HG: CPT | Performed by: FAMILY MEDICINE

## 2023-08-08 PROCEDURE — 3074F SYST BP LT 130 MM HG: CPT | Performed by: FAMILY MEDICINE

## 2023-08-08 ASSESSMENT — ENCOUNTER SYMPTOMS
WHEEZING: 0
SHORTNESS OF BREATH: 0
DIARRHEA: 0
COUGH: 0
ABDOMINAL PAIN: 0
VOMITING: 0
CONSTIPATION: 0
BACK PAIN: 0
NAUSEA: 0

## 2023-12-01 ENCOUNTER — TELEPHONE (OUTPATIENT)
Dept: PHARMACY | Facility: CLINIC | Age: 63
End: 2023-12-01

## 2023-12-01 DIAGNOSIS — E78.2 MIXED HYPERLIPIDEMIA: ICD-10-CM

## 2023-12-01 DIAGNOSIS — I25.10 CORONARY ARTERY DISEASE INVOLVING NATIVE CORONARY ARTERY OF NATIVE HEART WITHOUT ANGINA PECTORIS: ICD-10-CM

## 2023-12-01 DIAGNOSIS — Z95.1 S/P CABG (CORONARY ARTERY BYPASS GRAFT): ICD-10-CM

## 2023-12-01 RX ORDER — ROSUVASTATIN CALCIUM 10 MG/1
10 TABLET, COATED ORAL
Qty: 90 TABLET | Refills: 0 | Status: SHIPPED | OUTPATIENT
Start: 2023-12-01 | End: 2024-02-29

## 2023-12-01 NOTE — TELEPHONE ENCOUNTER
[facility] CLINICAL PHARMACY: ADHERENCE REVIEW  Identified care gap per [employer]: fills [facility] : Statin adherence    Patient also appears to be prescribed: ACE/ARB and Diabetes (passed both adherence measures)    ASSESSMENT   [employer] Records claims through 2023 (Prior Year [address] = not reported; YTD [address] = 81%; Potential Fail Date: 23): Rosuvastatin 10mg last filled on 23 for 90 day supply. Next refill due: 10/22/23    Prescribed si tablet/capsule daily    Per Insurer Portal:Same as above. Per [address]: will get 90 day supply ready to  since past due. Lab Results   Component Value Date    CHOL 160 2023    TRIG 167 (H) 2023    HDL 36 2023    LDLCALC 91 2023     ALT   Date Value Ref Range Status   2023 13 0 - 32 U/L Final     AST   Date Value Ref Range Status   2023 18 0 - 31 U/L Final     The ASCVD Risk score (Tasia DK, et al., 2019) failed to calculate for the following reasons: The patient has a prior MI or stroke diagnosis     PLAN  Per insurer report, LIS-0 - co-pays are based on tiers and patient is subject to coverage gap. The following are interventions that have been identified:   Patient overdue refilling Rosuvastatin 10mg and active on home medication list.   Refill/s of Rosuvastatin 10mg READY to  at patient's [address]    Attempting to reach patient to review. Left message asking for return call. And  refill    Last Visit: 23  Next Visit: 24    Radha Cedeno CPhT.    [facility] free: [phone]     For Pharmacy Admin Tracking Only    Program: Torres in place:  No  Recommendation Provided To: Pharmacy: 1  Intervention Detail: Refill(s) Provided  Intervention Accepted By: Pharmacy: 1  Gap Closed?: Yes   Time Spent (min): 15

## 2024-02-08 ENCOUNTER — OFFICE VISIT (OUTPATIENT)
Dept: PRIMARY CARE CLINIC | Age: 64
End: 2024-02-08
Payer: MEDICARE

## 2024-02-08 VITALS
DIASTOLIC BLOOD PRESSURE: 74 MMHG | BODY MASS INDEX: 23.96 KG/M2 | WEIGHT: 153 LBS | OXYGEN SATURATION: 95 % | TEMPERATURE: 97.3 F | HEART RATE: 66 BPM | SYSTOLIC BLOOD PRESSURE: 126 MMHG

## 2024-02-08 DIAGNOSIS — M25.511 CHRONIC PAIN OF BOTH SHOULDERS: ICD-10-CM

## 2024-02-08 DIAGNOSIS — G89.29 CHRONIC PAIN OF BOTH SHOULDERS: ICD-10-CM

## 2024-02-08 DIAGNOSIS — E11.59 TYPE 2 DIABETES MELLITUS WITH CARDIAC COMPLICATION (HCC): Primary | ICD-10-CM

## 2024-02-08 DIAGNOSIS — I50.22 CHRONIC SYSTOLIC (CONGESTIVE) HEART FAILURE (HCC): ICD-10-CM

## 2024-02-08 DIAGNOSIS — Z95.1 S/P CABG (CORONARY ARTERY BYPASS GRAFT): ICD-10-CM

## 2024-02-08 DIAGNOSIS — I25.10 CORONARY ARTERY DISEASE INVOLVING NATIVE CORONARY ARTERY OF NATIVE HEART WITHOUT ANGINA PECTORIS: ICD-10-CM

## 2024-02-08 DIAGNOSIS — M25.512 CHRONIC PAIN OF BOTH SHOULDERS: ICD-10-CM

## 2024-02-08 DIAGNOSIS — E78.2 MIXED HYPERLIPIDEMIA: ICD-10-CM

## 2024-02-08 PROCEDURE — 3078F DIAST BP <80 MM HG: CPT | Performed by: FAMILY MEDICINE

## 2024-02-08 PROCEDURE — 1123F ACP DISCUSS/DSCN MKR DOCD: CPT | Performed by: FAMILY MEDICINE

## 2024-02-08 PROCEDURE — 3074F SYST BP LT 130 MM HG: CPT | Performed by: FAMILY MEDICINE

## 2024-02-08 PROCEDURE — 99214 OFFICE O/P EST MOD 30 MIN: CPT | Performed by: FAMILY MEDICINE

## 2024-02-08 RX ORDER — METOPROLOL SUCCINATE 25 MG/1
25 TABLET, EXTENDED RELEASE ORAL DAILY
Qty: 90 TABLET | Refills: 1 | Status: SHIPPED | OUTPATIENT
Start: 2024-02-08

## 2024-02-08 RX ORDER — CLOPIDOGREL BISULFATE 75 MG/1
75 TABLET ORAL DAILY
Qty: 90 TABLET | Refills: 1 | Status: SHIPPED | OUTPATIENT
Start: 2024-02-08

## 2024-02-08 RX ORDER — LISINOPRIL 10 MG/1
10 TABLET ORAL DAILY
Qty: 90 TABLET | Refills: 1 | Status: SHIPPED | OUTPATIENT
Start: 2024-02-08

## 2024-02-08 RX ORDER — AMOXICILLIN 500 MG/1
2000 CAPSULE ORAL ONCE
Qty: 4 CAPSULE | Refills: 0 | Status: SHIPPED | OUTPATIENT
Start: 2024-02-08 | End: 2024-02-08

## 2024-02-08 RX ORDER — GLIPIZIDE 10 MG/1
10 TABLET ORAL
Qty: 180 TABLET | Refills: 1 | Status: SHIPPED | OUTPATIENT
Start: 2024-02-08 | End: 2024-05-08

## 2024-02-08 RX ORDER — ROSUVASTATIN CALCIUM 10 MG/1
10 TABLET, COATED ORAL
Qty: 24 TABLET | Refills: 1 | Status: SHIPPED | OUTPATIENT
Start: 2024-02-08 | End: 2024-05-08

## 2024-02-08 ASSESSMENT — ENCOUNTER SYMPTOMS
VOMITING: 0
BACK PAIN: 0
NAUSEA: 0
CONSTIPATION: 0
WHEEZING: 0
DIARRHEA: 0
SHORTNESS OF BREATH: 0
COUGH: 0
ABDOMINAL PAIN: 0

## 2024-02-08 ASSESSMENT — PATIENT HEALTH QUESTIONNAIRE - PHQ9
SUM OF ALL RESPONSES TO PHQ QUESTIONS 1-9: 0
2. FEELING DOWN, DEPRESSED OR HOPELESS: 0
SUM OF ALL RESPONSES TO PHQ QUESTIONS 1-9: 0
SUM OF ALL RESPONSES TO PHQ9 QUESTIONS 1 & 2: 0

## 2024-02-08 NOTE — PROGRESS NOTES
extended release tablet; Take 1 tablet by mouth daily  -     rosuvastatin (CRESTOR) 10 MG tablet; Take 1 tablet by mouth Twice a Week Twice weekly  Stable.  Starting to increase activity.     S/P CABG (coronary artery bypass graft)  -     clopidogrel (PLAVIX) 75 MG tablet; Take 1 tablet by mouth daily  -     rosuvastatin (CRESTOR) 10 MG tablet; Take 1 tablet by mouth Twice a Week Twice weekly  -     amoxicillin (AMOXIL) 500 MG capsule; Take 4 capsules by mouth once for 1 dose    Chronic systolic (congestive) heart failure (HCC)  -     lisinopril (PRINIVIL;ZESTRIL) 10 MG tablet; Take 1 tablet by mouth daily  -     metoprolol succinate (TOPROL XL) 25 MG extended release tablet; Take 1 tablet by mouth daily    Chronic pain of both shoulders  -     NAVEED; Future  -     Sedimentation Rate; Future          Return in about 6 months (around 8/8/2024), or if symptoms worsen or fail to improve, for AWV.      Seen By:  Cleo Louis, DO

## 2024-02-11 RX ORDER — EMPAGLIFLOZIN 10 MG/1
10 TABLET, FILM COATED ORAL DAILY
Qty: 30 TABLET | Refills: 5 | OUTPATIENT
Start: 2024-02-11

## 2024-02-12 NOTE — TELEPHONE ENCOUNTER
Pharmacist at [facility] calling for a new script for Jardiance to be sent for Radha.      She was just seen on Feb 8th.  I have this ready to send.       Last Appointment:  2/8/2024  No future appointments.

## 2024-02-21 ENCOUNTER — TELEPHONE (OUTPATIENT)
Dept: PRIMARY CARE CLINIC | Age: 64
End: 2024-02-21

## 2024-02-21 NOTE — TELEPHONE ENCOUNTER
VIRGILIO for [facility] prescription assistance to call back - wanting to get patient assistance with jardiance

## 2024-07-02 ENCOUNTER — TELEPHONE (OUTPATIENT)
Dept: PHARMACY | Facility: CLINIC | Age: 64
End: 2024-07-02

## 2024-07-02 NOTE — TELEPHONE ENCOUNTER
[facility] CLINICAL PHARMACY REVIEW: ADHERENCE  Identified care gap per [employer]: fills at [facility] : Statin adherence    Patient also appears to be prescribed: ACE/ARB and Diabetes    ASSESSMENT  ACE/ARB ADHERENCE    Insurance Records claims through 07/02/24 (Prior Year PDC = not reported; YTD PDC = 100%; Potential Fail Date: 10/10/2024):   lisinopril 10mg daily last filled on 04/24/2024 for 90 day supply. Next refill due: 07/23/2024    Last Rx sent on 02/08/2024 for 90ds with 1 refill - 0 refills remain    BP Readings from Last 3 Encounters:   02/08/24 126/74   08/08/23 124/72   07/07/23 122/70     Lab Results   Component Value Date/Time    LABGLOM >60 04/20/2023 08:22 AM     Lab Results   Component Value Date    CREATININE 0.6 04/20/2023     CrCl cannot be calculated (Patient's most recent lab result is older than the maximum 180 days allowed.).    Lab Results   Component Value Date    K 4.5 04/20/2023      DIABETES ADHERENCE    Insurance Records claims through 07/02/24 (Prior Year PDC = 98% - PASSED; YTD PDC = 96%; Potential Fail Date: 10/28/2024):   glipizide 10mg BID last filled on 04/24/2024 for 90 day supply. Next refill due: 07/23/2024  Jardiance (empagliflozin) 10mg daily last filled on 05/03/2024 for 90 day supply. Next refill due: 08/01/2024    Last glipizide Rx sent on 02/08/2024 for 90ds with 1 refill - 0 refills remaining  Last Jardiance Rx sent on 02/23/2024 for 90ds with 3 refills - 2 refills remaining    Lab Results   Component Value Date    LABA1C 8.9 (H) 04/20/2023    LABA1C 12.1 (H) 12/08/2022    LABA1C 12.0 (H) 12/07/2022     NOTE A1c not yet completed this calendar year     STATIN ADHERENCE    Insurance Records claims through 07/02/24 (Prior Year PDC = 71% - FAILED; YTD PDC = FIRST FILL; Potential Fail Date: 07/07/2024):   rosuvastatin 10mg twice weekly last filled on 02/08/2024 for 84 day supply. Next refill due: 05/03/2024    Last Rx sent on 02/08/2024 for 84ds with 1 refill    Per

## 2024-08-02 DIAGNOSIS — I25.10 CORONARY ARTERY DISEASE INVOLVING NATIVE CORONARY ARTERY OF NATIVE HEART WITHOUT ANGINA PECTORIS: ICD-10-CM

## 2024-08-02 DIAGNOSIS — Z95.1 S/P CABG (CORONARY ARTERY BYPASS GRAFT): ICD-10-CM

## 2024-08-02 RX ORDER — CLOPIDOGREL BISULFATE 75 MG/1
75 TABLET ORAL DAILY
Qty: 90 TABLET | Refills: 0 | Status: SHIPPED | OUTPATIENT
Start: 2024-08-02

## 2024-10-09 ENCOUNTER — TELEPHONE (OUTPATIENT)
Dept: PHARMACY | Facility: CLINIC | Age: 64
End: 2024-10-09

## 2024-10-09 DIAGNOSIS — E11.59 TYPE 2 DIABETES MELLITUS WITH CARDIAC COMPLICATION (HCC): ICD-10-CM

## 2024-10-09 DIAGNOSIS — I25.10 CORONARY ARTERY DISEASE INVOLVING NATIVE CORONARY ARTERY OF NATIVE HEART WITHOUT ANGINA PECTORIS: ICD-10-CM

## 2024-10-09 DIAGNOSIS — I50.22 CHRONIC SYSTOLIC (CONGESTIVE) HEART FAILURE (HCC): ICD-10-CM

## 2024-10-09 NOTE — TELEPHONE ENCOUNTER
[facility] CLINICAL PHARMACY: ADHERENCE REVIEW  Identified care gap per [employer]: fills at [facility] : ACE/ARB, Diabetes, and Statin adherence    Patient also appears to be prescribed: ACE/ARB, Diabetes, and Statin    ASSESSMENT    ACE/ARB ADHERENCE    Insurance Records claims through 10/07/2024 (Prior Year PDC = not reported; YTD PDC = 75%; Potential Fail Date: 10/09/24):   LISINOPRIL TAB 10MG last filled on 24 for 90 day supply. Next refill due:     Prescribed si tablet/capsule daily    Per Reconcile Dispense History: last filled on 24 for 90 day supply.     Per [facility]:  0 refills remaining.  Pharmacy will send a refill request     BP Readings from Last 3 Encounters:   24 126/74   23 124/72   23 122/70     CrCl cannot be calculated (Patient's most recent lab result is older than the maximum 180 days allowed.).  Lab Results   Component Value Date    CREATININE 0.6 2023     Lab Results   Component Value Date    K 4.5 2023     DIABETES ADHERENCE    Insurance Records claims through 10/07/2024 (Prior Year PDC = not reported; YTD PDC = 81%; Potential Fail Date: 10/27/24):   GLIPIZIDE TAB 10MG last filled on 24 for 90 day supply. Next refill due: 24    Prescribed si tablet/capsule twice daily    Per Reconcile Dispense History: last filled on 24 for 90 day supply.     Per [facility]:  0 refills remaining.  Pharmacy will send a refill request     Insurance Records claims through 10/07/2024 (Prior Year PDC = not reported; YTD PDC = 81%; Potential Fail Date: 10/27/24):   JARDIANCE TAB 10MG last filled on 24 for 90 day supply. Next refill due: 24    Prescribed si tablet/capsule daily    Per Reconcile Dispense History: last filled on 24 for 90 day supply.     Per [facility]: will get  90 day supply ready to  since past due.    Lab Results   Component Value Date    LABA1C 8.9 (H) 2023    LABA1C

## 2024-10-10 RX ORDER — GLIPIZIDE 10 MG/1
TABLET ORAL
Qty: 180 TABLET | Refills: 0 | Status: SHIPPED | OUTPATIENT
Start: 2024-10-10

## 2024-10-10 RX ORDER — LISINOPRIL 10 MG/1
10 TABLET ORAL DAILY
Qty: 90 TABLET | Refills: 0 | Status: SHIPPED | OUTPATIENT
Start: 2024-10-10

## 2024-10-29 DIAGNOSIS — Z95.1 S/P CABG (CORONARY ARTERY BYPASS GRAFT): ICD-10-CM

## 2024-10-29 DIAGNOSIS — I25.10 CORONARY ARTERY DISEASE INVOLVING NATIVE CORONARY ARTERY OF NATIVE HEART WITHOUT ANGINA PECTORIS: ICD-10-CM

## 2024-10-30 RX ORDER — CLOPIDOGREL BISULFATE 75 MG/1
75 TABLET ORAL DAILY
Qty: 90 TABLET | Refills: 0 | Status: SHIPPED | OUTPATIENT
Start: 2024-10-30

## 2024-10-31 ENCOUNTER — TELEPHONE (OUTPATIENT)
Dept: FAMILY MEDICINE CLINIC | Age: 64
End: 2024-10-31

## 2024-10-31 NOTE — TELEPHONE ENCOUNTER
You can use a RED slot during the week she is requesting.  She is largely overdue for labs.  She must have labs.

## 2024-10-31 NOTE — TELEPHONE ENCOUNTER
----- Message from Edinson ROBERTS sent at 10/31/2024  9:02 AM EDT -----  Regarding: E[facility] Appointment Request  ECC Appointment Request    Patient needs appointment for ECC Appointment Type: Existing Condition Follow Up.    Patient Requested Dates(s): 3rd week of November probably within the week 11/18/24 - 11/22/24   Patient Requested Time: anytime  Provider Name: Cleo Louis DO    Reason for Appointment Request: Established Patient - Available appointments did not meet patient need    Additional Information : Patient wanted to see her doctor for her Medication Refill and it depends if her doctor wanted to see her in person or through video visit.   --------------------------------------------------------------------------------------------------------------------------    Relationship to Patient: Self     Call Back Information: OK to leave message on voicemail  Preferred Call Back Number: Phone [phone]

## 2024-10-31 NOTE — TELEPHONE ENCOUNTER
Spoke with Radha; scheduled appt with you on Friday 11/22 at 4:15 PM.     Did advise her that there were labs in her chart that needed to be completed before her appointment. She is agreeable to that.

## 2024-11-08 DIAGNOSIS — G89.29 CHRONIC PAIN OF BOTH SHOULDERS: ICD-10-CM

## 2024-11-08 DIAGNOSIS — E78.2 MIXED HYPERLIPIDEMIA: ICD-10-CM

## 2024-11-08 DIAGNOSIS — M25.512 CHRONIC PAIN OF BOTH SHOULDERS: ICD-10-CM

## 2024-11-08 DIAGNOSIS — E11.65 TYPE 2 DIABETES MELLITUS WITH HYPERGLYCEMIA, WITHOUT LONG-TERM CURRENT USE OF INSULIN (HCC): ICD-10-CM

## 2024-11-08 DIAGNOSIS — M25.511 CHRONIC PAIN OF BOTH SHOULDERS: ICD-10-CM

## 2024-11-08 LAB
BASOPHILS ABSOLUTE: 0.05 K/UL (ref 0–0.2)
BASOPHILS RELATIVE PERCENT: 1 % (ref 0–2)
BILIRUBIN, URINE: NEGATIVE
COLOR, UA: YELLOW
COMMENT: ABNORMAL
CREATININE URINE: 84.3 MG/DL (ref 29–226)
EOSINOPHILS ABSOLUTE: 0.1 K/UL (ref 0.05–0.5)
EOSINOPHILS RELATIVE PERCENT: 2 % (ref 0–6)
GLUCOSE URINE: >=1000 MG/DL
HBA1C MFR BLD: 8.5 % (ref 4–5.6)
HCT VFR BLD CALC: 48.9 % (ref 34–48)
HEMOGLOBIN: 15.1 G/DL (ref 11.5–15.5)
IMMATURE GRANULOCYTES %: 0 % (ref 0–5)
IMMATURE GRANULOCYTES ABSOLUTE: <0.03 K/UL (ref 0–0.58)
KETONES, URINE: NEGATIVE MG/DL
LEUKOCYTE ESTERASE, URINE: NEGATIVE
LYMPHOCYTES ABSOLUTE: 1.76 K/UL (ref 1.5–4)
LYMPHOCYTES RELATIVE PERCENT: 34 % (ref 20–42)
MCH RBC QN AUTO: 27.5 PG (ref 26–35)
MCHC RBC AUTO-ENTMCNC: 30.9 G/DL (ref 32–34.5)
MCV RBC AUTO: 89.1 FL (ref 80–99.9)
MICROALBUMIN/CREAT 24H UR: <12 MG/L (ref 0–19)
MICROALBUMIN/CREAT UR-RTO: NORMAL MCG/MG CREAT (ref 0–30)
MONOCYTES ABSOLUTE: 0.47 K/UL (ref 0.1–0.95)
MONOCYTES RELATIVE PERCENT: 9 % (ref 2–12)
NEUTROPHILS ABSOLUTE: 2.75 K/UL (ref 1.8–7.3)
NEUTROPHILS RELATIVE PERCENT: 54 % (ref 43–80)
NITRITE, URINE: NEGATIVE
PDW BLD-RTO: 13.6 % (ref 11.5–15)
PH, URINE: 5.5 (ref 5–9)
PLATELET # BLD: 201 K/UL (ref 130–450)
PMV BLD AUTO: 11 FL (ref 7–12)
PROTEIN UA: NEGATIVE MG/DL
RBC # BLD: 5.49 M/UL (ref 3.5–5.5)
SED RATE, AUTOMATED: 5 MM/HR (ref 0–20)
SPECIFIC GRAVITY UA: 1.02 (ref 1–1.03)
TURBIDITY: CLEAR
URINE HGB: NEGATIVE
UROBILINOGEN, URINE: 0.2 EU/DL (ref 0–1)
WBC # BLD: 5.1 K/UL (ref 4.5–11.5)

## 2024-11-09 LAB
ALBUMIN: 4.6 G/DL (ref 3.5–5.2)
ALP BLD-CCNC: 92 U/L (ref 35–104)
ALT SERPL-CCNC: 13 U/L (ref 0–32)
ANION GAP SERPL CALCULATED.3IONS-SCNC: 13 MMOL/L (ref 7–16)
AST SERPL-CCNC: 15 U/L (ref 0–31)
BILIRUB SERPL-MCNC: 0.6 MG/DL (ref 0–1.2)
BUN BLDV-MCNC: 13 MG/DL (ref 6–23)
CALCIUM SERPL-MCNC: 9.6 MG/DL (ref 8.6–10.2)
CHLORIDE BLD-SCNC: 101 MMOL/L (ref 98–107)
CHOLESTEROL, TOTAL: 211 MG/DL
CO2: 23 MMOL/L (ref 22–29)
CREAT SERPL-MCNC: 0.7 MG/DL (ref 0.5–1)
GFR, ESTIMATED: >90 ML/MIN/1.73M2
GLUCOSE BLD-MCNC: 143 MG/DL (ref 74–99)
HDLC SERPL-MCNC: 37 MG/DL
LDL CHOLESTEROL: 128 MG/DL
POTASSIUM SERPL-SCNC: 4.5 MMOL/L (ref 3.5–5)
SODIUM BLD-SCNC: 137 MMOL/L (ref 132–146)
TOTAL PROTEIN: 7.1 G/DL (ref 6.4–8.3)
TRIGL SERPL-MCNC: 231 MG/DL
TSH SERPL DL<=0.05 MIU/L-ACNC: 1.01 UIU/ML (ref 0.27–4.2)
VLDLC SERPL CALC-MCNC: 46 MG/DL

## 2024-11-11 LAB — ANTI-NUCLEAR ANTIBODY (ANA): NEGATIVE

## 2024-11-19 SDOH — ECONOMIC STABILITY: FOOD INSECURITY: WITHIN THE PAST 12 MONTHS, THE FOOD YOU BOUGHT JUST DIDN'T LAST AND YOU DIDN'T HAVE MONEY TO GET MORE.: PATIENT DECLINED

## 2024-11-19 SDOH — ECONOMIC STABILITY: TRANSPORTATION INSECURITY
IN THE PAST 12 MONTHS, HAS LACK OF TRANSPORTATION KEPT YOU FROM MEETINGS, WORK, OR FROM GETTING THINGS NEEDED FOR DAILY LIVING?: PATIENT DECLINED

## 2024-11-19 SDOH — ECONOMIC STABILITY: INCOME INSECURITY: HOW HARD IS IT FOR YOU TO PAY FOR THE VERY BASICS LIKE FOOD, HOUSING, MEDICAL CARE, AND HEATING?: PATIENT DECLINED

## 2024-11-19 SDOH — ECONOMIC STABILITY: FOOD INSECURITY: WITHIN THE PAST 12 MONTHS, YOU WORRIED THAT YOUR FOOD WOULD RUN OUT BEFORE YOU GOT MONEY TO BUY MORE.: PATIENT DECLINED

## 2024-11-22 ENCOUNTER — OFFICE VISIT (OUTPATIENT)
Dept: FAMILY MEDICINE CLINIC | Age: 64
End: 2024-11-22

## 2024-11-22 VITALS
SYSTOLIC BLOOD PRESSURE: 122 MMHG | RESPIRATION RATE: 16 BRPM | TEMPERATURE: 98.5 F | BODY MASS INDEX: 25.23 KG/M2 | HEART RATE: 79 BPM | DIASTOLIC BLOOD PRESSURE: 84 MMHG | HEIGHT: 66 IN | OXYGEN SATURATION: 99 % | WEIGHT: 157 LBS

## 2024-11-22 DIAGNOSIS — E11.59 TYPE 2 DIABETES MELLITUS WITH CARDIAC COMPLICATION (HCC): Primary | ICD-10-CM

## 2024-11-22 DIAGNOSIS — Z95.5 HISTORY OF RIGHT CORONARY ARTERY STENT PLACEMENT: ICD-10-CM

## 2024-11-22 DIAGNOSIS — Z95.1 S/P CABG (CORONARY ARTERY BYPASS GRAFT): ICD-10-CM

## 2024-11-22 DIAGNOSIS — E55.9 VITAMIN D INSUFFICIENCY: ICD-10-CM

## 2024-11-22 DIAGNOSIS — I10 BENIGN ESSENTIAL HYPERTENSION: ICD-10-CM

## 2024-11-22 DIAGNOSIS — I25.10 CORONARY ARTERY DISEASE INVOLVING NATIVE CORONARY ARTERY OF NATIVE HEART WITHOUT ANGINA PECTORIS: ICD-10-CM

## 2024-11-22 DIAGNOSIS — I50.22 CHRONIC SYSTOLIC (CONGESTIVE) HEART FAILURE (HCC): ICD-10-CM

## 2024-11-22 RX ORDER — AMOXICILLIN 500 MG/1
2000 CAPSULE ORAL ONCE
Qty: 4 CAPSULE | Refills: 0 | Status: SHIPPED | OUTPATIENT
Start: 2024-11-22 | End: 2024-11-22

## 2024-11-22 RX ORDER — METOPROLOL SUCCINATE 25 MG/1
25 TABLET, EXTENDED RELEASE ORAL DAILY
Qty: 90 TABLET | Refills: 1 | Status: SHIPPED | OUTPATIENT
Start: 2024-11-22

## 2024-11-22 RX ORDER — LISINOPRIL 10 MG/1
10 TABLET ORAL DAILY
Qty: 90 TABLET | Refills: 1 | Status: SHIPPED | OUTPATIENT
Start: 2024-11-22

## 2024-11-22 RX ORDER — GLIPIZIDE 10 MG/1
10 TABLET ORAL
Qty: 180 TABLET | Refills: 1 | Status: SHIPPED | OUTPATIENT
Start: 2024-11-22

## 2024-11-22 RX ORDER — CLOPIDOGREL BISULFATE 75 MG/1
75 TABLET ORAL DAILY
Qty: 90 TABLET | Refills: 1 | Status: SHIPPED | OUTPATIENT
Start: 2024-11-22

## 2024-11-22 ASSESSMENT — ENCOUNTER SYMPTOMS
DIARRHEA: 0
NAUSEA: 0
BACK PAIN: 0
VOMITING: 0
WHEEZING: 0
ABDOMINAL PAIN: 0
SHORTNESS OF BREATH: 0
COUGH: 0
CONSTIPATION: 0

## 2024-11-22 NOTE — ASSESSMENT & PLAN NOTE
Chronic, at goal (stable), continue current treatment plan    Orders:    clopidogrel (PLAVIX) 75 MG tablet; Take 1 tablet by mouth daily    lisinopril (PRINIVIL;ZESTRIL) 10 MG tablet; Take 1 tablet by mouth daily    metoprolol succinate (TOPROL XL) 25 MG extended release tablet; Take 1 tablet by mouth daily    amoxicillin (AMOXIL) 500 MG capsule; Take 4 capsules by mouth once for 1 dose

## 2024-11-22 NOTE — ASSESSMENT & PLAN NOTE
Improved control, but not at goal.  She has been splitting her Jardiance in half to save on cost, so not even getting full dose.  Will try to get her set up through patient assistance to help cover Jardiance.      Orders:    glipiZIDE (GLUCOTROL) 10 MG tablet; Take 1 tablet by mouth 2 times daily (before meals)    CBC with Auto Differential; Future    Comprehensive Metabolic Panel; Future    Hemoglobin A1C; Future    Lipid Panel; Future    TSH; Future    Urinalysis; Future    Microalbumin, Ur; Future

## 2024-11-22 NOTE — ASSESSMENT & PLAN NOTE
Due for dental cleaning.  Needs Amoxil.     Orders:    amoxicillin (AMOXIL) 500 MG capsule; Take 4 capsules by mouth once for 1 dose

## 2024-11-22 NOTE — PROGRESS NOTES
daily (before meals)    CBC with Auto Differential; Future    Comprehensive Metabolic Panel; Future    Hemoglobin A1C; Future    Lipid Panel; Future    TSH; Future    Urinalysis; Future    Microalbumin, Ur; Future    Coronary artery disease involving native coronary artery of native heart without angina pectoris   Chronic, at goal (stable), continue current treatment plan    Orders:    clopidogrel (PLAVIX) 75 MG tablet; Take 1 tablet by mouth daily    lisinopril (PRINIVIL;ZESTRIL) 10 MG tablet; Take 1 tablet by mouth daily    metoprolol succinate (TOPROL XL) 25 MG extended release tablet; Take 1 tablet by mouth daily    amoxicillin (AMOXIL) 500 MG capsule; Take 4 capsules by mouth once for 1 dose    History of right coronary artery stent placement    Due for dental cleaning.  Needs Amoxil.     Orders:    amoxicillin (AMOXIL) 500 MG capsule; Take 4 capsules by mouth once for 1 dose    S/P CABG (coronary artery bypass graft)       Orders:    clopidogrel (PLAVIX) 75 MG tablet; Take 1 tablet by mouth daily    Chronic systolic (congestive) heart failure (HCC)       Orders:    lisinopril (PRINIVIL;ZESTRIL) 10 MG tablet; Take 1 tablet by mouth daily    metoprolol succinate (TOPROL XL) 25 MG extended release tablet; Take 1 tablet by mouth daily    Benign essential hypertension   Chronic, at goal (stable), continue current treatment plan         Vitamin D insufficiency       Orders:    Vitamin D 25 Hydroxy; Future           Return in about 6 months (around 5/22/2025), or if symptoms worsen or fail to improve, for AWV.      Seen By:  Cleo Louis DO

## 2024-11-22 NOTE — ASSESSMENT & PLAN NOTE
Orders:    lisinopril (PRINIVIL;ZESTRIL) 10 MG tablet; Take 1 tablet by mouth daily    metoprolol succinate (TOPROL XL) 25 MG extended release tablet; Take 1 tablet by mouth daily

## 2025-05-09 RX ORDER — EMPAGLIFLOZIN 10 MG/1
10 TABLET, FILM COATED ORAL DAILY
Qty: 90 TABLET | Refills: 1 | Status: SHIPPED | OUTPATIENT
Start: 2025-05-09

## 2025-05-13 ENCOUNTER — OFFICE VISIT (OUTPATIENT)
Dept: FAMILY MEDICINE CLINIC | Age: 65
End: 2025-05-13

## 2025-05-13 VITALS
DIASTOLIC BLOOD PRESSURE: 84 MMHG | WEIGHT: 154 LBS | TEMPERATURE: 97.9 F | OXYGEN SATURATION: 96 % | HEIGHT: 66 IN | SYSTOLIC BLOOD PRESSURE: 132 MMHG | HEART RATE: 83 BPM | BODY MASS INDEX: 24.75 KG/M2

## 2025-05-13 DIAGNOSIS — R30.0 DYSURIA: Primary | ICD-10-CM

## 2025-05-13 LAB
BILIRUBIN, POC: ABNORMAL
BLOOD URINE, POC: ABNORMAL
CLARITY, POC: CLEAR
COLOR, POC: YELLOW
GLUCOSE URINE, POC: 500 MG/DL
KETONES, POC: 15 MG/DL
LEUKOCYTE EST, POC: ABNORMAL
NITRITE, POC: ABNORMAL
PH, POC: 5.5
PROTEIN, POC: ABNORMAL MG/DL
SPECIFIC GRAVITY, POC: 1
UROBILINOGEN, POC: 0.2 MG/DL

## 2025-05-13 RX ORDER — SULFAMETHOXAZOLE AND TRIMETHOPRIM 800; 160 MG/1; MG/1
1 TABLET ORAL 2 TIMES DAILY
Qty: 14 TABLET | Refills: 0 | Status: SHIPPED | OUTPATIENT
Start: 2025-05-13 | End: 2025-05-20

## 2025-05-13 NOTE — PROGRESS NOTES
Chief Complaint       Fever (X3 days with fever and pain/pressure with urination/) and Dysuria      History of Present Illness   Source of history provided by:  patient.      Radha Geiger is a 65 y.o. old female presenting to the walk in clinic for evaluation of dysuria x 3 days. Reports associated frequency, urgency, and suprapubic pressure. Denies gross hematuria.  Denies associated flank pain. Denies any fever, chills, vaginal discharge, vaginal bleeding, possibility of pregnancy, vomiting, diarrhea, or lethargy.  No LMP recorded. Patient is postmenopausal.      ROS    Unless otherwise stated in this report or unable to obtain because of the patient's clinical or mental status as evidenced by the medical record, this patients's positive and negative responses for Review of Systems, constitutional, psych, eyes, ENT, cardiovascular, respiratory, gastrointestinal, neurological, genitourinary, musculoskeletal, integument systems and systems related to the presenting problem are either stated in the preceding or were not pertinent or were negative for the symptoms and/or complaints related to the medical problem.    Past Medical History:  has a past medical history of 2019 novel coronavirus disease (COVID-19), Coronary artery disease involving native coronary artery of native heart without angina pectoris, Diabetes mellitus (HCC), Hyperlipidemia, Hypertension, and NSTEMI (non-ST elevated myocardial infarction) ([facility]).  Past Surgical History:  has a past surgical history that includes Tonsillectomy;  section; Coronary angioplasty with stent (04/10/2017); and Coronary artery bypass graft (N/A, 2022).  Social History:  reports that she has never smoked. She has never used smokeless tobacco. She reports that she does not drink alcohol and does not use drugs.  Family History: family history includes Dementia in her mother; Diabetes in her maternal grandfather; High Blood Pressure in her father.

## 2025-05-14 LAB
CULTURE: NORMAL
CULTURE: NORMAL
SPECIMEN DESCRIPTION: NORMAL

## 2025-05-15 ENCOUNTER — RESULTS FOLLOW-UP (OUTPATIENT)
Dept: FAMILY MEDICINE CLINIC | Age: 65
End: 2025-05-15

## 2025-07-24 DIAGNOSIS — Z95.1 S/P CABG (CORONARY ARTERY BYPASS GRAFT): ICD-10-CM

## 2025-07-24 DIAGNOSIS — I25.10 CORONARY ARTERY DISEASE INVOLVING NATIVE CORONARY ARTERY OF NATIVE HEART WITHOUT ANGINA PECTORIS: ICD-10-CM

## 2025-07-25 RX ORDER — CLOPIDOGREL BISULFATE 75 MG/1
75 TABLET ORAL DAILY
Qty: 90 TABLET | Refills: 0 | Status: SHIPPED | OUTPATIENT
Start: 2025-07-25

## (undated) DEVICE — CATHETER,URETHRAL,REDRUBBER,STRL,18FR: Brand: MEDLINE

## (undated) DEVICE — GLOVE ORANGE PI 7   MSG9070

## (undated) DEVICE — GOWN,SIRUS,FABRNF,XL,20/CS: Brand: MEDLINE

## (undated) DEVICE — GLOVE SURG SZ 65 THK91MIL LTX FREE SYN POLYISOPRENE

## (undated) DEVICE — GLOVE ORANGE PI 7 1/2   MSG9075

## (undated) DEVICE — BASIC SINGLE BASIN 1-LF: Brand: MEDLINE INDUSTRIES, INC.

## (undated) DEVICE — SET SURG BASIN OPEN HEART NO  1 REUSABLE

## (undated) DEVICE — GOWN,SIRUS,FABRNF,L,20/CS: Brand: MEDLINE

## (undated) DEVICE — GLOVE SURG SZ 6.5 L11.2IN FNGR THK9.8MIL STRW LTX POLYMER

## (undated) DEVICE — 3M™ TEGADERM™ CHG DRESSING 25/CARTON 4 CARTONS/CASE 1657: Brand: TEGADERM™

## (undated) DEVICE — CATHETER URETH ALL PURP 14 FR RND HLLW TIP INTERMITTENT LTX

## (undated) DEVICE — BLADE OPHTH 180DEG CUT SURF BLU STR SHRP DBL BVL GRINDLESS

## (undated) DEVICE — Device: Brand: CLEANCUT ROTATING AORTIC PUNCH

## (undated) DEVICE — GLOVE SURG SZ 6 THK91MIL LTX FREE SYN POLYISOPRENE ANTI

## (undated) DEVICE — DRAIN SURG L3/8-1/2IN DIA3/16IN SIL CARD CONN 1:1 BLAK

## (undated) DEVICE — LABEL MED CARD SURG 4 IN PANEL STRL

## (undated) DEVICE — ADHESIVE SKIN CLOSURE TOP 36 CC HI VISC DERMBND MINI

## (undated) DEVICE — 6 FOOT DISPOSABLE EXTENSION CABLE WITH SAFE CONNECT / SCREW-DOWN

## (undated) DEVICE — SOLUTION IV 50ML 0.9% SOD CHL PLAS CONT USP VIAFLX

## (undated) DEVICE — CANNULA INJ L2.5IN BLNT TIP 3MM CLR BODY W/ 1 W VLV DLP

## (undated) DEVICE — Device

## (undated) DEVICE — TTL1LYR 16FR10ML 100%SIL TMPST TR: Brand: MEDLINE

## (undated) DEVICE — LANCET AORTOTOMY 3.3X10MM

## (undated) DEVICE — PICO 7 10CM X 30CM: Brand: PICO™ 7

## (undated) DEVICE — SOLUTION IV 1000ML 140MEQ/L SOD 5MEQ/L K 3MEQ/L MG 27MEQ/L

## (undated) DEVICE — DRAIN SURG SGL COLL PT TB FOR ATS BG OASIS

## (undated) DEVICE — TUBING, SUCTION, 3/16" X 12', STRAIGHT: Brand: MEDLINE

## (undated) DEVICE — DOUBLE BASIN SET: Brand: MEDLINE INDUSTRIES, INC.

## (undated) DEVICE — TOWEL,OR,DSP,ST,WHITE,DLX,4/PK,20PK/CS: Brand: MEDLINE

## (undated) DEVICE — BLOWER COR ART L16.5CM PLAS SHFT MAL W/ MIST IV SET AXIUS

## (undated) DEVICE — SYRINGE MED 20ML STD CLR PLAS LUERSLIP TIP N CTRL DISP

## (undated) DEVICE — E-Z CLEAN, NON-STICK, PTFE COATED, ELECTROSURGICAL BLADE ELECTRODE, MODIFIED EXTENDED INSULATION, 6.5 INCH (16.5 CM): Brand: MEGADYNE

## (undated) DEVICE — CONNECTOR DRNGE W3/8-0.5XH3/16XL3/16IN 2:1 SIL CARD STR

## (undated) DEVICE — ALCOHOL RUBBING ISO 16OZ 70%

## (undated) DEVICE — PACK OPEN HRT DRP

## (undated) DEVICE — OPEN HEART: Brand: MEDLINE INDUSTRIES, INC.

## (undated) DEVICE — TOWEL,OR,DSP,ST,BLUE,STD,6/PK,12PK/CS: Brand: MEDLINE